# Patient Record
Sex: MALE | Race: WHITE | NOT HISPANIC OR LATINO | URBAN - METROPOLITAN AREA
[De-identification: names, ages, dates, MRNs, and addresses within clinical notes are randomized per-mention and may not be internally consistent; named-entity substitution may affect disease eponyms.]

---

## 2022-10-23 ENCOUNTER — EMERGENCY (EMERGENCY)
Facility: HOSPITAL | Age: 47
LOS: 0 days | Discharge: PSYCHIATRIC OR SUBSTANCE USE FACILITY | End: 2022-10-24
Attending: EMERGENCY MEDICINE

## 2022-10-23 VITALS
OXYGEN SATURATION: 100 % | DIASTOLIC BLOOD PRESSURE: 83 MMHG | SYSTOLIC BLOOD PRESSURE: 127 MMHG | HEIGHT: 74 IN | WEIGHT: 250 LBS | HEART RATE: 97 BPM | RESPIRATION RATE: 17 BRPM | TEMPERATURE: 98 F

## 2022-10-23 DIAGNOSIS — I48.91 UNSPECIFIED ATRIAL FIBRILLATION: ICD-10-CM

## 2022-10-23 DIAGNOSIS — Z20.822 CONTACT WITH AND (SUSPECTED) EXPOSURE TO COVID-19: ICD-10-CM

## 2022-10-23 DIAGNOSIS — F32.A DEPRESSION, UNSPECIFIED: ICD-10-CM

## 2022-10-23 DIAGNOSIS — R45.851 SUICIDAL IDEATIONS: ICD-10-CM

## 2022-10-23 LAB
BASOPHILS # BLD AUTO: 0.06 K/UL — SIGNIFICANT CHANGE UP (ref 0–0.2)
BASOPHILS NFR BLD AUTO: 0.7 % — SIGNIFICANT CHANGE UP (ref 0–2)
EOSINOPHIL # BLD AUTO: 0.16 K/UL — SIGNIFICANT CHANGE UP (ref 0–0.5)
EOSINOPHIL NFR BLD AUTO: 1.9 % — SIGNIFICANT CHANGE UP (ref 0–6)
HCT VFR BLD CALC: 47.2 % — SIGNIFICANT CHANGE UP (ref 39–50)
HGB BLD-MCNC: 15.9 G/DL — SIGNIFICANT CHANGE UP (ref 13–17)
IMM GRANULOCYTES NFR BLD AUTO: 0.2 % — SIGNIFICANT CHANGE UP (ref 0–0.9)
LYMPHOCYTES # BLD AUTO: 2.11 K/UL — SIGNIFICANT CHANGE UP (ref 1–3.3)
LYMPHOCYTES # BLD AUTO: 25.1 % — SIGNIFICANT CHANGE UP (ref 13–44)
MCHC RBC-ENTMCNC: 31 PG — SIGNIFICANT CHANGE UP (ref 27–34)
MCHC RBC-ENTMCNC: 33.7 G/DL — SIGNIFICANT CHANGE UP (ref 32–36)
MCV RBC AUTO: 92 FL — SIGNIFICANT CHANGE UP (ref 80–100)
MONOCYTES # BLD AUTO: 0.48 K/UL — SIGNIFICANT CHANGE UP (ref 0–0.9)
MONOCYTES NFR BLD AUTO: 5.7 % — SIGNIFICANT CHANGE UP (ref 2–14)
NEUTROPHILS # BLD AUTO: 5.58 K/UL — SIGNIFICANT CHANGE UP (ref 1.8–7.4)
NEUTROPHILS NFR BLD AUTO: 66.4 % — SIGNIFICANT CHANGE UP (ref 43–77)
NRBC # BLD: 0 /100 WBCS — SIGNIFICANT CHANGE UP (ref 0–0)
PCP SPEC-MCNC: SIGNIFICANT CHANGE UP
PLATELET # BLD AUTO: 201 K/UL — SIGNIFICANT CHANGE UP (ref 150–400)
RBC # BLD: 5.13 M/UL — SIGNIFICANT CHANGE UP (ref 4.2–5.8)
RBC # FLD: 12.3 % — SIGNIFICANT CHANGE UP (ref 10.3–14.5)
SALICYLATES SERPL-MCNC: 4.2 MG/DL — SIGNIFICANT CHANGE UP (ref 2.8–20)
WBC # BLD: 8.41 K/UL — SIGNIFICANT CHANGE UP (ref 3.8–10.5)
WBC # FLD AUTO: 8.41 K/UL — SIGNIFICANT CHANGE UP (ref 3.8–10.5)

## 2022-10-23 PROCEDURE — 99285 EMERGENCY DEPT VISIT HI MDM: CPT

## 2022-10-23 PROCEDURE — 93010 ELECTROCARDIOGRAM REPORT: CPT

## 2022-10-23 NOTE — ED PROVIDER NOTE - OBJECTIVE STATEMENT
Pertinent PMH/PSH/FHx/SHx and Review of Systems contained within:  Patient presents to the ED for suicidal ideation.  Patient brought by his loyda Jose Douglasimelda  for suicidal thoughts for the last week.  Patient says that he's been struggling with depression since age of 17.  He would plan to cut his wrists if he went through with it.  He feels his depression is related to his sobriety from cocaine and alcohol, now 50 days.  He denies any medical complaints.  He has been taking Prozac to manage his depression but feels it is not working.     Relevant PMHx/SHx/SOCHx/FAMH:  Bipolar disorder, Atrial fib  Patient denies EtOH/tobacco/illicit substance use in recent months    ROS: No fever/chills, No headache/photophobia/eye pain/changes in vision, No ear pain/sore throat/dysphagia, No chest pain/palpitations, no SOB/cough/wheeze/stridor, No abdominal pain, No N/V/D/melena, no dysuria/frequency/discharge, No neck/back pain, no rash, no changes in neurological status/function.

## 2022-10-23 NOTE — ED ADULT TRIAGE NOTE - CHIEF COMPLAINT QUOTE
suicidal thoughts , has a plan cut his wrist , multiples attempts last attempt was 2013, hx bipolar taking Prozac and has being sober 50 days today from cocaine and alcohol

## 2022-10-23 NOTE — ED ADULT NURSE NOTE - OBJECTIVE STATEMENT
pt is AOx3, ambulatory. pt came in complaining of suicidal thoughts. pt states hes been having suicidal thoughts since last week that have progressively gotten worse. pt states he isnt doing well mentally at this time. pt states he has a hx of bipolar and depression that he takes medication for. pt states he was previously admitted for the same thing. pt states the last time he had this episode was in 2013. pt states he did harm his L wrist today.    pt states he is 50 days sober from cocaine and alcohol. pt is AOx3, ambulatory. pt came in complaining of suicidal thoughts. pt states hes been having suicidal thoughts since last week that have progressively gotten worse. pt states he isnt doing well mentally at this time. pt states he has a hx of bipolar and depression that he takes medication for. pt states he was previously admitted for the same thing back in 2013. pt states the last time he had an episode was in 2013. pt states he did harm his L wrist today. pt is calm and cooperative at this time. pt has no other complaints at this time.     pt states he is 50 days sober from cocaine and alcohol. pt is AOx3, ambulatory. pt came in complaining of suicidal thoughts. pt states hes been having suicidal thoughts since last week that have progressively gotten worse. pt states he isnt doing well mentally at this time. pt states he has a hx of bipolar and depression that he takes medication for. pt states he was previously admitted for the same thing back in 2013. pt states the last time he had an episode was in 2013. pt states he did harm his L wrist today. pt denies any hallucinations. pt is calm and cooperative at this time. pt has no other complaints at this time.     pt states he is 50 days sober from cocaine and alcohol.  pt has pmh of AFIB

## 2022-10-23 NOTE — ED PROVIDER NOTE - CLINICAL SUMMARY MEDICAL DECISION MAKING FREE TEXT BOX
Patient stating depression and suicidal ideation.  VSS.  Lab values reviewed, there are no values which require acute intervention, patient medically cleared for admission.  Evaluated by psych, patient to be admitted on voluntary basis and consents to transfer.

## 2022-10-23 NOTE — ED PROVIDER NOTE - PHYSICAL EXAMINATION
Gen: Alert, NAD, sad appearing  Head: NC, AT, EOMI, normal lids/conjunctiva  ENT: normal hearing, patent oropharynx without erythema/exudate, uvula midline  Neck: +supple, no tenderness, +Trachea midline  Pulm: Bilateral BS, normal resp effort, no wheeze/stridor/retractions  CV: RRR, no M/R/G, +dist pulses  Abd: soft, NT/ND, Negative Hastings signs, +BS, no palpable masses  Mskel: no edema/erythema/cyanosis  Skin: no rash, warm/dry  Neuro: AAOx3, no apparent sensory/motor deficits, coordination intact

## 2022-10-24 ENCOUNTER — INPATIENT (INPATIENT)
Facility: HOSPITAL | Age: 47
LOS: 9 days | Discharge: HOME | DRG: 885 | End: 2022-11-03
Attending: PSYCHIATRY & NEUROLOGY | Admitting: PSYCHIATRY & NEUROLOGY
Payer: COMMERCIAL

## 2022-10-24 VITALS
TEMPERATURE: 98 F | DIASTOLIC BLOOD PRESSURE: 88 MMHG | RESPIRATION RATE: 16 BRPM | SYSTOLIC BLOOD PRESSURE: 134 MMHG | OXYGEN SATURATION: 97 % | HEART RATE: 69 BPM

## 2022-10-24 VITALS
OXYGEN SATURATION: 97 % | DIASTOLIC BLOOD PRESSURE: 94 MMHG | TEMPERATURE: 98 F | RESPIRATION RATE: 18 BRPM | SYSTOLIC BLOOD PRESSURE: 162 MMHG | HEART RATE: 61 BPM

## 2022-10-24 DIAGNOSIS — F32.A DEPRESSION, UNSPECIFIED: ICD-10-CM

## 2022-10-24 LAB
A1C WITH ESTIMATED AVERAGE GLUCOSE RESULT: 5.7 % — HIGH (ref 4–5.6)
ALBUMIN SERPL ELPH-MCNC: 4.2 G/DL — SIGNIFICANT CHANGE UP (ref 3.3–5)
ALP SERPL-CCNC: 83 U/L — SIGNIFICANT CHANGE UP (ref 40–120)
ALT FLD-CCNC: 35 U/L — SIGNIFICANT CHANGE UP (ref 12–78)
AMPHET UR-MCNC: NEGATIVE — SIGNIFICANT CHANGE UP
ANION GAP SERPL CALC-SCNC: 5 MMOL/L — SIGNIFICANT CHANGE UP (ref 5–17)
APAP SERPL-MCNC: < 2 UG/ML (ref 10–30)
AST SERPL-CCNC: 15 U/L — SIGNIFICANT CHANGE UP (ref 15–37)
BARBITURATES UR SCN-MCNC: NEGATIVE — SIGNIFICANT CHANGE UP
BENZODIAZ UR-MCNC: NEGATIVE — SIGNIFICANT CHANGE UP
BILIRUB SERPL-MCNC: 0.3 MG/DL — SIGNIFICANT CHANGE UP (ref 0.2–1.2)
BUN SERPL-MCNC: 12 MG/DL — SIGNIFICANT CHANGE UP (ref 7–23)
CALCIUM SERPL-MCNC: 9.6 MG/DL — SIGNIFICANT CHANGE UP (ref 8.5–10.1)
CHLORIDE SERPL-SCNC: 109 MMOL/L — HIGH (ref 96–108)
CHOLEST SERPL-MCNC: 152 MG/DL — SIGNIFICANT CHANGE UP
CO2 SERPL-SCNC: 24 MMOL/L — SIGNIFICANT CHANGE UP (ref 22–31)
COCAINE METAB.OTHER UR-MCNC: NEGATIVE — SIGNIFICANT CHANGE UP
CREAT SERPL-MCNC: 1.1 MG/DL — SIGNIFICANT CHANGE UP (ref 0.5–1.3)
EGFR: 85 ML/MIN/1.73M2 — SIGNIFICANT CHANGE UP
ESTIMATED AVERAGE GLUCOSE: 117 MG/DL — HIGH (ref 68–114)
ETHANOL SERPL-MCNC: <10 MG/DL — SIGNIFICANT CHANGE UP (ref 0–10)
FLUAV AG NPH QL: SIGNIFICANT CHANGE UP
FLUBV AG NPH QL: SIGNIFICANT CHANGE UP
GLUCOSE SERPL-MCNC: 90 MG/DL — SIGNIFICANT CHANGE UP (ref 70–99)
HDLC SERPL-MCNC: 35 MG/DL — LOW
LIPID PNL WITH DIRECT LDL SERPL: 93 MG/DL — SIGNIFICANT CHANGE UP
METHADONE UR-MCNC: NEGATIVE — SIGNIFICANT CHANGE UP
NON HDL CHOLESTEROL: 117 MG/DL — SIGNIFICANT CHANGE UP
OPIATES UR-MCNC: NEGATIVE — SIGNIFICANT CHANGE UP
PCP UR-MCNC: NEGATIVE — SIGNIFICANT CHANGE UP
POTASSIUM SERPL-MCNC: 4.1 MMOL/L — SIGNIFICANT CHANGE UP (ref 3.5–5.3)
POTASSIUM SERPL-SCNC: 4.1 MMOL/L — SIGNIFICANT CHANGE UP (ref 3.5–5.3)
PROT SERPL-MCNC: 7.7 GM/DL — SIGNIFICANT CHANGE UP (ref 6–8.3)
SARS-COV-2 RNA SPEC QL NAA+PROBE: SIGNIFICANT CHANGE UP
SODIUM SERPL-SCNC: 138 MMOL/L — SIGNIFICANT CHANGE UP (ref 135–145)
THC UR QL: NEGATIVE — SIGNIFICANT CHANGE UP
TRIGL SERPL-MCNC: 121 MG/DL — SIGNIFICANT CHANGE UP

## 2022-10-24 PROCEDURE — 90792 PSYCH DIAG EVAL W/MED SRVCS: CPT | Mod: 95

## 2022-10-24 RX ORDER — INFLUENZA VIRUS VACCINE 15; 15; 15; 15 UG/.5ML; UG/.5ML; UG/.5ML; UG/.5ML
0.5 SUSPENSION INTRAMUSCULAR ONCE
Refills: 0 | Status: DISCONTINUED | OUTPATIENT
Start: 2022-10-24 | End: 2022-11-03

## 2022-10-24 RX ORDER — DIPHENHYDRAMINE HCL 50 MG
50 CAPSULE ORAL EVERY 6 HOURS
Refills: 0 | Status: DISCONTINUED | OUTPATIENT
Start: 2022-10-24 | End: 2022-11-03

## 2022-10-24 RX ORDER — GABAPENTIN 400 MG/1
300 CAPSULE ORAL THREE TIMES A DAY
Refills: 0 | Status: DISCONTINUED | OUTPATIENT
Start: 2022-10-24 | End: 2022-11-03

## 2022-10-24 RX ORDER — HALOPERIDOL DECANOATE 100 MG/ML
5 INJECTION INTRAMUSCULAR EVERY 6 HOURS
Refills: 0 | Status: DISCONTINUED | OUTPATIENT
Start: 2022-10-24 | End: 2022-11-03

## 2022-10-24 RX ORDER — TRAZODONE HCL 50 MG
50 TABLET ORAL AT BEDTIME
Refills: 0 | Status: DISCONTINUED | OUTPATIENT
Start: 2022-10-24 | End: 2022-11-03

## 2022-10-24 RX ORDER — ACETAMINOPHEN 500 MG
650 TABLET ORAL EVERY 6 HOURS
Refills: 0 | Status: DISCONTINUED | OUTPATIENT
Start: 2022-10-24 | End: 2022-11-03

## 2022-10-24 RX ORDER — LITHIUM CARBONATE 300 MG/1
300 TABLET, EXTENDED RELEASE ORAL AT BEDTIME
Refills: 0 | Status: ACTIVE | OUTPATIENT
Start: 2022-10-24 | End: 2023-09-22

## 2022-10-24 RX ORDER — PANTOPRAZOLE SODIUM 20 MG/1
40 TABLET, DELAYED RELEASE ORAL
Refills: 0 | Status: DISCONTINUED | OUTPATIENT
Start: 2022-10-24 | End: 2022-11-03

## 2022-10-24 RX ADMIN — Medication 50 MILLIGRAM(S): at 22:01

## 2022-10-24 RX ADMIN — GABAPENTIN 300 MILLIGRAM(S): 400 CAPSULE ORAL at 22:01

## 2022-10-24 RX ADMIN — LITHIUM CARBONATE 300 MILLIGRAM(S): 300 TABLET, EXTENDED RELEASE ORAL at 22:01

## 2022-10-24 RX ADMIN — Medication 2 MILLIGRAM(S): at 18:19

## 2022-10-24 NOTE — ED BEHAVIORAL HEALTH ASSESSMENT NOTE - SUMMARY
47 yo M, domiciled in sober house,  from wife, has 3 children (ages 15, 14, 3), works as a , medical history of atrial fibrillation, psychiatric history of bipolar disorder, history of psychiatric hospitalizations, most recent in 2013 following suicide attempt by cutting his wrists, just started seeing a provider at [facility], substance use history of alcohol and cocaine, no legal history, no trauma history, who presents BIB self accompanied by a friend for SI with plan to slit his wrists. Psychiatry consulted for safety assessment.    The patient presents with depressed mood, hopelessness, and intrusive suicidal thoughts with plan and intent to slit his wrists since last week. He made an attempt to cut his wrists with a sharpened pen cap tonight and was interrupted by his friend and peer who walked into the room stopping the patient. Protective factors include patient being engaged in work, connected to outpatient treatment, and responsibility to family members. There is a history of a prior suicide attempt by cutting his wrists in 2013 and there has been a long period of relative mental health stability though extensive substance use prior to today's presentation, is newly connected to outpatient mental health treatment, and outpatient provider increased the Prozac which coincides with start of patient's episode of suicidal thoughts all of which acutely elevates patient's risk of suicide. Additional stressors are adjusting to sober living and being  from his family. The patient is unable to safety plan, unable to identify reasons to live or identify future plans. The patient meets involuntary criteria at this time, requiring psychiatric hospitalization for safety, stabilization, and medication management. The patient is willing to sign in and recommend reassessment if patient requests to leave.    PLAN:    - Hold for bed, 9.13  - Call psychiatry to reassess if patient requests discharge  - Discontinue Prozac

## 2022-10-24 NOTE — BH INPATIENT PSYCHIATRY ASSESSMENT NOTE - NSBHPHYSICALEXAM_PSY_ALL_CORE
PHYSICAL EXAM: Agree    VITALS: T(C): 36.6 (10-24-22 @ 11:57), Max: 36.7 (10-23-22 @ 22:00)  HR: 69 (10-24-22 @ 11:57) (63 - 97)  BP: 134/88 (10-24-22 @ 11:57) (127/76 - 139/90)  RR: 16 (10-24-22 @ 11:57) (14 - 18)  SpO2: 97% (10-24-22 @ 11:57) (96% - 100%)      GENERAL: NAD, comfortable, ambulating  HEAD:  Atraumatic, Normocephalic  EYES: EOMI, PERRLA, conjunctiva and sclera clear  ENT: Moist mucous membranes  NECK: Supple, No JVD  CHEST/LUNG: Clear to auscultation bilaterally; No rales, rhonchi, wheezing, or rubs. Unlabored respirations  HEART: Regular rate and rhythm; No murmurs, rubs, or gallops  ABDOMEN: BSx4; Soft, nontender, nondistended  EXTREMITIES:  No clubbing, cyanosis, or edema  NERVOUS SYSTEM:  A&Ox3, no focal deficits   SKIN: No rashes or lesions

## 2022-10-24 NOTE — BH INPATIENT PSYCHIATRY ASSESSMENT NOTE - NSBHMETABOLIC_PSY_ALL_CORE_FT
BMI: BMI (kg/m2): 32.1 (10-23-22 @ 22:00)  HbA1c:   Glucose:   BP: --  Lipid Panel:  BMI: BMI (kg/m2): 32.1 (10-23-22 @ 22:00)  HbA1c: A1C with Estimated Average Glucose Result: 5.7 % (10-24-22 @ 19:00)    Glucose:   BP: 144/92 (10-25-22 @ 08:25) (126/86 - 162/94)  Lipid Panel: Date/Time: 10-24-22 @ 19:00  Cholesterol, Serum: 152  Direct LDL: --  HDL Cholesterol, Serum: 35  Total Cholesterol/HDL Ration Measurement: --  Triglycerides, Serum: 121

## 2022-10-24 NOTE — BH INPATIENT PSYCHIATRY ASSESSMENT NOTE - NSICDXBHSECONDARYDX_PSY_ALL_CORE
Severe alcohol use disorder, in early remission   F10.21  Severe cocaine use disorder, in early remission   F14.21  MDD (major depressive disorder)   F32.9

## 2022-10-24 NOTE — BH INPATIENT PSYCHIATRY ASSESSMENT NOTE - NSBHCRANIAL_PSY_ALL_CORE
Recognizes 2 fingers or can read (II)/Opens mouth, sticks out tongue (V, XII)/Normal speech (IX, X, XII)/Shoulder shrug (XI)

## 2022-10-24 NOTE — BH INPATIENT PSYCHIATRY ASSESSMENT NOTE - NSBHASSESSSUMMFT_PSY_ALL_CORE
This is a 47 yo  male, domiciled in sober house x50 days,  with 3 children (ages 15, 14, 3, 4y/o lives at home with current wife, older two live with his ex wife), works as a  at [employer] in [employer] de[employer]. Medical history significant for atrial fibrillation, HTN and GERD. Psychiatric history of bipolar disorder, history of psychiatric hospitalizations, most recent in 2013 following suicide attempt by cutting his wrists. Hx of numerous detox/ rehabs (alcohol/cocaine), currently in sober house x50 days. In treatment recently with Psychiatrist and therapist at [facility] being treated with prozac. No legal history, no trauma history. Patient presents to ED accompanied by a friend with c/o of SI with plan to slit his wrists. Transferred to [facility] voluntary status.

## 2022-10-24 NOTE — BH INPATIENT PSYCHIATRY ASSESSMENT NOTE - CURRENT MEDICATION
MEDICATIONS  (STANDING):  gabapentin 300 milliGRAM(s) Oral three times a day  influenza   Vaccine 0.5 milliLiter(s) IntraMuscular once  lithium 300 milliGRAM(s) Oral at bedtime  LORazepam     Tablet 2 milliGRAM(s) Oral every 6 hours  traZODone 50 milliGRAM(s) Oral at bedtime    MEDICATIONS  (PRN):  acetaminophen     Tablet .. 650 milliGRAM(s) Oral every 6 hours PRN Moderate Pain (4 - 6)  aluminum hydroxide/magnesium hydroxide/simethicone Suspension 30 milliLiter(s) Oral every 6 hours PRN Dyspepsia  diphenhydrAMINE 50 milliGRAM(s) Oral every 6 hours PRN agitation  haloperidol     Tablet 5 milliGRAM(s) Oral every 6 hours PRN agitation   MEDICATIONS  (STANDING):  gabapentin 300 milliGRAM(s) Oral three times a day  influenza   Vaccine 0.5 milliLiter(s) IntraMuscular once  lithium 300 milliGRAM(s) Oral at bedtime  losartan 50 milliGRAM(s) Oral daily  pantoprazole    Tablet 40 milliGRAM(s) Oral before breakfast  traZODone 50 milliGRAM(s) Oral at bedtime    MEDICATIONS  (PRN):  acetaminophen     Tablet .. 650 milliGRAM(s) Oral every 6 hours PRN Moderate Pain (4 - 6)  aluminum hydroxide/magnesium hydroxide/simethicone Suspension 30 milliLiter(s) Oral every 6 hours PRN Dyspepsia  diphenhydrAMINE 50 milliGRAM(s) Oral every 6 hours PRN agitation  haloperidol     Tablet 5 milliGRAM(s) Oral every 6 hours PRN agitation  LORazepam     Tablet 2 milliGRAM(s) Oral every 6 hours PRN agitation

## 2022-10-24 NOTE — BH PATIENT PROFILE - HOME MEDICATIONS
FLUoxetine 60 mg oral tablet , 1 tab(s) orally once a day (in the morning)  omeprazole 40 mg oral delayed release capsule , 1 cap(s) orally once a day  amLODIPine 10 mg oral tablet , 1 tab(s) orally once a day

## 2022-10-24 NOTE — BH INPATIENT PSYCHIATRY ASSESSMENT NOTE - NSBHCHARTREVIEWVS_PSY_A_CORE FT
Vital Signs Last 24 Hrs  T(C): 36.6 (10-24-22 @ 11:57), Max: 36.7 (10-23-22 @ 22:00)  T(F): 97.8 (10-24-22 @ 11:57), Max: 98 (10-23-22 @ 22:00)  HR: 69 (10-24-22 @ 11:57) (63 - 97)  BP: 134/88 (10-24-22 @ 11:57) (127/76 - 139/90)  BP(mean): --  RR: 16 (10-24-22 @ 11:57) (14 - 18)  SpO2: 97% (10-24-22 @ 11:57) (96% - 100%)     Vital Signs Last 24 Hrs  T(C): 36.4 (10-25-22 @ 08:25), Max: 36.4 (10-25-22 @ 08:25)  T(F): 97.5 (10-25-22 @ 08:25), Max: 97.5 (10-25-22 @ 08:25)  HR: 67 (10-25-22 @ 08:25) (67 - 69)  BP: 144/92 (10-25-22 @ 08:25) (126/86 - 150/90)  BP(mean): --  RR: 18 (10-25-22 @ 08:25) (18 - 18)  SpO2: 97% (10-25-22 @ 08:25) (97% - 97%)

## 2022-10-24 NOTE — BH PATIENT PROFILE - FALL HARM RISK - UNIVERSAL INTERVENTIONS
Bed in lowest position, wheels locked, appropriate side rails in place/Call bell, personal items and telephone in reach/Instruct patient to call for assistance before getting out of bed or chair/Non-slip footwear when patient is out of bed/Merrill to call system/Physically safe environment - no spills, clutter or unnecessary equipment/Purposeful Proactive Rounding/Room/bathroom lighting operational, light cord in reach

## 2022-10-24 NOTE — BH INPATIENT PSYCHIATRY ASSESSMENT NOTE - NSBHATTESTAPPBILLTIME_PSY_A_CORE
I attest my time as TARIQ is greater than 50% of the total combined time spent on qualifying patient care activities. I have reviewed and verified the documentation.

## 2022-10-24 NOTE — ED BEHAVIORAL HEALTH ASSESSMENT NOTE - HPI (INCLUDE ILLNESS QUALITY, SEVERITY, DURATION, TIMING, CONTEXT, MODIFYING FACTORS, ASSOCIATED SIGNS AND SYMPTOMS)
47 yo M, domiciled in sober house,  from wife, has 3 children (ages 15, 14, 3), works as a , medical history of atrial fibrillation, psychiatric history of bipolar disorder, history of psychiatric hospitalizations, most recent in 2013 following suicide attempt by cutting his wrists, just started seeing a provider at [facility], substance use history of alcohol and cocaine, no legal history, no trauma history, who presents BIB self accompanied by a friend for SI with plan to slit his wrists. Psychiatry consulted for safety assessment.    Patient reports that he was cutting his wrist with a sharpened pen cap when his friend walked in the room and he stopped. He says he has been having suicidal thoughts since last week and he lied to his psychiatrist when they met last week. He says his psychiatrist increased the Prozac from 40 mg to 60 mg and it was the second visit with this provider. He reports using alcohol, cocaine, self-interrupted suicide attempts, and then cutting his wrists in 2013 leading to his last psychiatric hospitalization. He reports being stable from 2013 to 2016 and doesn't remember what medication he was on at that time, just that he was diagnosed with bipolar disorder. He reports in 2016 he stopped taking his psychiatric medication and relapsed using alcohol and cocaine until 50 days ago when he entered sober living and started getting mental health treatment again. He says he is struggling with the transition of being sober,  from his wife again, and says he doesn't know what his thoughts are regarding suicide. He denies homicidal thoughts, denies access to a gun, denies history of violence or legal issues. He says he is willing to be hospitalized.

## 2022-10-24 NOTE — BH SOCIAL WORK INITIAL PSYCHOSOCIAL EVALUATION - NSBHCHILDRESP_PSY_ALL_CORE
with 3 children (ages 15, 14, 3) 2y/o lives at home with current wife, older two live with his ex wife)/No

## 2022-10-24 NOTE — BH INPATIENT PSYCHIATRY ASSESSMENT NOTE - HPI (INCLUDE ILLNESS QUALITY, SEVERITY, DURATION, TIMING, CONTEXT, MODIFYING FACTORS, ASSOCIATED SIGNS AND SYMPTOMS)
This is a 47 yo  male, domiciled in sober house x50 days,  with 3 children (ages 15, 14, 3, 4y/o lives at home with current wife, older two live with his ex wife), works as a  at [employer] in [employer] dept. Medical history significant for atrial fibrillation, HTN and GERD. Psychiatric history of bipolar disorder, history of psychiatric hospitalizations, most recent in 2013 following suicide attempt by cutting his wrists. Hx of numerous detox/ rehabs (alcohol/cocaine), currently in sober house x50 days. In treatment recently with Psychiatrist and therapist at [facility] being treated with prozac. No legal history, no trauma history. Patient presents to [facility] accompanied by a friend with c/o of SI with plan to slit his wrists. Transferred to [facility] voluntary status.     Patient states that he has been free of substances and alcohol x51 days, following detox at [facility] and transition to sober house. Substances of choice are etoh and cocaine. He has been using substances since 8/9th grade. He reports having diagnoses of Bipolar but stopped taking his medications years ago. He was recently started on prozac x1 month with minimal effect on mood. SI has been worsening x2 weeks with him recently attempting to cut his wrist with a sharpened pen cap. A friend was very concerned about him and pt disclosed how he was feeling. Friend encouraged him to go to hospital for help. Poor sleep and appetite since going to the sober house, losing an estimated 15lbs and averaging 3 hours each night. No a/vh but has had in the past while intoxicated. No paranoia. No hi.       per ED report:   Patient reports that he was cutting his wrist with a sharpened pen cap when his friend walked in the room and he stopped. He says he has been having suicidal thoughts since last week and he lied to his psychiatrist when they met last week. He says his psychiatrist increased the Prozac from 40 mg to 60 mg and it was the second visit with this provider. He reports using alcohol, cocaine, self-interrupted suicide attempts, and then cutting his wrists in 2013 leading to his last psychiatric hospitalization. He reports being stable from 2013 to 2016 and doesn't remember what medication he was on at that time, just that he was diagnosed with bipolar disorder. He reports in 2016 he stopped taking his psychiatric medication and relapsed using alcohol and cocaine until 50 days ago when he entered sober living and started getting mental health treatment again. He says he is struggling with the transition of being sober,  from his wife again, and says he doesn't know what his thoughts are regarding suicide. He denies homicidal thoughts, denies access to a gun, denies history of violence or legal issues. He says he is willing to be hospitalized.

## 2022-10-24 NOTE — BH SOCIAL WORK INITIAL PSYCHOSOCIAL EVALUATION - NSHIGHRISKBEHFT_PSY_ALL_CORE
SI has been worsening x2 weeks with him recently attempting to cut his wrist with a sharpened pen cap.

## 2022-10-24 NOTE — ED BEHAVIORAL HEALTH NOTE - BEHAVIORAL HEALTH NOTE
==================  PRE-HOSPITAL COURSE  ===================  SOURCE:  RN and secondhand ED documentation  DETAILS: Patient is a 46y Male complaining of suicidal thoughts.  =========  ED COURSE  =========  SOURCE:  KERRI Feliciano and secondhand ED documentation.  ARRIVAL:  Per chart and RN, patient arrived via walk in. Per RN, patient was calm upon arrival, and cooperative with triage process.  BELONGINGS:  Per RN, patient arrived with belongings. All belongings were provided to hospital security, and patient currently in a gown with a 1:1 staff member.  BEHAVIOR: RN described patient to be calm and cooperative, presenting with linear thought process, AAOx3, presenting with normal mood and appropriate affect, remains in behavioral and impulse control, is not violent/aggressive. RN stated that the patient is endorsing SI, denying HI/A/VH. RN stated that there is a cut on the L wrist, no other visible marks, bruises, or lacerations on the body. RN stated that the patient appears to be well-groomed, maintains fair hygiene, and reports fair ADLs, ambulates without assistance.   TREATMENT:  Per chart and RN, patient did not receive PRN medications.   VISITORS:  Per RN, no visitors at bedside.    COVID Exposure Screen- collateral (i.e. third-party, chart review, belongings, etc; include EMS and ED staff)   ---------------------------------------------------  1. Has the patient had a COVID-19 test in the last 90 days? Unknown.   2. Has the patient tested positive for COVID-19 antibodies? Unknown.   3. Has the patient received 2 doses of the COVID-19 vaccine? Unknown  4. In the past 10 days, has the patient been around anyone with a positive COVID-19 test?* Unknown.   5. Has the patient been out of [place] within the past 10 days? Unknown

## 2022-10-25 DIAGNOSIS — F14.21 COCAINE DEPENDENCE, IN REMISSION: ICD-10-CM

## 2022-10-25 DIAGNOSIS — F32.9 MAJOR DEPRESSIVE DISORDER, SINGLE EPISODE, UNSPECIFIED: ICD-10-CM

## 2022-10-25 DIAGNOSIS — F10.21 ALCOHOL DEPENDENCE, IN REMISSION: ICD-10-CM

## 2022-10-25 PROCEDURE — 99223 1ST HOSP IP/OBS HIGH 75: CPT

## 2022-10-25 RX ORDER — LOSARTAN POTASSIUM 100 MG/1
50 TABLET, FILM COATED ORAL DAILY
Refills: 0 | Status: DISCONTINUED | OUTPATIENT
Start: 2022-10-25 | End: 2022-11-03

## 2022-10-25 RX ADMIN — Medication 2 MILLIGRAM(S): at 16:53

## 2022-10-25 RX ADMIN — GABAPENTIN 300 MILLIGRAM(S): 400 CAPSULE ORAL at 12:55

## 2022-10-25 RX ADMIN — HALOPERIDOL DECANOATE 5 MILLIGRAM(S): 100 INJECTION INTRAMUSCULAR at 16:53

## 2022-10-25 RX ADMIN — LOSARTAN POTASSIUM 50 MILLIGRAM(S): 100 TABLET, FILM COATED ORAL at 12:55

## 2022-10-25 RX ADMIN — GABAPENTIN 300 MILLIGRAM(S): 400 CAPSULE ORAL at 09:33

## 2022-10-25 RX ADMIN — PANTOPRAZOLE SODIUM 40 MILLIGRAM(S): 20 TABLET, DELAYED RELEASE ORAL at 06:48

## 2022-10-25 NOTE — DIETITIAN INITIAL EVALUATION ADULT - PERTINENT LABORATORY DATA
10-23    138  |  109<H>  |  12  ----------------------------<  90  4.1   |  24  |  1.10    Ca    9.6      23 Oct 2022 22:16    TPro  7.7  /  Alb  4.2  /  TBili  0.3  /  DBili  x   /  AST  15  /  ALT  35  /  AlkPhos  83  10-23  A1C with Estimated Average Glucose Result: 5.7 % (10-24-22 @ 19:00)

## 2022-10-25 NOTE — BH INPATIENT PSYCHIATRY PROGRESS NOTE - NSBHCHARTREVIEWVS_PSY_A_CORE FT
Vital Signs Last 24 Hrs  T(C): 36.4 (10-25-22 @ 08:25), Max: 36.4 (10-25-22 @ 08:25)  T(F): 97.5 (10-25-22 @ 08:25), Max: 97.5 (10-25-22 @ 08:25)  HR: 67 (10-25-22 @ 08:25) (67 - 69)  BP: 144/92 (10-25-22 @ 08:25) (126/86 - 150/90)  BP(mean): --  RR: 18 (10-25-22 @ 08:25) (18 - 18)  SpO2: 97% (10-25-22 @ 08:25) (97% - 97%)     Vital Signs Last 24 Hrs  T(C): --  T(F): --  HR: 68 (10-25-22 @ 17:00) (68 - 68)  BP: 143/88 (10-25-22 @ 17:00) (143/88 - 143/88)  BP(mean): --  RR: 18 (10-25-22 @ 17:00) (18 - 18)  SpO2: 98% (10-25-22 @ 17:00) (98% - 98%)

## 2022-10-25 NOTE — DIETITIAN INITIAL EVALUATION ADULT - PERTINENT MEDS FT
MEDICATIONS  (STANDING):  gabapentin 300 milliGRAM(s) Oral three times a day  influenza   Vaccine 0.5 milliLiter(s) IntraMuscular once  lithium 300 milliGRAM(s) Oral at bedtime  losartan 50 milliGRAM(s) Oral daily  pantoprazole    Tablet 40 milliGRAM(s) Oral before breakfast  traZODone 50 milliGRAM(s) Oral at bedtime    MEDICATIONS  (PRN):  acetaminophen     Tablet .. 650 milliGRAM(s) Oral every 6 hours PRN Moderate Pain (4 - 6)  aluminum hydroxide/magnesium hydroxide/simethicone Suspension 30 milliLiter(s) Oral every 6 hours PRN Dyspepsia  diphenhydrAMINE 50 milliGRAM(s) Oral every 6 hours PRN agitation  haloperidol     Tablet 5 milliGRAM(s) Oral every 6 hours PRN agitation  LORazepam     Tablet 2 milliGRAM(s) Oral every 6 hours PRN agitation

## 2022-10-25 NOTE — BH INPATIENT PSYCHIATRY PROGRESS NOTE - NSBHMETABOLIC_PSY_ALL_CORE_FT
BMI: BMI (kg/m2): 32.1 (10-23-22 @ 22:00)  HbA1c: A1C with Estimated Average Glucose Result: 5.7 % (10-24-22 @ 19:00)    Glucose:   BP: 144/92 (10-25-22 @ 08:25) (126/86 - 162/94)  Lipid Panel: Date/Time: 10-24-22 @ 19:00  Cholesterol, Serum: 152  Direct LDL: --  HDL Cholesterol, Serum: 35  Total Cholesterol/HDL Ration Measurement: --  Triglycerides, Serum: 121   BMI: BMI (kg/m2): 32.1 (10-23-22 @ 22:00)  HbA1c: A1C with Estimated Average Glucose Result: 5.6 % (10-26-22 @ 05:30)    Glucose:   BP: 143/88 (10-25-22 @ 17:00) (126/86 - 162/94)  Lipid Panel: Date/Time: 10-26-22 @ 05:30  Cholesterol, Serum: 144  Direct LDL: --  HDL Cholesterol, Serum: 33  Total Cholesterol/HDL Ration Measurement: --  Triglycerides, Serum: 113

## 2022-10-25 NOTE — DIETITIAN INITIAL EVALUATION ADULT - OTHER INFO
This is a 45 yo  male, domiciled in sober house x50 days,  with 3 children (ages 15, 14, 3, 4y/o lives at home with current wife, older two live with his ex wife), works as a  at [employer] in [employer] de[employer]. Medical history significant for atrial fibrillation, HTN and GERD. Psychiatric history of bipolar disorder, history of psychiatric hospitalizations, most recent in 2013 following suicide attempt by cutting his wrists. Hx of numerous detox/ rehabs (alcohol/cocaine), currently in sober house x50 days. In treatment recently with Psychiatrist and therapist at [facility] being treated with prozac. No legal history, no trauma history. Patient presents to ED accompanied by a friend with c/o of SI with plan to slit his wrists. Transferred to [facility] voluntary status.     Pt care discussed in IDT rounds. Rx and labs reviewed. Pt presents with no overt signs of nutritional wasting. No ht or wt value in ERM; spoke with pt who reports a UBW of 253 lbs. Estimated ht of 6'0"; IBW of 80.9kg. Pt reports a wt loss of 15 lbs x ~2 months; reports his wt at admission was 238 lbs which supports this wt change value. Pt states he has been living in a sober home x50 days. States he cannot cook here and his food is often stolen. Pt states he will order take out foods often for 1-2 meals per day and not eat otherwise; choices include mcdonalds, taco bell, and pizza. Discussed use of ONS regimen while inpatient and at home; pt agreeable and verbalized understanding. No c/o NVCD or difficult chew/swallow. NKA to food. RDN will continue to reassess, intervene, and monitor as appropriate.     No pain, GI, or skin assessment documented in flowsheets as of yet.

## 2022-10-25 NOTE — DIETITIAN INITIAL EVALUATION ADULT - ADD RECOMMEND
-Continue current diet   -Add Ensure TID (Provider contacted, no response yet)  -Reinforce diet education as able at f/u   -Encourage good PO intake and ONS compliance   -Monitor chemistry, GI fxn, and skin integrity

## 2022-10-25 NOTE — DIETITIAN INITIAL EVALUATION ADULT - OTHER CALCULATIONS
IBW used to calculate needs due to pt's current body weight exceeding 120% of IBW adjusted for age. IBW calculated based on estimated ht value.

## 2022-10-26 LAB
A1C WITH ESTIMATED AVERAGE GLUCOSE RESULT: 5.6 % — SIGNIFICANT CHANGE UP (ref 4–5.6)
CHOLEST SERPL-MCNC: 144 MG/DL — SIGNIFICANT CHANGE UP
ESTIMATED AVERAGE GLUCOSE: 114 MG/DL — SIGNIFICANT CHANGE UP (ref 68–114)
HDLC SERPL-MCNC: 33 MG/DL — LOW
LIPID PNL WITH DIRECT LDL SERPL: 88 MG/DL — SIGNIFICANT CHANGE UP
NON HDL CHOLESTEROL: 111 MG/DL — SIGNIFICANT CHANGE UP
TRIGL SERPL-MCNC: 113 MG/DL — SIGNIFICANT CHANGE UP

## 2022-10-26 PROCEDURE — 99233 SBSQ HOSP IP/OBS HIGH 50: CPT

## 2022-10-26 PROCEDURE — 70450 CT HEAD/BRAIN W/O DYE: CPT | Mod: 26

## 2022-10-26 RX ADMIN — GABAPENTIN 300 MILLIGRAM(S): 400 CAPSULE ORAL at 21:59

## 2022-10-26 RX ADMIN — PANTOPRAZOLE SODIUM 40 MILLIGRAM(S): 20 TABLET, DELAYED RELEASE ORAL at 07:12

## 2022-10-26 RX ADMIN — LITHIUM CARBONATE 300 MILLIGRAM(S): 300 TABLET, EXTENDED RELEASE ORAL at 21:59

## 2022-10-26 RX ADMIN — GABAPENTIN 300 MILLIGRAM(S): 400 CAPSULE ORAL at 15:00

## 2022-10-26 RX ADMIN — HALOPERIDOL DECANOATE 5 MILLIGRAM(S): 100 INJECTION INTRAMUSCULAR at 09:59

## 2022-10-26 RX ADMIN — LOSARTAN POTASSIUM 50 MILLIGRAM(S): 100 TABLET, FILM COATED ORAL at 10:00

## 2022-10-26 RX ADMIN — Medication 50 MILLIGRAM(S): at 21:59

## 2022-10-26 RX ADMIN — GABAPENTIN 300 MILLIGRAM(S): 400 CAPSULE ORAL at 10:00

## 2022-10-26 NOTE — BH INPATIENT PSYCHIATRY PROGRESS NOTE - NSBHCHARTREVIEWVS_PSY_A_CORE FT
Vital Signs Last 24 Hrs  T(C): 36.7 (10-26-22 @ 17:00), Max: 36.7 (10-26-22 @ 17:00)  T(F): 98 (10-26-22 @ 17:00), Max: 98 (10-26-22 @ 17:00)  HR: 67 (10-26-22 @ 17:00) (62 - 67)  BP: 145/73 (10-26-22 @ 17:00) (145/73 - 150/79)  BP(mean): --  RR: 18 (10-26-22 @ 17:00) (18 - 18)  SpO2: 96% (10-26-22 @ 17:00) (95% - 96%)

## 2022-10-26 NOTE — BH INPATIENT PSYCHIATRY PROGRESS NOTE - NSBHMETABOLIC_PSY_ALL_CORE_FT
BMI: BMI (kg/m2): 32.1 (10-23-22 @ 22:00)  HbA1c: A1C with Estimated Average Glucose Result: 5.6 % (10-26-22 @ 05:30)    Glucose:   BP: 145/73 (10-26-22 @ 17:00) (126/86 - 162/94)  Lipid Panel: Date/Time: 10-26-22 @ 05:30  Cholesterol, Serum: 144  Direct LDL: --  HDL Cholesterol, Serum: 33  Total Cholesterol/HDL Ration Measurement: --  Triglycerides, Serum: 113

## 2022-10-27 PROCEDURE — 99233 SBSQ HOSP IP/OBS HIGH 50: CPT

## 2022-10-27 RX ORDER — HALOPERIDOL DECANOATE 100 MG/ML
5 INJECTION INTRAMUSCULAR AT BEDTIME
Refills: 0 | Status: DISCONTINUED | OUTPATIENT
Start: 2022-10-27 | End: 2022-10-31

## 2022-10-27 RX ORDER — PREGABALIN 225 MG/1
1000 CAPSULE ORAL DAILY
Refills: 0 | Status: DISCONTINUED | OUTPATIENT
Start: 2022-10-27 | End: 2022-11-03

## 2022-10-27 RX ORDER — LITHIUM CARBONATE 300 MG/1
450 TABLET, EXTENDED RELEASE ORAL AT BEDTIME
Refills: 0 | Status: DISCONTINUED | OUTPATIENT
Start: 2022-10-27 | End: 2022-10-28

## 2022-10-27 RX ORDER — FOLIC ACID 0.8 MG
1 TABLET ORAL DAILY
Refills: 0 | Status: DISCONTINUED | OUTPATIENT
Start: 2022-10-27 | End: 2022-11-03

## 2022-10-27 RX ADMIN — Medication 1 MILLIGRAM(S): at 13:15

## 2022-10-27 RX ADMIN — LOSARTAN POTASSIUM 50 MILLIGRAM(S): 100 TABLET, FILM COATED ORAL at 09:30

## 2022-10-27 RX ADMIN — Medication 50 MILLIGRAM(S): at 20:47

## 2022-10-27 RX ADMIN — Medication 1 TABLET(S): at 13:15

## 2022-10-27 RX ADMIN — HALOPERIDOL DECANOATE 5 MILLIGRAM(S): 100 INJECTION INTRAMUSCULAR at 20:47

## 2022-10-27 RX ADMIN — PREGABALIN 1000 MICROGRAM(S): 225 CAPSULE ORAL at 14:24

## 2022-10-27 RX ADMIN — PANTOPRAZOLE SODIUM 40 MILLIGRAM(S): 20 TABLET, DELAYED RELEASE ORAL at 07:04

## 2022-10-27 RX ADMIN — GABAPENTIN 300 MILLIGRAM(S): 400 CAPSULE ORAL at 09:30

## 2022-10-27 RX ADMIN — GABAPENTIN 300 MILLIGRAM(S): 400 CAPSULE ORAL at 20:47

## 2022-10-27 RX ADMIN — GABAPENTIN 300 MILLIGRAM(S): 400 CAPSULE ORAL at 13:15

## 2022-10-27 RX ADMIN — LITHIUM CARBONATE 450 MILLIGRAM(S): 300 TABLET, EXTENDED RELEASE ORAL at 20:47

## 2022-10-27 NOTE — BH TREATMENT PLAN - NSTXANXINTERMD_PSY_ALL_CORE
Psychopharmacology x15 minutes, will continue to titrate medications as appropriate Psychopharmacology x15 minutes, will continue to titrate medications (Lithium and Haldol) as appropriate

## 2022-10-27 NOTE — BH TREATMENT PLAN - NSTXDEPRESINTERMD_PSY_ALL_CORE
Psychopharmacology x15 minutes, will continue to titrate medications as appropriate Psychopharmacology x15 minutes, will continue to titrate (Lithium and Haldol) medications as appropriate

## 2022-10-27 NOTE — BH INPATIENT PSYCHIATRY PROGRESS NOTE - NSBHCHARTREVIEWVS_PSY_A_CORE FT
Vital Signs Last 24 Hrs  T(C): 36.6 (10-27-22 @ 08:30), Max: 36.7 (10-26-22 @ 17:00)  T(F): 97.9 (10-27-22 @ 08:30), Max: 98 (10-26-22 @ 17:00)  HR: 62 (10-27-22 @ 08:30) (62 - 67)  BP: 148/83 (10-27-22 @ 08:30) (145/73 - 148/83)  BP(mean): --  RR: 18 (10-27-22 @ 08:30) (18 - 18)  SpO2: 98% (10-27-22 @ 08:30) (96% - 98%)

## 2022-10-27 NOTE — BH INPATIENT PSYCHIATRY PROGRESS NOTE - NSBHMETABOLIC_PSY_ALL_CORE_FT
BMI: BMI (kg/m2): 32.1 (10-23-22 @ 22:00)  HbA1c: A1C with Estimated Average Glucose Result: 5.6 % (10-26-22 @ 05:30)    Glucose:   BP: 148/83 (10-27-22 @ 08:30) (126/86 - 162/94)  Lipid Panel: Date/Time: 10-26-22 @ 05:30  Cholesterol, Serum: 144  Direct LDL: --  HDL Cholesterol, Serum: 33  Total Cholesterol/HDL Ration Measurement: --  Triglycerides, Serum: 113

## 2022-10-27 NOTE — BH INPATIENT PSYCHIATRY PROGRESS NOTE - CURRENT MEDICATION
MEDICATIONS  (STANDING):  cyanocobalamin 1000 MICROGram(s) Oral daily  folic acid 1 milliGRAM(s) Oral daily  gabapentin 300 milliGRAM(s) Oral three times a day  haloperidol     Tablet 5 milliGRAM(s) Oral at bedtime  influenza   Vaccine 0.5 milliLiter(s) IntraMuscular once  lithium 450 milliGRAM(s) Oral at bedtime  losartan 50 milliGRAM(s) Oral daily  multivitamin 1 Tablet(s) Oral daily  pantoprazole    Tablet 40 milliGRAM(s) Oral before breakfast  traZODone 50 milliGRAM(s) Oral at bedtime    MEDICATIONS  (PRN):  acetaminophen     Tablet .. 650 milliGRAM(s) Oral every 6 hours PRN Moderate Pain (4 - 6)  aluminum hydroxide/magnesium hydroxide/simethicone Suspension 30 milliLiter(s) Oral every 6 hours PRN Dyspepsia  diphenhydrAMINE 50 milliGRAM(s) Oral every 6 hours PRN agitation  haloperidol     Tablet 5 milliGRAM(s) Oral every 6 hours PRN agitation  LORazepam     Tablet 2 milliGRAM(s) Oral every 6 hours PRN agitation

## 2022-10-28 LAB — GLUCOSE BLDC GLUCOMTR-MCNC: 118 MG/DL — HIGH (ref 70–99)

## 2022-10-28 PROCEDURE — 99221 1ST HOSP IP/OBS SF/LOW 40: CPT

## 2022-10-28 PROCEDURE — 99233 SBSQ HOSP IP/OBS HIGH 50: CPT

## 2022-10-28 RX ORDER — NICOTINE POLACRILEX 2 MG
2 GUM BUCCAL
Refills: 0 | Status: DISCONTINUED | OUTPATIENT
Start: 2022-10-28 | End: 2022-11-03

## 2022-10-28 RX ORDER — NICOTINE POLACRILEX 2 MG
1 GUM BUCCAL DAILY
Refills: 0 | Status: DISCONTINUED | OUTPATIENT
Start: 2022-10-28 | End: 2022-11-03

## 2022-10-28 RX ORDER — LITHIUM CARBONATE 300 MG/1
600 TABLET, EXTENDED RELEASE ORAL AT BEDTIME
Refills: 0 | Status: DISCONTINUED | OUTPATIENT
Start: 2022-10-28 | End: 2022-10-30

## 2022-10-28 RX ORDER — IBUPROFEN 200 MG
600 TABLET ORAL ONCE
Refills: 0 | Status: COMPLETED | OUTPATIENT
Start: 2022-10-28 | End: 2022-10-28

## 2022-10-28 RX ADMIN — Medication 50 MILLIGRAM(S): at 21:44

## 2022-10-28 RX ADMIN — Medication 650 MILLIGRAM(S): at 22:31

## 2022-10-28 RX ADMIN — GABAPENTIN 300 MILLIGRAM(S): 400 CAPSULE ORAL at 21:44

## 2022-10-28 RX ADMIN — GABAPENTIN 300 MILLIGRAM(S): 400 CAPSULE ORAL at 09:58

## 2022-10-28 RX ADMIN — LOSARTAN POTASSIUM 50 MILLIGRAM(S): 100 TABLET, FILM COATED ORAL at 09:59

## 2022-10-28 RX ADMIN — Medication 600 MILLIGRAM(S): at 16:27

## 2022-10-28 RX ADMIN — LITHIUM CARBONATE 600 MILLIGRAM(S): 300 TABLET, EXTENDED RELEASE ORAL at 21:44

## 2022-10-28 RX ADMIN — Medication 650 MILLIGRAM(S): at 21:44

## 2022-10-28 RX ADMIN — HALOPERIDOL DECANOATE 5 MILLIGRAM(S): 100 INJECTION INTRAMUSCULAR at 21:44

## 2022-10-28 RX ADMIN — GABAPENTIN 300 MILLIGRAM(S): 400 CAPSULE ORAL at 12:41

## 2022-10-28 RX ADMIN — Medication 2 MILLIGRAM(S): at 12:42

## 2022-10-28 RX ADMIN — PREGABALIN 1000 MICROGRAM(S): 225 CAPSULE ORAL at 10:14

## 2022-10-28 RX ADMIN — PANTOPRAZOLE SODIUM 40 MILLIGRAM(S): 20 TABLET, DELAYED RELEASE ORAL at 07:24

## 2022-10-28 RX ADMIN — Medication 1 TABLET(S): at 09:58

## 2022-10-28 RX ADMIN — HALOPERIDOL DECANOATE 5 MILLIGRAM(S): 100 INJECTION INTRAMUSCULAR at 09:59

## 2022-10-28 RX ADMIN — Medication 1 MILLIGRAM(S): at 09:58

## 2022-10-28 NOTE — CONSULT NOTE ADULT - ASSESSMENT
Progress Notes by Vane Martin CNP at 3/25/2021  8:30 AM     Author: Vane Martin CNP Service: -- Author Type: Nurse Practitioner    Filed: 3/25/2021  9:44 AM Encounter Date: 3/25/2021 Status: Signed    : Vane Martin CNP (Nurse Practitioner)             Assessment/Recommendations   Assessment:    1. Nonischemic cardiomyopathy with systolic dysfunction, LVEF 17%, NYHA class II: Compensated.  He states his symptoms have improved significantly since hospitalization.  He has mild fatigue and dyspnea on exertion.  He denies orthopnea, PND, edema.  He is following a low-sodium diet and monitoring his weight.  He is not wearing the LifeVest due to causing him severe anxiety.    2.  Hypertension: Controlled.  Blood pressure 108/66    3.  Anxiety: He states his anxiety level is severe.  He is following up with his PMD today and I discussed the importance of following up with him regarding this.    4.  CVA: He had a right middle cerebral artery infarct during hospitalization.  He was started on warfarin.  He will have his INR checked at his PMDs office.  He will follow up with neurology in a month.  He has mild deficits to his left hand and his  is not as strong.    Plan:  1.   Heart failure medications:  - Beta blocker therapy with carvedilol 12.5 mg twice a day  - ACEI therapy with enalapril 10 mg daily  - Aldosterone blocker therapy with spironolactone 12.5 mg daily.  - Diuretic therapy with furosemide 40 mg daily  2.  Continue current medications.    3.  BMP pending.  May increase Spironolactone if labs stable  4.  Continue daily weights and low-sodium diet  5.  Stressed the importance of wearing his LifeVest  6.  Follow-up with PMD regarding anxiety  7.  Provided him a work note until he sees Dr. Oliveira on April 26    Matthew W Behr will follow up with Dr. Oliveira April 26 and in the heart failure clinic in 2 weeks.     History of Present Illness/Subjective    Mr. Matthew W Behr is a 56  46 year old male with history of atrial fibrillation s/p cardioversion who presents for suicidal ideation admitted to psychiatry service now with fall and concern for atrial fibrillation on EKG, medicine consulted for a fib management.     #Atrial Fibrillation  Discussed with patient cardiologist Florencio Hernandez ([phone]) the patient was cardioverted in 2013.  Since that time the patient has been in sinus rhythm and has not had any further procedures. CHADSVASC score is 0 at this.  Upon review of EKG the patient appears to be in normal sinus rhythm, albeit there is significant artifact.   -Please repeat EKG now  -There is no indication for anticoagulation  -The patient does not require rate control  -If the patient becomes tachycardic again please repeat EKG - decision to start rate control may be necessary if patient is in afib.   -Follow up head CT     Medicine will continue to follow   Recs are final after attending signature  y.o. male seen at [facility] heart failure clinic today for continued follow-up.  His ex-wife Margie accompanies him today.  He follows up for heart failure with reduced ejection fraction.  He was recently hospitalized due to acute heart failure.  During hospitalization he had an echocardiogram which showed ejection fraction of 17% with mild to moderate mitral regurgitation.  He was diuresed and symptoms were improved.  He had stopped taking his medications for a year.  During hospitalization he had a CVA to the right MCA.  He has mild deficit in his left hand.  He follows up with neurology in 1 month.  He was discharged on a LifeVest and restarted his medications.  He has a past medical history significant for hypertension, anxiety.    Today, he comes in with mild fatigue and dyspnea on exertion.  His main complaint is anxiety.  He states he has trouble sleeping due to this.  He denies orthopnea, PND or edema.  He will follow up with his PMD regarding his anxiety.  He denies lightheadedness, shortness of breath, orthopnea, PND, palpitations, chest pain, abdominal fullness/bloating and lower extremity edema.      He is monitoring home weights which are stable.  He is following a low sodium diet.     ECHOCARDIOGRAM: 3/18/2021  Summary         Left ventricle is mildly dilated with mild hypertrophy.    Left ventricle ejection fraction is severely decreased. The calculated left ventricular ejection fraction is 17%.    Normal right ventricular size. The systolic function is mildly reduced. TAPSE is abnormal, which is consistent with abnormal right ventricular systolic function.    Mild to moderate mitral regurgitation is present.    The aortic root is mildly dilated.    When compared to the previous study dated 6/20/2019, there has been a significant decline in left ventricular systolic function. Also, the right ventricle now appears to have mild dysfunction.          Physical Examination Review of Systems   Vitals:     03/25/21 0844   BP: 108/66   Pulse: 66   Resp: 16   SpO2: 91%     Body mass index is 26.73 kg/m .  Wt Readings from Last 3 Encounters:   03/25/21 181 lb (82.1 kg)   03/21/21 179 lb 3.2 oz (81.3 kg)   03/17/21 187 lb (84.8 kg)       General Appearance:   no acute distress   ENT/Mouth: membranes moist, no oral lesions or bleeding gums.      EYES:  no scleral icterus, normal conjunctivae   Neck: no carotid bruits or thyromegaly   Chest/Lungs:   lungs are clear to auscultation, no rales or wheezing, equal chest wall expansion    Cardiovascular:   Regular. Normal first and second heart sounds with no murmurs, rubs, or gallops; Jugular venous pressure normal, no edema bilaterally    Abdomen:  no organomegaly, masses, bruits, or tenderness; bowel sounds are present   Extremities: no cyanosis or clubbing   Skin: no xanthelasma, warm.    Neurologic: normal  bilateral, no tremors     Psychiatric: alert and oriented x3    General: WNL  Eyes: WNL  Ears/Nose/Throat: WNL  Lungs: Wheezing  Heart: Shortness of Breath with activity  Stomach: WNL  Bladder: WNL  Muscle/Joints: Muscle Weakness  Skin: WNL  Nervous System: WNL  Mental Health: Anxiety     Blood: WNL     Medical History  Surgical History Family History Social History   Past Medical History:   Diagnosis Date   ? Acute respiratory failure with hypoxia (H)    ? Amphetamine urine screen positive 12/31/2017   ? Asthma    ? Cellulitis     Left hand   ? Chest pain with normal CT coronary angiography 1/3/2018   ? Nonischemic cardiomyopathy (H) 1/10/2018   ? Janel Parkinson White pattern seen on electrocardiogram     Past Surgical History:   Procedure Laterality Date   ? OTHER SURGICAL HISTORY  About 10 + years ago    Cardiac surgery for Janel Parkinson at [facility]    Family History   Problem Relation Age of Onset   ? COPD Mother     Social History     Socioeconomic History   ? Marital status:      Spouse name: Not on file   ? Number of children: Not on file   ?  Years of education: Not on file   ? Highest education level: Not on file   Occupational History     Employer: [employer]   Social Needs   ? Financial resource strain: Not on file   ? Food insecurity     Worry: Not on file     Inability: Not on file   ? Transportation needs     Medical: Not on file     Non-medical: Not on file   Tobacco Use   ? Smoking status: Never Smoker   ? Smokeless tobacco: Current User     Types: Chew   Substance and Sexual Activity   ? Alcohol use: Yes     Comment: 5 beers per week   ? Drug use: No   ? Sexual activity: Not on file   Lifestyle   ? Physical activity     Days per week: Not on file     Minutes per session: Not on file   ? Stress: Not on file   Relationships   ? Social connections     Talks on phone: Not on file     Gets together: Not on file     Attends L[employer] service: Not on file     Active member of club or organization: Not on file     Attends meetings of clubs or organizations: Not on file     Relationship status: Not on file   ? Intimate partner violence     Fear of current or ex partner: Not on file     Emotionally abused: Not on file     Physically abused: Not on file     Forced sexual activity: Not on file   Other Topics Concern   ? Not on file   Social History Narrative   ? Not on file          Medications  Allergies   Current Outpatient Medications   Medication Sig Dispense Refill   ? aspirin 81 MG EC tablet Take 1 tablet (81 mg total) by mouth daily.  0   ? atorvastatin (LIPITOR) 40 MG tablet Take 1 tablet (40 mg total) by mouth daily. 90 tablet 0   ? carvedilol (COREG) 12.5 MG tablet Take 1 tablet (12.5 mg total) by mouth 2 (two) times a day with meals. 180 tablet 3   ? enalapril (VASOTEC) 10 MG tablet Take 1 tablet (10 mg total) by mouth daily. 90 tablet 0   ? furosemide (LASIX) 40 MG tablet Take 1 tablet (40 mg total) by mouth daily. 90 tablet 0   ? hydrOXYzine HCL (ATARAX) 25 MG tablet Take 1 tablet (25 mg total) by mouth every 6 (six) hours as needed for  anxiety. 12 tablet 0   ? spironolactone (ALDACTONE) 25 MG tablet Take 0.5 tablets (12.5 mg total) by mouth daily. 45 tablet 0   ? VENTOLIN HFA 90 mcg/actuation inhaler Take 2 puffs by mouth every 4 (four) hours as needed.         0   ? warfarin ANTICOAGULANT (COUMADIN/JANTOVEN) 3 MG tablet Take 1 tablets (3 mg) by mouth daily. Adjust dose based on INR results as directed. 30 tablet 0     No current facility-administered medications for this visit.     No Known Allergies      Lab Results    Chemistry/lipid CBC Cardiac Enzymes/BNP/TSH/INR   Lab Results   Component Value Date    CHOL 245 (H) 03/18/2021    HDL 49 03/18/2021    LDLCALC 147 (H) 03/18/2021    TRIG 243 (H) 03/18/2021    CREATININE 1.13 03/20/2021    BUN 34 (H) 03/20/2021    K 4.3 03/20/2021     (L) 03/20/2021    CL 98 03/20/2021    CO2 28 03/20/2021    Lab Results   Component Value Date    WBC 10.1 03/20/2021    HGB 16.4 03/20/2021    HCT 47.8 03/20/2021    MCV 88 03/20/2021     03/20/2021    Lab Results   Component Value Date    TROPONINI 0.03 03/18/2021     (H) 03/17/2021    TSH 1.96 04/22/2019    INR 1.13 (H) 03/22/2021             This note has been dictated using voice recognition software. Any grammatical, typographical, or context distortions are unintentional and inherent to the software    30 minutes spent on the date of encounter doing chart review, review of test results, interpretation with above tests, patient visit, documentation and discussion with family.

## 2022-10-28 NOTE — BH INPATIENT PSYCHIATRY PROGRESS NOTE - NSBHMETABOLIC_PSY_ALL_CORE_FT
BMI: BMI (kg/m2): 32.1 (10-23-22 @ 22:00)  HbA1c: A1C with Estimated Average Glucose Result: 5.6 % (10-26-22 @ 05:30)    Glucose: POCT Blood Glucose.: 118 mg/dL (10-28-22 @ 13:51)    BP: 120/76 (10-28-22 @ 17:01) (106/73 - 150/79)  Lipid Panel: Date/Time: 10-26-22 @ 05:30  Cholesterol, Serum: 144  Direct LDL: --  HDL Cholesterol, Serum: 33  Total Cholesterol/HDL Ration Measurement: --  Triglycerides, Serum: 113

## 2022-10-28 NOTE — CONSULT NOTE ADULT - SUBJECTIVE AND OBJECTIVE BOX
History: Patient is a 46 year-old-male with history of atrial fibrillation (cardioverted in 2013, follows with Florencio Hernandez), depression, who presented to the ED with suicidal thoughts.  The patient believed that his depression was related to his sobriety.    Subjective: Patient stating that he had felt dizzy and then fell, he was complaining of palpitations at that time but denies chest pain.  Currently patient denies any palpitations, dizziness, chest pain, or shortness of breath.      VITALS  Vital Signs Last 24 Hrs  T(C): 36.5 (28 Oct 2022 08:05), Max: 36.6 (27 Oct 2022 16:25)  T(F): 97.7 (28 Oct 2022 08:05), Max: 97.8 (27 Oct 2022 16:25)  HR: 60 (28 Oct 2022 08:05) (60 - 84)  BP: 132/86 (28 Oct 2022 08:05) (106/73 - 132/86)  BP(mean): --  RR: 18 (28 Oct 2022 08:05) (18 - 18)  SpO2: 98% (28 Oct 2022 08:05) (96% - 98%)    Parameters below as of 28 Oct 2022 08:05  Patient On (Oxygen Delivery Method): room air        CAPILLARY BLOOD GLUCOSE      POCT Blood Glucose.: 118 mg/dL (28 Oct 2022 13:51)      PHYSICAL EXAM  General: A&Ox3; NAD  Head: NC/AT; MMM; PERRL; EOMI;  Neck: Supple; no JVD  Respiratory: CTA B/L; no wheezes/crackles   Cardiovascular: Regular rhythm/rate; S1/S2   Gastrointestinal: Soft; NTND; normoactive BS  Extremities: WWP; no edema/cyanosis  Neurological:  CNII-XII grossly intact; no obvious focal deficits    MEDICATIONS  (STANDING):  cyanocobalamin 1000 MICROGram(s) Oral daily  folic acid 1 milliGRAM(s) Oral daily  gabapentin 300 milliGRAM(s) Oral three times a day  haloperidol     Tablet 5 milliGRAM(s) Oral at bedtime  influenza   Vaccine 0.5 milliLiter(s) IntraMuscular once  lithium 600 milliGRAM(s) Oral at bedtime  losartan 50 milliGRAM(s) Oral daily  multivitamin 1 Tablet(s) Oral daily  nicotine - 21 mG/24Hr(s) Patch 1 Patch Transdermal daily  pantoprazole    Tablet 40 milliGRAM(s) Oral before breakfast  traZODone 50 milliGRAM(s) Oral at bedtime    MEDICATIONS  (PRN):  acetaminophen     Tablet .. 650 milliGRAM(s) Oral every 6 hours PRN Moderate Pain (4 - 6)  aluminum hydroxide/magnesium hydroxide/simethicone Suspension 30 milliLiter(s) Oral every 6 hours PRN Dyspepsia  diphenhydrAMINE 50 milliGRAM(s) Oral every 6 hours PRN agitation  haloperidol     Tablet 5 milliGRAM(s) Oral every 6 hours PRN agitation  ibuprofen  Tablet. 600 milliGRAM(s) Oral once PRN Moderate Pain (4 - 6)  LORazepam     Tablet 2 milliGRAM(s) Oral every 6 hours PRN agitation  nicotine  Polacrilex Gum 2 milliGRAM(s) Oral every 2 hours PRN NRT      No Known Allergies      LABS    EKG: NSR with no signs of ischemia

## 2022-10-28 NOTE — BH INPATIENT PSYCHIATRY PROGRESS NOTE - NSBHCHARTREVIEWVS_PSY_A_CORE FT
Vital Signs Last 24 Hrs  T(C): 36.8 (10-28-22 @ 17:01), Max: 36.8 (10-28-22 @ 17:01)  T(F): 98.2 (10-28-22 @ 17:01), Max: 98.2 (10-28-22 @ 17:01)  HR: 64 (10-28-22 @ 17:01) (60 - 64)  BP: 120/76 (10-28-22 @ 17:01) (120/76 - 132/86)  BP(mean): --  RR: 18 (10-28-22 @ 17:01) (18 - 18)  SpO2: 97% (10-28-22 @ 17:01) (97% - 98%)

## 2022-10-28 NOTE — BH CHART NOTE - NSEVENTNOTEFT_PSY_ALL_CORE
Patient noted to have sustained fall ~1400 seen laying on floor, not alert on approach, requiring sternal rub. He states that he felt dizzy and fell. Had recently received po prns of haldol 5mg/ativan 2mg for reported avh. , /108, 145/100, temp 98.7, HR 68. Pt reports having pain at point of contact. EKG +afib    Neuro assessment:   CN I : not tested  CN II : not tested  CN III, IV, VI : PERRLA, EOMI  CN VIII: Left > Right  CN IX, X : no hoarseness, palate/uvula rise   CN XI: SCM/Trapezius strength intact bilateral  CN XII: no dysarthria, tongue, weakness/fasticulation.   Motor: Normal muscle bulk/tone. Good posture. Strength 5+/5+ upper&lower extremities.  Sensation: Sensation to light touch UE > LE. Noted mislocalization to light touch, and poor two point discrimination.  Cerebellar Function: Normal gait. Tandem walk with support, Heel and toe walking with support. Romberg and pronator drift negative. Normal rapid alternating movements.    Medicine consult placed for recs regarding afib. PRN motrin requested  
Physical examination: Agree    GENERAL: NAD, comfortable, ambulating  HEAD:  normocephalic, atraumatic  EYES: extraocular movements intact, no conjunctival injection or scleral icterus; pupils equal, round, reactive  ENT: Moist mucous membranes.   NECK: Supple, No JVD  CHEST/LUNG: Clear to auscultation bilaterally; normal respiratory effort  HEART: Regular rate and rhythm; No murmurs, rubs, or gallops  ABDOMEN: soft, nontender, non-distended  EXTREMITIES:  no extremity edema; warm and well-perfused  NERVOUS SYSTEM:  A&Ox3, no focal deficits   SKIN: No rashes or lesions

## 2022-10-28 NOTE — CONSULT NOTE ADULT - ATTENDING COMMENTS
pt evaluated with Dr. Rodriges. records/ekg reviewed.   pt seen sitting on the chair, reported pain in the head, CTH done pending read.   reported while walking around he felt dizzy and fell, no chest pain, mild palpitation.    ekg on 10/23 is sinus 76 bpm  ekg 10/28 is sinus with artifact. -not afib.  on exam - pt appear comfortable, was sleepy appearing initially. eomi, perrl , CVS- RRR no murmur, lungs good air entry bilaterally.   Ext- no edema, no tenderness, neuro aao x 3 non focal.    Patient is a 46 year-old-male with history of atrial fibrillation (cardioverted in 2013, follows with Florencio Hernandez), depression, who presented to the ED with suicidal thoughts.  The patient believed that his depression was related to his sobriety.    - fall with dizziness- ekg obtained is Sinus , not afib. clinical exam- good rate/rhythm.   - Hx of Afib had cardioversion in 2013 and has been in Sinus since then, able to provide cardiologist name Dr. Peter Hernandez has follow up apt on November 10,22- Dr. Rodriges was able to contact card office and confirmed pt on sinus/survillance/no meds required.  - to repeat EKG - if afib -please notify us - might need cardiology consult if Afib seen ( would need rate control vs rhythm control (cardioversion)    - Dizziness- could be due to medication, pt on gabapentin/haldol/trazodone.    - HTN - on amlodipine -to continue for now.    -Thank you for allowing to participate in the care of this patient. elizabeth Hernandez. pt evaluated with Dr. Rodriges. records/ekg reviewed.   pt seen sitting on the chair, reported pain in the head, CTH done pending read.   reported while walking around he felt dizzy and fell, no chest pain, mild palpitation.    ekg on 10/23 is sinus 76 bpm  ekg 10/28 is sinus with artifact. -not afib.  on exam - pt appear comfortable, was sleepy appearing initially. eomi, perrl , CVS- RRR no murmur, lungs good air entry bilaterally.   Ext- no edema, no tenderness, neuro aao x 3 non focal.    Patient is a 46 year-old-male with history of atrial fibrillation (cardioverted in 2013, follows with Florencio Hernandez), depression, who presented to the ED with suicidal thoughts.  The patient believed that his depression was related to his sobriety.     - fall with dizziness- ekg obtained is Sinus , not afib. clinical exam- good rate/rhythm. CTH negative, No hypoglycemia.   - Hx of Afib had cardioversion in 2013 and has been in Sinus since then, able to provide cardiologist name Dr. Peter Hernandez has follow up apt on November 10,22- Dr. Rodriges was able to contact card office and confirmed pt on sinus/survillance/no meds required.  - to repeat EKG - if afib -please notify us - might need cardiology consult if Afib seen ( would need rate control vs rhythm control (cardioversion)    - Dizziness- could be due to medication, pt on gabapentin/haldol/trazodone.    - HTN - on amlodipine -to continue for now.    -Thank you for allowing to participate in the care of this patient. dw .

## 2022-10-29 PROCEDURE — 70450 CT HEAD/BRAIN W/O DYE: CPT | Mod: 26

## 2022-10-29 PROCEDURE — 99231 SBSQ HOSP IP/OBS SF/LOW 25: CPT | Mod: GC

## 2022-10-29 RX ADMIN — Medication 50 MILLIGRAM(S): at 18:23

## 2022-10-29 RX ADMIN — Medication 1 PATCH: at 22:00

## 2022-10-29 RX ADMIN — LOSARTAN POTASSIUM 50 MILLIGRAM(S): 100 TABLET, FILM COATED ORAL at 10:03

## 2022-10-29 RX ADMIN — HALOPERIDOL DECANOATE 5 MILLIGRAM(S): 100 INJECTION INTRAMUSCULAR at 20:44

## 2022-10-29 RX ADMIN — GABAPENTIN 300 MILLIGRAM(S): 400 CAPSULE ORAL at 10:03

## 2022-10-29 RX ADMIN — Medication 1 MILLIGRAM(S): at 10:02

## 2022-10-29 RX ADMIN — Medication 1 TABLET(S): at 10:03

## 2022-10-29 RX ADMIN — GABAPENTIN 300 MILLIGRAM(S): 400 CAPSULE ORAL at 20:44

## 2022-10-29 RX ADMIN — PANTOPRAZOLE SODIUM 40 MILLIGRAM(S): 20 TABLET, DELAYED RELEASE ORAL at 07:08

## 2022-10-29 RX ADMIN — GABAPENTIN 300 MILLIGRAM(S): 400 CAPSULE ORAL at 12:56

## 2022-10-29 RX ADMIN — LITHIUM CARBONATE 600 MILLIGRAM(S): 300 TABLET, EXTENDED RELEASE ORAL at 20:43

## 2022-10-29 RX ADMIN — Medication 650 MILLIGRAM(S): at 18:00

## 2022-10-29 RX ADMIN — HALOPERIDOL DECANOATE 5 MILLIGRAM(S): 100 INJECTION INTRAMUSCULAR at 18:23

## 2022-10-29 RX ADMIN — Medication 2 MILLIGRAM(S): at 10:21

## 2022-10-29 RX ADMIN — Medication 650 MILLIGRAM(S): at 17:06

## 2022-10-29 RX ADMIN — Medication 1 PATCH: at 10:03

## 2022-10-29 RX ADMIN — Medication 2 MILLIGRAM(S): at 18:24

## 2022-10-29 RX ADMIN — Medication 650 MILLIGRAM(S): at 12:21

## 2022-10-29 RX ADMIN — PREGABALIN 1000 MICROGRAM(S): 225 CAPSULE ORAL at 10:02

## 2022-10-29 RX ADMIN — Medication 50 MILLIGRAM(S): at 20:44

## 2022-10-29 RX ADMIN — Medication 650 MILLIGRAM(S): at 10:02

## 2022-10-29 NOTE — PROVIDER CONTACT NOTE (OTHER) - ACTION/TREATMENT ORDERED:
- haldol 5mg and ativan 2mg oral per PRN orders in EMR  - NP Steven will come speak with him
patient was asked to seat infront  of nursing station

## 2022-10-29 NOTE — PROGRESS NOTE ADULT - ATTENDING COMMENTS
no event reported, pt is participating in group activity.    Patient is a 46 year-old-male with history of atrial fibrillation (cardioverted in 2013, follows with Florencio Hernandez)- has been in Sinus Rhythm since then,  depression, who presented to the ED with suicidal thoughts.  The patient believed that his depression was related to his sobriety.     - fall with dizziness- ekg obtained is Sinus , not afib. clinical exam- good rate/rhythm. CTH negative, No hypoglycemia.   - Hx of Afib had cardioversion in 2013 and has been in Sinus since then, able to provide cardiologist name Dr. Peter Hernandez has follow up apt on November 10,22- Dr. Rodriges was able to contact card office and confirmed pt on sinus/survillance/no meds required.  - to repeat EKG - if afib -please notify us - might need cardiology consult if Afib seen ( would need rate control vs rhythm control (cardioversion)    - Dizziness- could be due to medication, pt on gabapentin 300 tid) /haldol/trazodone.    - HTN - on amlodipine -to continue for now.    -Thank you for allowing to participate in the care of this patient. elizabeth Hernandez. RN

## 2022-10-30 DIAGNOSIS — F31.9 BIPOLAR DISORDER, UNSPECIFIED: ICD-10-CM

## 2022-10-30 PROCEDURE — 99231 SBSQ HOSP IP/OBS SF/LOW 25: CPT

## 2022-10-30 PROCEDURE — 99232 SBSQ HOSP IP/OBS MODERATE 35: CPT

## 2022-10-30 RX ORDER — LITHIUM CARBONATE 300 MG/1
900 TABLET, EXTENDED RELEASE ORAL AT BEDTIME
Refills: 0 | Status: DISCONTINUED | OUTPATIENT
Start: 2022-10-31 | End: 2022-11-03

## 2022-10-30 RX ORDER — CHLORPROMAZINE HCL 10 MG
100 TABLET ORAL ONCE
Refills: 0 | Status: DISCONTINUED | OUTPATIENT
Start: 2022-10-30 | End: 2022-10-30

## 2022-10-30 RX ADMIN — GABAPENTIN 300 MILLIGRAM(S): 400 CAPSULE ORAL at 09:19

## 2022-10-30 RX ADMIN — Medication 2 MILLIGRAM(S): at 12:15

## 2022-10-30 RX ADMIN — Medication 1 PATCH: at 12:33

## 2022-10-30 RX ADMIN — Medication 1 TABLET(S): at 09:19

## 2022-10-30 RX ADMIN — Medication 2 MILLIGRAM(S): at 18:20

## 2022-10-30 RX ADMIN — Medication 1 PATCH: at 07:25

## 2022-10-30 RX ADMIN — LOSARTAN POTASSIUM 50 MILLIGRAM(S): 100 TABLET, FILM COATED ORAL at 09:19

## 2022-10-30 RX ADMIN — GABAPENTIN 300 MILLIGRAM(S): 400 CAPSULE ORAL at 12:36

## 2022-10-30 RX ADMIN — Medication 650 MILLIGRAM(S): at 21:27

## 2022-10-30 RX ADMIN — HALOPERIDOL DECANOATE 5 MILLIGRAM(S): 100 INJECTION INTRAMUSCULAR at 21:26

## 2022-10-30 RX ADMIN — Medication 2 MILLIGRAM(S): at 09:20

## 2022-10-30 RX ADMIN — PANTOPRAZOLE SODIUM 40 MILLIGRAM(S): 20 TABLET, DELAYED RELEASE ORAL at 07:07

## 2022-10-30 RX ADMIN — Medication 650 MILLIGRAM(S): at 22:19

## 2022-10-30 RX ADMIN — Medication 50 MILLIGRAM(S): at 21:26

## 2022-10-30 RX ADMIN — HALOPERIDOL DECANOATE 5 MILLIGRAM(S): 100 INJECTION INTRAMUSCULAR at 09:20

## 2022-10-30 RX ADMIN — GABAPENTIN 300 MILLIGRAM(S): 400 CAPSULE ORAL at 21:26

## 2022-10-30 RX ADMIN — Medication 1 MILLIGRAM(S): at 09:19

## 2022-10-30 NOTE — BH INPATIENT PSYCHIATRY PROGRESS NOTE - NSBHMETABOLIC_PSY_ALL_CORE_FT
BMI: BMI (kg/m2): 32.1 (10-23-22 @ 22:00)  HbA1c: A1C with Estimated Average Glucose Result: 5.6 % (10-26-22 @ 05:30)    Glucose: POCT Blood Glucose.: 118 mg/dL (10-28-22 @ 13:51)    BP: 155/88 (10-29-22 @ 17:00) (106/73 - 169/139)  Lipid Panel: Date/Time: 10-26-22 @ 05:30  Cholesterol, Serum: 144  Direct LDL: --  HDL Cholesterol, Serum: 33  Total Cholesterol/HDL Ration Measurement: --  Triglycerides, Serum: 113   BMI: BMI (kg/m2): 32.1 (10-23-22 @ 22:00)  HbA1c: A1C with Estimated Average Glucose Result: 5.6 % (10-26-22 @ 05:30)    Glucose: POCT Blood Glucose.: 118 mg/dL (10-28-22 @ 13:51)    BP: 125/91 (10-30-22 @ 09:00) (106/73 - 169/139)  Lipid Panel: Date/Time: 10-26-22 @ 05:30  Cholesterol, Serum: 144  Direct LDL: --  HDL Cholesterol, Serum: 33  Total Cholesterol/HDL Ration Measurement: --  Triglycerides, Serum: 113

## 2022-10-30 NOTE — BH INPATIENT PSYCHIATRY PROGRESS NOTE - CURRENT MEDICATION
MEDICATIONS  (STANDING):  cyanocobalamin 1000 MICROGram(s) Oral daily  folic acid 1 milliGRAM(s) Oral daily  gabapentin 300 milliGRAM(s) Oral three times a day  haloperidol     Tablet 5 milliGRAM(s) Oral at bedtime  influenza   Vaccine 0.5 milliLiter(s) IntraMuscular once  lithium 600 milliGRAM(s) Oral at bedtime  losartan 50 milliGRAM(s) Oral daily  multivitamin 1 Tablet(s) Oral daily  nicotine - 21 mG/24Hr(s) Patch 1 Patch Transdermal daily  pantoprazole    Tablet 40 milliGRAM(s) Oral before breakfast  traZODone 50 milliGRAM(s) Oral at bedtime    MEDICATIONS  (PRN):  acetaminophen     Tablet .. 650 milliGRAM(s) Oral every 6 hours PRN Moderate Pain (4 - 6)  aluminum hydroxide/magnesium hydroxide/simethicone Suspension 30 milliLiter(s) Oral every 6 hours PRN Dyspepsia  diphenhydrAMINE 50 milliGRAM(s) Oral every 6 hours PRN agitation  haloperidol     Tablet 5 milliGRAM(s) Oral every 6 hours PRN agitation  LORazepam     Tablet 2 milliGRAM(s) Oral every 6 hours PRN agitation  nicotine  Polacrilex Gum 2 milliGRAM(s) Oral every 2 hours PRN NRT   MEDICATIONS  (STANDING):  cyanocobalamin 1000 MICROGram(s) Oral daily  folic acid 1 milliGRAM(s) Oral daily  gabapentin 300 milliGRAM(s) Oral three times a day  haloperidol     Tablet 5 milliGRAM(s) Oral at bedtime  influenza   Vaccine 0.5 milliLiter(s) IntraMuscular once  losartan 50 milliGRAM(s) Oral daily  multivitamin 1 Tablet(s) Oral daily  nicotine - 21 mG/24Hr(s) Patch 1 Patch Transdermal daily  pantoprazole    Tablet 40 milliGRAM(s) Oral before breakfast  traZODone 50 milliGRAM(s) Oral at bedtime    MEDICATIONS  (PRN):  acetaminophen     Tablet .. 650 milliGRAM(s) Oral every 6 hours PRN Moderate Pain (4 - 6)  aluminum hydroxide/magnesium hydroxide/simethicone Suspension 30 milliLiter(s) Oral every 6 hours PRN Dyspepsia  diphenhydrAMINE 50 milliGRAM(s) Oral every 6 hours PRN agitation  haloperidol     Tablet 5 milliGRAM(s) Oral every 6 hours PRN agitation  LORazepam     Tablet 2 milliGRAM(s) Oral every 6 hours PRN agitation  nicotine  Polacrilex Gum 2 milliGRAM(s) Oral every 2 hours PRN NRT

## 2022-10-30 NOTE — PROGRESS NOTE ADULT - ATTENDING COMMENTS
per RN -pt has been well, no event reported. pt is participating in group activity.    Patient is a 46 year-old-male with history of atrial fibrillation (cardioverted in 2013, follows with Florencio Hernandez)- has been in Sinus Rhythm since then,  depression, who presented to the ED with suicidal thoughts.  The patient believed that his depression was related to his sobriety.     - fall with dizziness- ekg obtained is Sinus , not afib. clinical exam- good rate/rhythm. CTH negative, No hypoglycemia.   - Hx of Afib had cardioversion in 2013 and has been in Sinus since then, able to provide cardiologist name Dr. Peter Hernandez has follow up apt on November 10,22- Dr. Rodriges was able to contact card office and confirmed pt on sinus/survillance/no meds required.  -no new EKG- since pt remain stable , to follow up with Dr. Hernandez as out patient.   - if afib noted-please notify us - might need cardiology consult if Afib seen ( would need rate control vs rhythm control (cardioversion)    - Dizziness- could be due to medication, pt on gabapentin 300 tid) /haldol/trazodone.    - HTN - on amlodipine -to continue for now.    -Thank you for allowing to participate in the care of this patient.. RN

## 2022-10-30 NOTE — BH INPATIENT PSYCHIATRY PROGRESS NOTE - NSBHCHARTREVIEWVS_PSY_A_CORE FT
Vital Signs Last 24 Hrs  T(C): 36.5 (10-29-22 @ 17:00), Max: 36.5 (10-29-22 @ 17:00)  T(F): 97.7 (10-29-22 @ 17:00), Max: 97.7 (10-29-22 @ 17:00)  HR: 61 (10-29-22 @ 17:00) (59 - 61)  BP: 155/88 (10-29-22 @ 17:00) (131/87 - 155/88)  BP(mean): --  RR: 17 (10-29-22 @ 17:00) (17 - 18)  SpO2: 97% (10-29-22 @ 17:00) (96% - 97%)     Vital Signs Last 24 Hrs  T(C): 36.6 (10-30-22 @ 09:00), Max: 36.6 (10-30-22 @ 09:00)  T(F): 97.9 (10-30-22 @ 09:00), Max: 97.9 (10-30-22 @ 09:00)  HR: 59 (10-30-22 @ 09:00) (59 - 61)  BP: 125/91 (10-30-22 @ 09:00) (125/91 - 155/88)  BP(mean): --  RR: 18 (10-30-22 @ 09:00) (17 - 18)  SpO2: 96% (10-30-22 @ 09:00) (96% - 97%)

## 2022-10-31 LAB
ALBUMIN SERPL ELPH-MCNC: 4.2 G/DL — SIGNIFICANT CHANGE UP (ref 3.3–5)
ALP SERPL-CCNC: 80 U/L — SIGNIFICANT CHANGE UP (ref 40–120)
ALT FLD-CCNC: 35 U/L — SIGNIFICANT CHANGE UP (ref 10–45)
ANION GAP SERPL CALC-SCNC: 11 MMOL/L — SIGNIFICANT CHANGE UP (ref 5–17)
AST SERPL-CCNC: 19 U/L — SIGNIFICANT CHANGE UP (ref 10–40)
BILIRUB SERPL-MCNC: 0.2 MG/DL — SIGNIFICANT CHANGE UP (ref 0.2–1.2)
BUN SERPL-MCNC: 18 MG/DL — SIGNIFICANT CHANGE UP (ref 7–23)
CALCIUM SERPL-MCNC: 9.7 MG/DL — SIGNIFICANT CHANGE UP (ref 8.4–10.5)
CHLORIDE SERPL-SCNC: 101 MMOL/L — SIGNIFICANT CHANGE UP (ref 96–108)
CO2 SERPL-SCNC: 26 MMOL/L — SIGNIFICANT CHANGE UP (ref 22–31)
CREAT SERPL-MCNC: 0.98 MG/DL — SIGNIFICANT CHANGE UP (ref 0.5–1.3)
EGFR: 98 ML/MIN/1.73M2 — SIGNIFICANT CHANGE UP
GLUCOSE SERPL-MCNC: 112 MG/DL — HIGH (ref 70–99)
HCT VFR BLD CALC: 42.1 % — SIGNIFICANT CHANGE UP (ref 39–50)
HGB BLD-MCNC: 14.4 G/DL — SIGNIFICANT CHANGE UP (ref 13–17)
MCHC RBC-ENTMCNC: 31.4 PG — SIGNIFICANT CHANGE UP (ref 27–34)
MCHC RBC-ENTMCNC: 34.2 GM/DL — SIGNIFICANT CHANGE UP (ref 32–36)
MCV RBC AUTO: 91.9 FL — SIGNIFICANT CHANGE UP (ref 80–100)
NRBC # BLD: 0 /100 WBCS — SIGNIFICANT CHANGE UP (ref 0–0)
PLATELET # BLD AUTO: 174 K/UL — SIGNIFICANT CHANGE UP (ref 150–400)
POTASSIUM SERPL-MCNC: 4.2 MMOL/L — SIGNIFICANT CHANGE UP (ref 3.5–5.3)
POTASSIUM SERPL-SCNC: 4.2 MMOL/L — SIGNIFICANT CHANGE UP (ref 3.5–5.3)
PROT SERPL-MCNC: 6.8 G/DL — SIGNIFICANT CHANGE UP (ref 6–8.3)
RAPID RVP RESULT: SIGNIFICANT CHANGE UP
RBC # BLD: 4.58 M/UL — SIGNIFICANT CHANGE UP (ref 4.2–5.8)
RBC # FLD: 12.1 % — SIGNIFICANT CHANGE UP (ref 10.3–14.5)
SARS-COV-2 RNA SPEC QL NAA+PROBE: SIGNIFICANT CHANGE UP
SODIUM SERPL-SCNC: 138 MMOL/L — SIGNIFICANT CHANGE UP (ref 135–145)
WBC # BLD: 5.32 K/UL — SIGNIFICANT CHANGE UP (ref 3.8–10.5)
WBC # FLD AUTO: 5.32 K/UL — SIGNIFICANT CHANGE UP (ref 3.8–10.5)

## 2022-10-31 PROCEDURE — 71045 X-RAY EXAM CHEST 1 VIEW: CPT | Mod: 26

## 2022-10-31 PROCEDURE — 99233 SBSQ HOSP IP/OBS HIGH 50: CPT

## 2022-10-31 PROCEDURE — 99231 SBSQ HOSP IP/OBS SF/LOW 25: CPT | Mod: GC

## 2022-10-31 RX ORDER — IBUPROFEN 200 MG
400 TABLET ORAL EVERY 8 HOURS
Refills: 0 | Status: DISCONTINUED | OUTPATIENT
Start: 2022-10-31 | End: 2022-11-03

## 2022-10-31 RX ORDER — ACETAMINOPHEN 500 MG
650 TABLET ORAL EVERY 6 HOURS
Refills: 0 | Status: DISCONTINUED | OUTPATIENT
Start: 2022-10-31 | End: 2022-10-31

## 2022-10-31 RX ORDER — RISPERIDONE 4 MG/1
1 TABLET ORAL AT BEDTIME
Refills: 0 | Status: DISCONTINUED | OUTPATIENT
Start: 2022-10-31 | End: 2022-11-03

## 2022-10-31 RX ORDER — IPRATROPIUM/ALBUTEROL SULFATE 18-103MCG
3 AEROSOL WITH ADAPTER (GRAM) INHALATION ONCE
Refills: 0 | Status: COMPLETED | OUTPATIENT
Start: 2022-10-31 | End: 2022-10-31

## 2022-10-31 RX ADMIN — Medication 650 MILLIGRAM(S): at 18:49

## 2022-10-31 RX ADMIN — Medication 1 TABLET(S): at 10:03

## 2022-10-31 RX ADMIN — Medication 50 MILLIGRAM(S): at 21:33

## 2022-10-31 RX ADMIN — GABAPENTIN 300 MILLIGRAM(S): 400 CAPSULE ORAL at 21:33

## 2022-10-31 RX ADMIN — HALOPERIDOL DECANOATE 5 MILLIGRAM(S): 100 INJECTION INTRAMUSCULAR at 14:07

## 2022-10-31 RX ADMIN — Medication 2 MILLIGRAM(S): at 10:10

## 2022-10-31 RX ADMIN — GABAPENTIN 300 MILLIGRAM(S): 400 CAPSULE ORAL at 14:04

## 2022-10-31 RX ADMIN — LITHIUM CARBONATE 900 MILLIGRAM(S): 300 TABLET, EXTENDED RELEASE ORAL at 21:33

## 2022-10-31 RX ADMIN — Medication 1 PATCH: at 10:04

## 2022-10-31 RX ADMIN — Medication 3 MILLILITER(S): at 18:49

## 2022-10-31 RX ADMIN — Medication 650 MILLIGRAM(S): at 19:14

## 2022-10-31 RX ADMIN — PREGABALIN 1000 MICROGRAM(S): 225 CAPSULE ORAL at 10:04

## 2022-10-31 RX ADMIN — Medication 1 PATCH: at 18:16

## 2022-10-31 RX ADMIN — LOSARTAN POTASSIUM 50 MILLIGRAM(S): 100 TABLET, FILM COATED ORAL at 10:04

## 2022-10-31 RX ADMIN — Medication 2 MILLIGRAM(S): at 14:07

## 2022-10-31 RX ADMIN — Medication 1 PATCH: at 06:13

## 2022-10-31 RX ADMIN — GABAPENTIN 300 MILLIGRAM(S): 400 CAPSULE ORAL at 10:03

## 2022-10-31 RX ADMIN — Medication 1 MILLIGRAM(S): at 10:03

## 2022-10-31 RX ADMIN — PANTOPRAZOLE SODIUM 40 MILLIGRAM(S): 20 TABLET, DELAYED RELEASE ORAL at 07:14

## 2022-10-31 RX ADMIN — RISPERIDONE 1 MILLIGRAM(S): 4 TABLET ORAL at 21:34

## 2022-10-31 NOTE — BH TREATMENT PLAN - NSTXDEPRESINTERMD_PSY_ALL_CORE
Psychopharmacology x15 minutes, will continue to titrate (Lithium and Haldol) medications as appropriate

## 2022-10-31 NOTE — BH TREATMENT PLAN - NSTXPLANTHERAPYSESSIONSFT_PSY_ALL_CORE
10-25-22  Type of therapy: Anxiety Management  Type of session: Group  Level of patient participation: Participates  Duration of participation: 60 minutes  Therapy conducted by: Psych rehab  Therapy Summary: At the start of group Pt endorsed having "my other half of me" sitting in chair next to him. Pt endorses seeing and hearing this part of himself as a separate entity, and endorses this self is critical and blaming and encourages him to hurt himself (e.g. cut his wrists) to feel the paint this other part of him thinks he has caused others. Pt appeared constricted and serious. Pt requested art materials to be placed in front of the empty chair for his other self. In a brief conversation with Pt at end of group, Pt reports experiencing this AH/VH for several months and states never having VH in the past, but endorses past history of AH. Pt was encouraged to speak with his N.P. about this and Pt was receptive to this information being shared with the team.  
  10-25-22  Type of therapy: Anxiety Management  Type of session: Group  Level of patient participation: Participates  Duration of participation: 60 minutes  Therapy conducted by: Psych rehab  Therapy Summary: At the start of group Pt endorsed having "my other half of me" sitting in chair next to him. Pt endorses seeing and hearing this part of himself as a separate entity, and endorses this self is critical and blaming and encourages him to hurt himself (e.g. cut his wrists) to feel the paint this other part of him thinks he has caused others. Pt appeared constricted and serious. Pt requested art materials to be placed in front of the empty chair for his other self. In a brief conversation with Pt at end of group, Pt reports experiencing this AH/VH for several months and states never having VH in the past, but endorses past history of AH. Pt was encouraged to speak with his N.P. about this and Pt was receptive to this information being shared with the team.

## 2022-10-31 NOTE — BH INPATIENT PSYCHIATRY PROGRESS NOTE - NSBHCHARTREVIEWVS_PSY_A_CORE FT
Vital Signs Last 24 Hrs  T(C): 36.6 (10-31-22 @ 09:00), Max: 36.6 (10-30-22 @ 17:00)  T(F): 97.8 (10-31-22 @ 09:00), Max: 97.8 (10-30-22 @ 17:00)  HR: 63 (10-31-22 @ 09:00) (63 - 63)  BP: 130/89 (10-31-22 @ 09:00) (128/86 - 130/89)  BP(mean): --  RR: 18 (10-31-22 @ 09:00) (18 - 18)  SpO2: 97% (10-31-22 @ 09:00) (97% - 99%)

## 2022-10-31 NOTE — BH PSYCHOLOGY - CLINICIAN PSYCHOTHERAPY NOTE - NSBHPSYCHOLNARRATIVE_PSY_A_CORE FT
Pt was seen by Juanis Rios M.A., psychology extern, and Roberto Park, PhD., psychology fellow. Pt fully participated in therapy session, thought process linear and goal directed, content appropriate to discussion, normal psychomotor movement was observed, mixed enthusiasms and depressive mood, congruent with content of discussion. Affect corresponded with content of discussion. SI present, passive thoughts of death as an option, with no expressed plan or intent. Follow up needed for further suicide assessment. No psychosis or timbo observed or reported.     Pt described concerns regarding maintaining sobriety due to "boredom" when he is not busy, which drives him to seek alcohol. He stated that he has been sober since September 2nd. He expressed hopelessness surrounding the future family relationships due to alcohol use. He described benefits to the group sessions in the hospital, a devotion to consistent AA meetings, and his relationship with his sponsor. Pt described that he entered the hospital after stopping use of medication prescribed for bipolar disorder.  Pt was seen by Juanis Rios M.A., psychology extern, and Roberto Park, PhD., psychology fellow. Pt fully participated in therapy session, thought process linear and goal directed, content appropriate to discussion, normal psychomotor movement was observed, mixed enthusiasms and depressive mood, congruent with content of discussion. Affect corresponded with content of discussion. SI present, passive thoughts of death as an option, with no expressed plan or intent. Follow up needed for further suicide assessment. No psychosis or timbo observed or reported.     Pt described concerns regarding maintaining sobriety due to "boredom" when he is not busy, which drives him to seek alcohol. He stated that he has been sober since September 2nd. He expressed hopelessness surrounding the future family relationships due to alcohol use. He described benefits to the group sessions in the hospital, a devotion to consistent AA meetings, and his relationship with his sponsor. Pt described that he entered the hospital after stopping use of medication prescribed for bipolar disorder.   Static: Hx of suicide attempts; substance abuse  Modifiable: current suicide ideation risk, mood  Protective: Asked for help, hx of sobriety

## 2022-10-31 NOTE — CHART NOTE - NSCHARTNOTEFT_GEN_A_CORE
Admitting Diagnosis:   Patient is a 46y old  Male who presents with a chief complaint of DEPRESSION; SUICIDALIDEATION     (25 Oct 2022 14:20)      PAST MEDICAL & SURGICAL HISTORY:      Current Nutrition Order:  Diet, Regular (10-25-22 @ 12:30)      PO Intake: Good (%) [   ]  Fair (50-75%) [   ] Poor (<25%) [   ]    GI Issues:     Pain:    Skin Integrity:    Labs:         CAPILLARY BLOOD GLUCOSE          Medications:  MEDICATIONS  (STANDING):  cyanocobalamin 1000 MICROGram(s) Oral daily  folic acid 1 milliGRAM(s) Oral daily  gabapentin 300 milliGRAM(s) Oral three times a day  haloperidol     Tablet 5 milliGRAM(s) Oral at bedtime  influenza   Vaccine 0.5 milliLiter(s) IntraMuscular once  lithium 900 milliGRAM(s) Oral at bedtime  losartan 50 milliGRAM(s) Oral daily  multivitamin 1 Tablet(s) Oral daily  nicotine - 21 mG/24Hr(s) Patch 1 Patch Transdermal daily  pantoprazole    Tablet 40 milliGRAM(s) Oral before breakfast  traZODone 50 milliGRAM(s) Oral at bedtime    MEDICATIONS  (PRN):  acetaminophen     Tablet .. 650 milliGRAM(s) Oral every 6 hours PRN Moderate Pain (4 - 6)  aluminum hydroxide/magnesium hydroxide/simethicone Suspension 30 milliLiter(s) Oral every 6 hours PRN Dyspepsia  diphenhydrAMINE 50 milliGRAM(s) Oral every 6 hours PRN agitation  haloperidol     Tablet 5 milliGRAM(s) Oral every 6 hours PRN agitation  LORazepam     Tablet 2 milliGRAM(s) Oral every 6 hours PRN agitation  nicotine  Polacrilex Gum 2 milliGRAM(s) Oral every 2 hours PRN NRT      Anthropometrics:  Daily     Daily     IBW:     % IBW    Weight Change:     Nutrition Focused Physical Exam: Completed [   ]  Not Pertinent [   ]  Muscle Wasting- Temporal [   ]  Clavicle/Pectoral [   ]  Shoulder/Deltoid [   ]  Scapula [   ]  Interosseous [   ]  Quadriceps [   ]  Gastrocnemius [   ]  Fat Wasting- Orbital [   ]  Buccal [   ]  Triceps [   ]  Rib [   ]  Suspect [PCM] 2/2 to physical assessment, [poor intake], and [wt loss]; please see malnutrition chart note.    Estimated energy needs:   Calories:   Protein:   Fluid:    Subjective:     Previous Nutrition Diagnosis:    Active [   ]  Resolved [   ]    If resolved, new PES:     Goal:    Recommendations:    Education:     Risk Level: High [   ] Moderate [   ] Low [   ]

## 2022-10-31 NOTE — BH TREATMENT PLAN - NSCMSPTSTRENGTHS_PSY_ALL_CORE
Amanda/spirituality/Intact employment/Intelligence/Resourceful
Amanda/spirituality/Intact employment/Intelligence/Resourceful

## 2022-10-31 NOTE — BH TREATMENT PLAN - NSTXSUICIDINTERMD_PSY_ALL_CORE
Psychopharmacology x15 minutes, will continue to titrate medications (Lithium and Haldol) as appropriate
Psychopharmacology x15 minutes, will continue to titrate medications (Lithium and Haldol) as appropriate

## 2022-10-31 NOTE — PROVIDER CONTACT NOTE (OTHER) - ASSESSMENT
scratch on left wrist ,picking on old wound
Pt endorsing to staff that he is hearing and seeing the voice of his "other half" who is telling him to hurt himself because of all the pain he has caused others. He states he would never hurt others, only himself, and that he doesn't feel his other half will be satisfied until he actually does it. Pt states he has been hearing these voices for weeks, and they started before he went to the sober house. He also endorses that he has been trying to hurt himself all day, using a hairbrush, with visible superficial marks on his left wrist.
patient does not appear to be in distress lying in bed ,comfortable ,feels its also anxiety that is kicking in as well.

## 2022-10-31 NOTE — PROVIDER CONTACT NOTE (OTHER) - SITUATION
patient feels short of breath ,sounds wee zing,vitals bp 141/82 hr 68 ,98% on room air rr 24/min T:97.8
Pt endorsing to staff that he is hearing and seeing the voice of his "other half" who is telling him to hurt himself because of all the pain he has caused others
patient  reporting anxiety ,AH  to hurt him self ,self injury on left wrist with a pen

## 2022-10-31 NOTE — BH TREATMENT PLAN - NSTXSUBMISINTERPR_PSY_ALL_CORE
Pt will be prompted to attend meetings when a speaker is present on the unit
Pt will be prompted to attend meetings when a speaker is present on the unit

## 2022-10-31 NOTE — BH TREATMENT PLAN - NSTXANXINTERPR_PSY_ALL_CORE
PT will be invited and encouraged to attend all offered groups
PT will be invited and encouraged to attend all offered groups

## 2022-10-31 NOTE — PROGRESS NOTE ADULT - ATTENDING COMMENTS
seen in hallway, ambulating, stated he is feeling well, no palpitation, still some pain in the neck area. no chest pain.     Patient is a 46 year-old-male with history of atrial fibrillation (cardioverted in 2013, follows with Florencio Hernandez)- has been in Sinus Rhythm since then,  depression, who presented to the ED with suicidal thoughts.  The patient believed that his depression was related to his sobriety.     - fall with dizziness- ekg obtained is Sinus , not afib. clinical exam- good rate/rhythm. CTH negative, No hypoglycemia.   - Hx of Afib had cardioversion in 2013 and has been in Sinus since then, able to provide cardiologist name Dr. Peter Hernandez has follow up apt on November 10,22- Dr. Rodriges was able to contact card office and confirmed pt on sinus/survillance/no meds required.  -no new EKG- since pt remain stable , to follow up with Dr. Hernandez as out patient.   - if afib noted-please notify us - might need cardiology consult if Afib seen ( would need rate control vs rhythm control (cardioversion)    - Dizziness- could be due to medication, pt on gabapentin 300 tid) /haldol/trazodone prior to fall .-- no dizziness reported 10/31.     - HTN - on amlodipine -to continue for now.    -Thank you for allowing to participate in the care of this patient.. RN

## 2022-10-31 NOTE — BH INPATIENT PSYCHIATRY PROGRESS NOTE - CURRENT MEDICATION
MEDICATIONS  (STANDING):  cyanocobalamin 1000 MICROGram(s) Oral daily  folic acid 1 milliGRAM(s) Oral daily  gabapentin 300 milliGRAM(s) Oral three times a day  influenza   Vaccine 0.5 milliLiter(s) IntraMuscular once  lithium 900 milliGRAM(s) Oral at bedtime  losartan 50 milliGRAM(s) Oral daily  multivitamin 1 Tablet(s) Oral daily  nicotine - 21 mG/24Hr(s) Patch 1 Patch Transdermal daily  pantoprazole    Tablet 40 milliGRAM(s) Oral before breakfast  risperiDONE   Tablet 1 milliGRAM(s) Oral at bedtime  traZODone 50 milliGRAM(s) Oral at bedtime    MEDICATIONS  (PRN):  acetaminophen     Tablet .. 650 milliGRAM(s) Oral every 6 hours PRN Moderate Pain (4 - 6)  aluminum hydroxide/magnesium hydroxide/simethicone Suspension 30 milliLiter(s) Oral every 6 hours PRN Dyspepsia  diphenhydrAMINE 50 milliGRAM(s) Oral every 6 hours PRN agitation  haloperidol     Tablet 5 milliGRAM(s) Oral every 6 hours PRN agitation  LORazepam     Tablet 2 milliGRAM(s) Oral every 6 hours PRN agitation  nicotine  Polacrilex Gum 2 milliGRAM(s) Oral every 2 hours PRN NRT

## 2022-10-31 NOTE — BH TREATMENT PLAN - NSTXSUBMISINTERMD_PSY_ALL_CORE
Psychopharmacology x15 minutes, will continue to titrate medications as appropriate
Psychopharmacology x15 minutes, will continue to titrate medications as appropriate

## 2022-10-31 NOTE — BH TREATMENT PLAN - ANXIETY/PANIC/FEAR NURSING INTERVENTIONS/RECOMMENDATIONS
patient was encouraged to join groups ,verbalize feelings to staff and comply with meds.
patient is encouraged to join groups ,verbalize feelings to staff and comply with meds.

## 2022-10-31 NOTE — BH INPATIENT PSYCHIATRY PROGRESS NOTE - NSBHMETABOLIC_PSY_ALL_CORE_FT
BMI: BMI (kg/m2): 32.1 (10-23-22 @ 22:00)  HbA1c: A1C with Estimated Average Glucose Result: 5.6 % (10-26-22 @ 05:30)    Glucose: POCT Blood Glucose.: 118 mg/dL (10-28-22 @ 13:51)    BP: 130/89 (10-31-22 @ 09:00) (120/76 - 155/88)  Lipid Panel: Date/Time: 10-26-22 @ 05:30  Cholesterol, Serum: 144  Direct LDL: --  HDL Cholesterol, Serum: 33  Total Cholesterol/HDL Ration Measurement: --  Triglycerides, Serum: 113

## 2022-10-31 NOTE — BH TREATMENT PLAN - NSTXANXINTERMD_PSY_ALL_CORE
Psychopharmacology x15 minutes, will continue to titrate medications (Lithium and Haldol) as appropriate

## 2022-11-01 PROCEDURE — 99232 SBSQ HOSP IP/OBS MODERATE 35: CPT | Mod: GC

## 2022-11-01 PROCEDURE — 90832 PSYTX W PT 30 MINUTES: CPT

## 2022-11-01 RX ORDER — RISPERIDONE 4 MG/1
1 TABLET ORAL DAILY
Refills: 0 | Status: DISCONTINUED | OUTPATIENT
Start: 2022-11-01 | End: 2022-11-03

## 2022-11-01 RX ADMIN — PANTOPRAZOLE SODIUM 40 MILLIGRAM(S): 20 TABLET, DELAYED RELEASE ORAL at 06:57

## 2022-11-01 RX ADMIN — GABAPENTIN 300 MILLIGRAM(S): 400 CAPSULE ORAL at 13:00

## 2022-11-01 RX ADMIN — LITHIUM CARBONATE 900 MILLIGRAM(S): 300 TABLET, EXTENDED RELEASE ORAL at 21:21

## 2022-11-01 RX ADMIN — PREGABALIN 1000 MICROGRAM(S): 225 CAPSULE ORAL at 09:29

## 2022-11-01 RX ADMIN — LOSARTAN POTASSIUM 50 MILLIGRAM(S): 100 TABLET, FILM COATED ORAL at 09:30

## 2022-11-01 RX ADMIN — RISPERIDONE 1 MILLIGRAM(S): 4 TABLET ORAL at 13:50

## 2022-11-01 RX ADMIN — Medication 1 TABLET(S): at 09:30

## 2022-11-01 RX ADMIN — Medication 2 MILLIGRAM(S): at 09:31

## 2022-11-01 RX ADMIN — RISPERIDONE 1 MILLIGRAM(S): 4 TABLET ORAL at 21:21

## 2022-11-01 RX ADMIN — GABAPENTIN 300 MILLIGRAM(S): 400 CAPSULE ORAL at 21:21

## 2022-11-01 RX ADMIN — Medication 1 MILLIGRAM(S): at 09:30

## 2022-11-01 RX ADMIN — Medication 50 MILLIGRAM(S): at 15:35

## 2022-11-01 RX ADMIN — Medication 1 PATCH: at 09:29

## 2022-11-01 RX ADMIN — Medication 50 MILLIGRAM(S): at 21:21

## 2022-11-01 RX ADMIN — GABAPENTIN 300 MILLIGRAM(S): 400 CAPSULE ORAL at 09:30

## 2022-11-01 RX ADMIN — Medication 1 PATCH: at 17:10

## 2022-11-01 RX ADMIN — Medication 2 MILLIGRAM(S): at 15:35

## 2022-11-01 NOTE — BH INPATIENT PSYCHIATRY PROGRESS NOTE - PRN MEDS
MEDICATIONS  (PRN):  acetaminophen     Tablet .. 650 milliGRAM(s) Oral every 6 hours PRN Moderate Pain (4 - 6)  aluminum hydroxide/magnesium hydroxide/simethicone Suspension 30 milliLiter(s) Oral every 6 hours PRN Dyspepsia  diphenhydrAMINE 50 milliGRAM(s) Oral every 6 hours PRN agitation  haloperidol     Tablet 5 milliGRAM(s) Oral every 6 hours PRN agitation  LORazepam     Tablet 2 milliGRAM(s) Oral every 6 hours PRN agitation  
MEDICATIONS  (PRN):  acetaminophen     Tablet .. 650 milliGRAM(s) Oral every 6 hours PRN Moderate Pain (4 - 6)  aluminum hydroxide/magnesium hydroxide/simethicone Suspension 30 milliLiter(s) Oral every 6 hours PRN Dyspepsia  diphenhydrAMINE 50 milliGRAM(s) Oral every 6 hours PRN agitation  haloperidol     Tablet 5 milliGRAM(s) Oral every 6 hours PRN agitation  LORazepam     Tablet 2 milliGRAM(s) Oral every 6 hours PRN agitation  nicotine  Polacrilex Gum 2 milliGRAM(s) Oral every 2 hours PRN NRT  
MEDICATIONS  (PRN):  acetaminophen     Tablet .. 650 milliGRAM(s) Oral every 6 hours PRN Moderate Pain (4 - 6)  aluminum hydroxide/magnesium hydroxide/simethicone Suspension 30 milliLiter(s) Oral every 6 hours PRN Dyspepsia  diphenhydrAMINE 50 milliGRAM(s) Oral every 6 hours PRN agitation  haloperidol     Tablet 5 milliGRAM(s) Oral every 6 hours PRN agitation  LORazepam     Tablet 2 milliGRAM(s) Oral every 6 hours PRN agitation  
MEDICATIONS  (PRN):  acetaminophen     Tablet .. 650 milliGRAM(s) Oral every 6 hours PRN Moderate Pain (4 - 6)  aluminum hydroxide/magnesium hydroxide/simethicone Suspension 30 milliLiter(s) Oral every 6 hours PRN Dyspepsia  diphenhydrAMINE 50 milliGRAM(s) Oral every 6 hours PRN agitation  haloperidol     Tablet 5 milliGRAM(s) Oral every 6 hours PRN agitation  LORazepam     Tablet 2 milliGRAM(s) Oral every 6 hours PRN agitation  
MEDICATIONS  (PRN):  acetaminophen     Tablet .. 650 milliGRAM(s) Oral every 6 hours PRN Moderate Pain (4 - 6)  aluminum hydroxide/magnesium hydroxide/simethicone Suspension 30 milliLiter(s) Oral every 6 hours PRN Dyspepsia  diphenhydrAMINE 50 milliGRAM(s) Oral every 6 hours PRN agitation  haloperidol     Tablet 5 milliGRAM(s) Oral every 6 hours PRN agitation  LORazepam     Tablet 2 milliGRAM(s) Oral every 6 hours PRN agitation  nicotine  Polacrilex Gum 2 milliGRAM(s) Oral every 2 hours PRN NRT  
MEDICATIONS  (PRN):  acetaminophen     Tablet .. 650 milliGRAM(s) Oral every 6 hours PRN Moderate Pain (4 - 6)  aluminum hydroxide/magnesium hydroxide/simethicone Suspension 30 milliLiter(s) Oral every 6 hours PRN Dyspepsia  diphenhydrAMINE 50 milliGRAM(s) Oral every 6 hours PRN agitation  haloperidol     Tablet 5 milliGRAM(s) Oral every 6 hours PRN agitation  LORazepam     Tablet 2 milliGRAM(s) Oral every 6 hours PRN agitation  nicotine  Polacrilex Gum 2 milliGRAM(s) Oral every 2 hours PRN NRT  
MEDICATIONS  (PRN):  acetaminophen     Tablet .. 650 milliGRAM(s) Oral every 6 hours PRN Moderate Pain (4 - 6)  aluminum hydroxide/magnesium hydroxide/simethicone Suspension 30 milliLiter(s) Oral every 6 hours PRN Dyspepsia  diphenhydrAMINE 50 milliGRAM(s) Oral every 6 hours PRN agitation  haloperidol     Tablet 5 milliGRAM(s) Oral every 6 hours PRN agitation  ibuprofen  Tablet. 400 milliGRAM(s) Oral every 8 hours PRN Temp greater or equal to 38C (100.4F), Mild Pain (1 - 3)  LORazepam     Tablet 2 milliGRAM(s) Oral every 6 hours PRN agitation  nicotine  Polacrilex Gum 2 milliGRAM(s) Oral every 2 hours PRN NRT

## 2022-11-01 NOTE — BH PSYCHOLOGY - CLINICIAN PSYCHOTHERAPY NOTE - TOKEN PULL-DIAGNOSIS
Primary Diagnosis:  Bipolar disorder [F31.9]        Problem Dx:   MDD (major depressive disorder) [F32.9]      Severe cocaine use disorder, in early remission [F14.21]      Severe alcohol use disorder, in early remission [F10.21]      
Primary Diagnosis:  Bipolar disorder with psychotic features [F31.9]      Bipolar disorder [F31.9]        Problem Dx:   Depressed bipolar I disorder [F31.9]      MDD (major depressive disorder) [F32.9]      Severe cocaine use disorder, in early remission [F14.21]      Severe alcohol use disorder, in early remission [F10.21]      
Primary Diagnosis:  Bipolar disorder with psychotic features [F31.9]      Bipolar disorder [F31.9]        Problem Dx:   Depressed bipolar I disorder [F31.9]      MDD (major depressive disorder) [F32.9]      Severe cocaine use disorder, in early remission [F14.21]      Severe alcohol use disorder, in early remission [F10.21]

## 2022-11-01 NOTE — PROGRESS NOTE ADULT - ATTENDING COMMENTS
reported throat pain , some wheezing yesterday resolved with nebs yesterday, chest x ray negative, labs reviewed   ambulating, no more wheezing, some throat pain -stuffy nose, likely post nasal drip. smoker, now on nicotine patch,     Patient is a 46 year-old-male with history of atrial fibrillation (cardioverted in 2013, follows with Florencio Hernandez)- has been in Sinus Rhythm since then,  depression, smoker,  who presented to the ED with suicidal thoughts.  The patient believed that his depression was related to his sobriety.     - fall with dizziness- ekg obtained is Sinus , not afib. clinical exam- good rate/rhythm. CTH negative, No hypoglycemia prior to fall, now resolved.    - Hx of Afib had cardioversion in 2013 and has been in Sinus since then, able to provide cardiologist name Dr. Peter Hernandez has follow up apt on November 10,22- Dr. Rodriges was able to contact card office and confirmed pt on sinus/survillance/no meds required.  -no new EKG- since pt remain stable , to follow up with Dr. Hernandez as out patient.   - if afib noted-please notify us - might need cardiology consult if Afib seen ( would need rate control vs rhythm control (cardioversion)    - Dizziness- could be due to medication, pt on gabapentin 300 tid) /haldol/trazodone prior to fall .-- no dizziness reported 10/31.     - smoker - on nicotine patch.    - HTN - on amlodipine -to continue for now.    - post nasal drip -can give fluticasone nasal spray bid, loratidine 10 mg po q daily for one week.  -Thank you for allowing to participate in the care of this patient., med consult will follow. elizabeth Wall.

## 2022-11-01 NOTE — BH INPATIENT PSYCHIATRY PROGRESS NOTE - NSBHMSEPERCEPT_PSY_A_CORE
Auditory hallucinations/Other
Auditory hallucinations/Other
No abnormalities
Auditory hallucinations/Other

## 2022-11-01 NOTE — BH INPATIENT PSYCHIATRY PROGRESS NOTE - CURRENT MEDICATION
MEDICATIONS  (STANDING):  cyanocobalamin 1000 MICROGram(s) Oral daily  folic acid 1 milliGRAM(s) Oral daily  gabapentin 300 milliGRAM(s) Oral three times a day  influenza   Vaccine 0.5 milliLiter(s) IntraMuscular once  lithium 900 milliGRAM(s) Oral at bedtime  losartan 50 milliGRAM(s) Oral daily  multivitamin 1 Tablet(s) Oral daily  nicotine - 21 mG/24Hr(s) Patch 1 Patch Transdermal daily  pantoprazole    Tablet 40 milliGRAM(s) Oral before breakfast  risperiDONE   Tablet 1 milliGRAM(s) Oral daily  risperiDONE   Tablet 1 milliGRAM(s) Oral at bedtime  traZODone 50 milliGRAM(s) Oral at bedtime    MEDICATIONS  (PRN):  acetaminophen     Tablet .. 650 milliGRAM(s) Oral every 6 hours PRN Moderate Pain (4 - 6)  aluminum hydroxide/magnesium hydroxide/simethicone Suspension 30 milliLiter(s) Oral every 6 hours PRN Dyspepsia  diphenhydrAMINE 50 milliGRAM(s) Oral every 6 hours PRN agitation  haloperidol     Tablet 5 milliGRAM(s) Oral every 6 hours PRN agitation  ibuprofen  Tablet. 400 milliGRAM(s) Oral every 8 hours PRN Temp greater or equal to 38C (100.4F), Mild Pain (1 - 3)  LORazepam     Tablet 2 milliGRAM(s) Oral every 6 hours PRN agitation  nicotine  Polacrilex Gum 2 milliGRAM(s) Oral every 2 hours PRN NRT

## 2022-11-01 NOTE — BH PSYCHOLOGY - CLINICIAN PSYCHOTHERAPY NOTE - NSBHPSYCHOLNARRATIVE_PSY_A_CORE FT
APPEARANCE:  [x] adequately groomed []disheveled  [] malodorous [] Other:     BEHAVIOR: [x] cooperative [] uncooperative []x good EC [] poor EC [x] well related [] oddly related [] guarded []PMA [] PMR []abnormal movements [] Other: _____________     SPEED: [x] normal rate/rhythm/volume [] loud [] quiet [] slow  [] rapid [] pressured [] Other: _________   MOOD: [] euthymic [] dysphoric [x]anxious [] irritable [] Other: ___________   AFFECT: [x] full [] expansive [] constricted [] blunted [] flat [] stable [] labile [] Other: _________________ THOUGHT PROCESS: [] organized [] disorganized [] goal-directed [] concrete [] logical  [] illogical   [] circumstantial [] tangential [] impoverished [] effusive [] repetitive [] Other: ___________   THOUGHT CONTENT: [] negative for delusions/suicidal ideation /homicidal ideation  [x] positive for delusions/suicidal ideation/homicidal ideation Describe: reported that he continues to have baseline suicidal ideation but does not intend to act on it.   PERCEPTION: [x] negative for auditory/ visual hallucinations  [] positive for auditory/ visual hallucinations Describe: ____________________________________________________________   INSIGHT/JUDGMENT: [] good [x]fair [] poor        IMPULSE CONTROL: [] good [x]fair [] poor         COGNITION: [x] alert and oriented to person,time,place    Lacks orientation to person/ time/ place. Describe: _______________________    Met with Mr. Zarate for individual psychotherapy to complete CAMS assessment. He reported that he feels anxious and also has been having some suicidal ideation, as he feels that he is bored on the unit, not being able to do much, and his mind goes to thoughts of suicide, although he does not intend to act on that. His psychological pain and stress which are high are about being in the hospital, while agitation is about being bored. Hopelessness is about getting better and also is the self hate. He reported that his overall risk of suicide is 3, and his wish to live is 6 while wish to die is 5. He identified his family, wife, kids and friends as reasons for living, and reason for dying is to end the pain, which he identifies as the anxiety and pain. He reported that at the same time, he got what he needed from the hospital, he had relapsed, and was feeling unsafe with his family not wanting to talk to him, but he feels that he has been motivated to go to AA meetings and get back to his work, which he gets significant fulfillment from. He reported that when he takes his medications for Bipolar disorder, he is able to think straight, and right now, given that his medications have been stabilized, he feels ready to get back to work and be productive. He reported that he also has a sponsor and feels that AA sponsor has been helpful with his sobriety. Denied any intent to die. Reported that he has thoughts of suicide usually a majority time of the day, and his way of coping is to keep himself busy with work and family, and feels that he cant access his coping skills while on the unit.  Risk assessment:  Static: Prior suicide attempts, Bipolar disorder, substance use disorder  Modifiable: Current increase in suicidal ideation, medication non compliance  Protective: feels responsible to his family, future oriented, seeks help, has work that he feels engaged in.

## 2022-11-01 NOTE — BH PSYCHOLOGY - CLINICIAN PSYCHOTHERAPY NOTE - NSTXSUBMISGOAL_PSY_ALL_CORE
Accept referral for substance abuse treatment on discharge

## 2022-11-01 NOTE — BH PSYCHOLOGY - CLINICIAN PSYCHOTHERAPY NOTE - NSBHPSYCHOLGOALS_PSY_A_CORE
Prevent relapse
Decrease symptoms/Assessment/Psychoeducation
Improve social/vocational/coping skills

## 2022-11-01 NOTE — BH INPATIENT PSYCHIATRY PROGRESS NOTE - NSBHFUPINTERVALHXFT_PSY_A_CORE
Patient visible on the unit, seen walking around the unit and appropriately interacting with peers and staff. States that this weekend was a 'bad' because he was bored and was playing with wrists with numerous pen caps that he found on the unit. Denies suicidal intent. Is anticipating changing haldol to risperdal. Discharge focused and requests to be discharged by the end of the week. Fair sleep and appetite.   Denies si/hi. States that he last had avh yesterday. no acute medical concerns. 
Patient visible on the unit, seen attending groups, talking on the phone and interacting appropriately with select staff. States that he feels better and wonders if Monday discharge would be good for him, but feels that he may be trying to leave prematurely. Is agreeable with Tuesday/Wednesday discharge. States that overall he is tolerating the med regimen well. No acute medical concerns. No si/hi or PI and shows his wrist to indicate that he has not been trying to harm himself. He states that yesterday he had an episode where he experienced avh telling him that he needs to suffer and demanding blood, however he did not feel inclined to harm himself. He states that his mood swings are still present but less so than before.  Will continue with med titration. 
Patient visible on the unit, anxious to speak to writer on approach, states that he submitted a 72 hour letter last night, is extremely bored with his admission and wants to be discharged immediately. We discussed his intermitted avh as well as mood lability and he is agreeable to discharge on Thursday following medication adjustments. Attending select groups, has full range affect, appropriately interacting with peers, noted to become extremely upset when he was not allowed in a groups later in the afternoon due to it being at capacity. Later endorsing avh. Receiving risperdal 1mg with good effect. Denies having any medical complaints today and simply stated that last night he got blood work done because he had a sore throat
Patient visible on the unit observed using the phone and later interacting with peers. Is pleasant on approach, extends his arms to show that he had a very mild almost unobservable hand tremor. He relates this to his medication regimen. Denies any other side effects or adverse reactions. He also points to his wrists and states that he hasn't felt the need to scratch at the spot. He reports feeling better today and states 'I got rid of him' referring to his vh. He states that he has not had any avh since yesterday. Slept well last night and is feeling very optimistic. He is gracious about his hospitalization sharing many positives of his experience. He has been in contact with his family and close friends. He also states that he was in contact with his PCP who informed him that his vitb12 was low and that he should be taking 1000mcg qd. We also agreed to add multivitamin and folic acid to regimen. He states that he felt that he slept too much yesterday and has concerns about taking his psychiatric medications during the day. Is agreeable to qhs dosing of both lithium and haldol. No si/hi/avh or PI endorsed
Patient visible on the unit, states that he is adjusting well to the unit and is happy that he is in the hospital to get help. Did take first dose of Lithium last night, no reported s/e. Medication education discussed. No acute medical concerns. No reported si, but he states that he 'plays' with his wrist at times, usually poking it or pressing a fingernail into it to feel better. He has been in contact with his wife and sister during admission and they are happy he is hospitalized. He speaks at length about wanting therapy to discuss events that happened in his childhood. Continues to deny hi/avh.    On later approach he states that he was not forthcoming with treatment team and reveals that he has been having avh x1 month. He is apologetic and he states that he has been seeing a 'good' version of himself that tells him things and subsequently he plays with his wrist to snap himself out of it. He is encouraged to take prn haldol and is aware of plan to add antipsychotic to regimen to address these symptoms. 
Patient visible on the unit, again states that he was not forthcoming with initial assessment  and had not been truthful about avh. He did receive po prns of haldol 5mg/ativan 2mg with good effect last night and states that he slept heavily afterwards and missed his evening Lithium dose. He is agreeable with continuation of haldol as needed. He reports having fair mood overall and is happy with being on the unit. No acute medical concerns.  Head CT to be ordered for new onset of avh x1 month
discussed in detail patient accepting higher dose of Lithium with consideration of dc by 11/4 after level ascertained.  Patient in agreement;  anxious but Not endorsing  suicidal or homicidal ideation intent or plans; no mention of auditory or visual hallucinations Alert; oriented; cognition intact; speech clear; no tremor or evidence of movement impairment.  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

## 2022-11-01 NOTE — BH PSYCHOLOGY - CLINICIAN PSYCHOTHERAPY NOTE - NSBHPSYCHOLADDL_PSY_A_CORE
Mr. Zarate is a 46 year old male with 30 year history of alcohol and cocaine use , who is  but currently domiciled at sober living house. He was brought in following increased suicidal ideation, superficially cutting wrists, and experiencing hallucinations.

## 2022-11-01 NOTE — BH INPATIENT PSYCHIATRY PROGRESS NOTE - NSBHCHARTREVIEWINVESTIGATE_PSY_A_CORE FT
ACC: [id number] EXAM:  CT BRAIN                          PROCEDURE DATE:  10/26/2022          INTERPRETATION:  PROCEDURE: CT head without intravenous contrast    CLINICAL INDICATION: New onset hallucinations    TECHNIQUE: Axial CT imaging of the brain is obtained with multiplanar   images reviewed and displayed with both bone and soft tissue algorithm.    COMPARISON: None    FINDINGS:    Ventricles, cisterns and sulci are unremarkable. No hydrocephalus, mass   effect or midline shift.    No acute intracranial hemorrhage or extra-axial fluid collection.    Gray to white differentiation appears preserved, without CT evidence of   recent transcortical or territorial infarct.    Bone algorithm images show no calvarial fracture or acute abnormality of   the calvarium or skull base. Paranasal sinuses and mastoids included on   this scan show no acute disease.      IMPRESSION:  Unremarkable noncontrast CT of the brain.    --- End of Report ---  
ACC: [id number] EXAM:  CT BRAIN                          PROCEDURE DATE:  10/26/2022          INTERPRETATION:  PROCEDURE: CT head without intravenous contrast    CLINICAL INDICATION: New onset hallucinations    TECHNIQUE: Axial CT imaging of the brain is obtained with multiplanar   images reviewed and displayed with both bone and soft tissue algorithm.    COMPARISON: None    FINDINGS:    Ventricles, cisterns and sulci are unremarkable. No hydrocephalus, mass   effect or midline shift.    No acute intracranial hemorrhage or extra-axial fluid collection.    Gray to white differentiation appears preserved, without CT evidence of   recent transcortical or territorial infarct.    Bone algorithm images show no calvarial fracture or acute abnormality of   the calvarium or skull base. Paranasal sinuses and mastoids included on   this scan show no acute disease.      IMPRESSION:  Unremarkable noncontrast CT of the brain.    --- End of Report ---  
ACC: [id number] EXAM:  CT BRAIN                          PROCEDURE DATE:  10/26/2022          INTERPRETATION:  PROCEDURE: CT head without intravenous contrast    CLINICAL INDICATION: New onset hallucinations    TECHNIQUE: Axial CT imaging of the brain is obtained with multiplanar   images reviewed and displayed with both bone and soft tissue algorithm.    COMPARISON: None    FINDINGS:    Ventricles, cisterns and sulci are unremarkable. No hydrocephalus, mass   effect or midline shift.    No acute intracranial hemorrhage or extra-axial fluid collection.    Gray to white differentiation appears preserved, without CT evidence of   recent transcortical or territorial infarct.    Bone algorithm images show no calvarial fracture or acute abnormality of   the calvarium or skull base. Paranasal sinuses and mastoids included on   this scan show no acute disease.      IMPRESSION:  Unremarkable noncontrast CT of the brain.    --- End of Report ---  
ACC: [id number] EXAM:  CT BRAIN                          PROCEDURE DATE:  10/26/2022          INTERPRETATION:  PROCEDURE: CT head without intravenous contrast    CLINICAL INDICATION: New onset hallucinations    TECHNIQUE: Axial CT imaging of the brain is obtained with multiplanar   images reviewed and displayed with both bone and soft tissue algorithm.    COMPARISON: None    FINDINGS:    Ventricles, cisterns and sulci are unremarkable. No hydrocephalus, mass   effect or midline shift.    No acute intracranial hemorrhage or extra-axial fluid collection.    Gray to white differentiation appears preserved, without CT evidence of   recent transcortical or territorial infarct.    Bone algorithm images show no calvarial fracture or acute abnormality of   the calvarium or skull base. Paranasal sinuses and mastoids included on   this scan show no acute disease.      IMPRESSION:  Unremarkable noncontrast CT of the brain.    --- End of Report ---  
ACC: [id number] EXAM:  CT BRAIN                          PROCEDURE DATE:  10/26/2022          INTERPRETATION:  PROCEDURE: CT head without intravenous contrast    CLINICAL INDICATION: New onset hallucinations    TECHNIQUE: Axial CT imaging of the brain is obtained with multiplanar   images reviewed and displayed with both bone and soft tissue algorithm.    COMPARISON: None    FINDINGS:    Ventricles, cisterns and sulci are unremarkable. No hydrocephalus, mass   effect or midline shift.    No acute intracranial hemorrhage or extra-axial fluid collection.    Gray to white differentiation appears preserved, without CT evidence of   recent transcortical or territorial infarct.    Bone algorithm images show no calvarial fracture or acute abnormality of   the calvarium or skull base. Paranasal sinuses and mastoids included on   this scan show no acute disease.      IMPRESSION:  Unremarkable noncontrast CT of the brain.    --- End of Report ---  
ACC: [id number] EXAM:  CT BRAIN                          PROCEDURE DATE:  10/26/2022          INTERPRETATION:  PROCEDURE: CT head without intravenous contrast    CLINICAL INDICATION: New onset hallucinations    TECHNIQUE: Axial CT imaging of the brain is obtained with multiplanar   images reviewed and displayed with both bone and soft tissue algorithm.    COMPARISON: None    FINDINGS:    Ventricles, cisterns and sulci are unremarkable. No hydrocephalus, mass   effect or midline shift.    No acute intracranial hemorrhage or extra-axial fluid collection.    Gray to white differentiation appears preserved, without CT evidence of   recent transcortical or territorial infarct.    Bone algorithm images show no calvarial fracture or acute abnormality of   the calvarium or skull base. Paranasal sinuses and mastoids included on   this scan show no acute disease.      IMPRESSION:  Unremarkable noncontrast CT of the brain.    --- End of Report ---

## 2022-11-01 NOTE — BH INPATIENT PSYCHIATRY PROGRESS NOTE - NSICDXBHSECONDARYDX_PSY_ALL_CORE
Severe alcohol use disorder, in early remission   F10.21  Severe cocaine use disorder, in early remission   F14.21  MDD (major depressive disorder)   F32.9  
Severe alcohol use disorder, in early remission   F10.21  Severe cocaine use disorder, in early remission   F14.21  MDD (major depressive disorder)   F32.9  Depressed bipolar I disorder   F31.9  
Severe alcohol use disorder, in early remission   F10.21  Severe cocaine use disorder, in early remission   F14.21  MDD (major depressive disorder)   F32.9  Depressed bipolar I disorder   F31.9  
Severe alcohol use disorder, in early remission   F10.21  Severe cocaine use disorder, in early remission   F14.21  MDD (major depressive disorder)   F32.9

## 2022-11-01 NOTE — BH INPATIENT PSYCHIATRY PROGRESS NOTE - NSICDXBHPRIMARYDX_PSY_ALL_CORE
Bipolar disorder with psychotic features   F31.9  

## 2022-11-01 NOTE — BH INPATIENT PSYCHIATRY ASSESSMENT NOTE - NSBHMSEPERCEPT_PSY_A_CORE
CC:  Ananda Tracey is here today for CPE.      Medications: medications verified and updated  Refills needed today?  YES  denies Latex allergy or sensitivity  Tobacco history: verified  Health Maintenance Due   Topic Date Due   • Pneumococcal Vaccine 0-64 (1 - PCV) Never done   • Depression Screening  Never done   • COVID-19 Vaccine (3 - Booster for Moderna series) 06/19/2021   • Influenza Vaccine (1) 09/01/2022     Patient is due for the topics as listed above and wishes to discuss with the provider.   Patient would like communication of messages and results via:        Cell Phone:   Telephone Information:   Mobile [phone]     Okay to leave a message containing results? Yes     Has [employer] form been filled out?  Yes     Has the patient completed Advanced Directives? No     No abnormalities

## 2022-11-01 NOTE — BH INPATIENT PSYCHIATRY PROGRESS NOTE - NSBHMSETHTCONTENT_PSY_A_CORE
Preoccupations
Preoccupations/Ruminations
Preoccupations/Ruminations
Preoccupations
Preoccupations

## 2022-11-01 NOTE — BH PSYCHOLOGY - CLINICIAN PSYCHOTHERAPY NOTE - NSTXANXGOAL_PSY_ALL_CORE
Be able to perform ADLs and maintain safety despite anxiety/panic daily

## 2022-11-01 NOTE — BH INPATIENT PSYCHIATRY PROGRESS NOTE - NSBHASSESSSUMMFT_PSY_ALL_CORE
This is a 45 yo  male, domiciled in sober house x50 days,  with 3 children (ages 15, 14, 3, 4y/o lives at home with current wife, older two live with his ex wife), works as a  at [employer] in [employer] de[employer]. Medical history significant for atrial fibrillation, HTN and GERD. Psychiatric history of bipolar disorder, history of psychiatric hospitalizations, most recent in 2013 following suicide attempt by cutting his wrists. Hx of numerous detox/ rehabs (alcohol/cocaine), currently in sober house x50 days. In treatment recently with Psychiatrist and therapist at [facility] Psychiatry being treated with prozac. No legal history, no trauma history. Patient presents to [facility] accompanied by a friend with c/o of SI with plan to slit his wrists. Transferred to [facility] 8Uris voluntary status.   Patient endorses labile mood with much irritability and persistent thoughts of self harming by cutting wrists.    Lithium 450mg qhs to be titrated as appropriate  Haldol 5mg qhs psychosis  Gabapentin 300mg tid mood/etoh dependence  Trazodone 50mg qhs Insomnia  Protonix 40mg QD GERD  Losartan 50mg qd, Multivitamin qd, Folic acid 1mg qd, cyanocobalamin 1000mcg qd    10/26 head CT unremarkable, pt compliant with medication regimen, endorsing persistent avh telling him that he needs to suffer  1027 Patient reports that he has not had avh since yesterday, feels that mood is beginning to stabilize, denies si/hi/avh or PI. Fair sleep/appetite, attending groups  
This is a 47 yo  male, domiciled in sober house x50 days,  with 3 children (ages 15, 14, 3, 4y/o lives at home with current wife, older two live with his ex wife), works as a  at [employer] in [employer] de[employer]. Medical history significant for atrial fibrillation, HTN and GERD. Psychiatric history of bipolar disorder, history of psychiatric hospitalizations, most recent in 2013 following suicide attempt by cutting his wrists. Hx of numerous detox/ rehabs (alcohol/cocaine), currently in sober house x50 days. In treatment recently with Psychiatrist and therapist at [facility] Psychiatry being treated with prozac. No legal history, no trauma history. Patient presents to ED accompanied by a friend with c/o of SI with plan to slit his wrists. Transferred to [facility] voluntary status.   
This is a 47 yo  male, domiciled in sober house x50 days,  with 3 children (ages 15, 14, 3, 4y/o lives at home with current wife, older two live with his ex wife), works as a  at [email or web address] in [email or web address]. Medical history significant for atrial fibrillation, HTN and GERD. Psychiatric history of bipolar disorder, history of psychiatric hospitalizations, most recent in 2013 following suicide attempt by cutting his wrists. Hx of numerous detox/ rehabs (alcohol/cocaine), currently in sober house x50 days. In treatment recently with Psychiatrist and therapist at [facility] Psychiatry being treated with prozac. No legal history, no trauma history. Patient presents to [facility] accompanied by a friend with c/o of SI with plan to slit his wrists. Transferred to [facility] voluntary status.   Patient endorses labile mood with much irritability and persistent thoughts of self harming by cutting wrists.    Lithium 600mg qhs   Haldol 5mg qhs psychosis  Gabapentin 300mg tid mood/etoh dependence  Trazodone 50mg qhs Insomnia  Protonix 40mg QD GERD  Losartan 50mg qd, Multivitamin qd, Folic acid 1mg qd, cyanocobalamin 1000mcg qd    10/26 head CT unremarkable, pt compliant with medication regimen, endorsing persistent avh telling him that he needs to suffer  1027 Patient reports that he has not had avh since yesterday, feels that mood is beginning to stabilize, denies si/hi/avh or PI. Fair sleep/appetite, attending groups  
This is a 45 yo  male, domiciled in sober house x50 days,  with 3 children (ages 15, 14, 3, 2y/o lives at home with current wife, older two live with his ex wife), works as a  at [employer] in [place] dept. Medical history significant for atrial fibrillation, HTN and GERD. Psychiatric history of bipolar disorder, history of psychiatric hospitalizations, most recent in 2013 following suicide attempt by cutting his wrists. Hx of numerous detox/ rehabs (alcohol/cocaine), currently in sober house x50 days. In treatment recently with Psychiatrist and therapist at [facility] Psychiatry being treated with prozac. No legal history, no trauma history. Patient presents to [facility] accompanied by a friend with c/o of SI with plan to slit his wrists. Transferred to [facility] 8[facility]ris voluntary status.   Patient endorses labile mood with much irritability and persistent thoughts of self harming by cutting wrists.    Lithium 900mg qhs   Risperdal 1mg qhs psychosis  Gabapentin 300mg tid mood/etoh dependence  Trazodone 50mg qhs Insomnia  Protonix 40mg QD GERD  Losartan 50mg qd, Multivitamin qd, Folic acid 1mg qd, cyanocobalamin 1000mcg qd    10/26 head CT unremarkable, pt compliant with medication regimen, endorsing persistent avh telling him that he needs to suffer  1027 Patient reports that he has not had avh since yesterday, feels that mood is beginning to stabilize, denies si/hi/avh or PI. Fair sleep/appetite, attending groups  ;;10/30: raising Lothium to 900mg at night; patient willing to stay until 11/4 for mood stabilization and Lithium level.  Not endorsing  suicidal or homicidal ideation intent or plans; no mention of auditory or visual hallucinations Alert; oriented; cognition intact; speech clear; no tremor or evidence of movement impairment.  anxious but Thinking is congruent with affect; no peculiarities of thinking or language use.  Focused on discharge; wants to leave soon.   10/31 Patient continuing to endorse avh, discharge focused  
This is a 47 yo  male, domiciled in sober house x50 days,  with 3 children (ages 15, 14, 3, 2y/o lives at home with current wife, older two live with his ex wife), works as a  at [employer] in [employer] de[employer]. Medical history significant for atrial fibrillation, HTN and GERD. Psychiatric history of bipolar disorder, history of psychiatric hospitalizations, most recent in 2013 following suicide attempt by cutting his wrists. Hx of numerous detox/ rehabs (alcohol/cocaine), currently in sober house x50 days. In treatment recently with Psychiatrist and therapist at [facility] Psychiatry being treated with prozac. No legal history, no trauma history. Patient presents to ED accompanied by a friend with c/o of SI with plan to slit his wrists. Transferred to [facility] voluntary status.   Patient endorses labile mood with much irritability and persistent thoughts of self harming by cutting wrists.    Lithium 300mg qhs initiated, to be titrated as appropriate  Haldol 5mg q6h prn for psychosis/agitation    10/26 head CT unremarkable, pt compliant with medication regimen, endorsing persistent avh telling him that he needs to suffer  
This is a 45 yo  male, domiciled in sober house x50 days,  with 3 children (ages 15, 14, 3, 4y/o lives at home with current wife, older two live with his ex wife), works as a  at [employer] in [employer] de[employer]. Medical history significant for atrial fibrillation, HTN and GERD. Psychiatric history of bipolar disorder, history of psychiatric hospitalizations, most recent in 2013 following suicide attempt by cutting his wrists. Hx of numerous detox/ rehabs (alcohol/cocaine), currently in sober house x50 days. In treatment recently with Psychiatrist and therapist at [facility] Psychiatry being treated with prozac. No legal history, no trauma history. Patient presents to [facility] accompanied by a friend with c/o of SI with plan to slit his wrists. Transferred to [facility] voluntary status.   Patient endorses labile mood with much irritability and persistent thoughts of self harming by cutting wrists.    Lithium 900mg qhs   Risperdal 1mg qhs psychosis  Gabapentin 300mg tid mood/etoh dependence  Trazodone 50mg qhs Insomnia  Protonix 40mg QD GERD  Losartan 50mg qd, Multivitamin qd, Folic acid 1mg qd, cyanocobalamin 1000mcg qd    10/26 head CT unremarkable, pt compliant with medication regimen, endorsing persistent avh telling him that he needs to suffer  1027 Patient reports that he has not had avh since yesterday, feels that mood is beginning to stabilize, denies si/hi/avh or PI. Fair sleep/appetite, attending groups  ;;10/30: raising Lothium to 900mg at night; patient willing to stay until 11/4 for mood stabilization and Lithium level.  Not endorsing  suicidal or homicidal ideation intent or plans; no mention of auditory or visual hallucinations Alert; oriented; cognition intact; speech clear; no tremor or evidence of movement impairment.  anxious but Thinking is congruent with affect; no peculiarities of thinking or language use.  Focused on discharge; wants to leave soon.   10/31 Patient continuing to endorse avh, discharge focused  
This is a 47 yo  male, domiciled in sober house x50 days,  with 3 children (ages 15, 14, 3, 4y/o lives at home with current wife, older two live with his ex wife), works as a  at [employer] in [employer] de[employer]. Medical history significant for atrial fibrillation, HTN and GERD. Psychiatric history of bipolar disorder, history of psychiatric hospitalizations, most recent in 2013 following suicide attempt by cutting his wrists. Hx of numerous detox/ rehabs (alcohol/cocaine), currently in sober house x50 days. In treatment recently with Psychiatrist and therapist at [facility] Psychiatry being treated with prozac. No legal history, no trauma history. Patient presents to [facility] accompanied by a friend with c/o of SI with plan to slit his wrists. Transferred to [facility]ris voluntary status.   Patient endorses labile mood with much irritability and persistent thoughts of self harming by cutting wrists.    Lithium 600mg qhs   Haldol 5mg qhs psychosis  Gabapentin 300mg tid mood/etoh dependence  Trazodone 50mg qhs Insomnia  Protonix 40mg QD GERD  Losartan 50mg qd, Multivitamin qd, Folic acid 1mg qd, cyanocobalamin 1000mcg qd    10/26 head CT unremarkable, pt compliant with medication regimen, endorsing persistent avh telling him that he needs to suffer  1027 Patient reports that he has not had avh since yesterday, feels that mood is beginning to stabilize, denies si/hi/avh or PI. Fair sleep/appetite, attending groups  ;;10/30: raising Lothium to 900mg at night; patient willing to stay until 11/4 for mood stabilization and Lithium level.  Not endorsing  suicidal or homicidal ideation intent or plans; no mention of auditory or visual hallucinations Alert; oriented; cognition intact; speech clear; no tremor or evidence of movement impairment.  anxious but Thinking is congruent with affect; no peculiarities of thinking or language use.  Focused on discharge; wants to leave soon.

## 2022-11-01 NOTE — BH INPATIENT PSYCHIATRY PROGRESS NOTE - NSDCCRITERIA_PSY_ALL_CORE
Improvement in symptoms and decrease in si

## 2022-11-01 NOTE — BH INPATIENT PSYCHIATRY PROGRESS NOTE - OTHER
voices stating that she should suffer

## 2022-11-01 NOTE — BH INPATIENT PSYCHIATRY PROGRESS NOTE - NSBHATTESTTYPEVISIT_PSY_A_CORE
On-site Attending supervising TARIQ (99XXX codes)
Bilateral foot pain
On-site Attending supervising TARIQ (99XXX codes)
Attending Only
On-site Attending supervising TARIQ (99XXX codes)

## 2022-11-01 NOTE — BH INPATIENT PSYCHIATRY PROGRESS NOTE - NSBHFUPINTERVALCCFT_PSY_A_CORE
'I feel better today'
treated for mood issues.  Focused on discharge; wants to leave. 
'I play with my wrist sometimes'
treated for mood issues.  Focused on discharge; wants to leave. 
'I got rid of him'
'I play with my wrist sometimes'
treated for mood issues.  Focused on discharge; wants to leave.

## 2022-11-01 NOTE — BH PSYCHOLOGY - CLINICIAN PSYCHOTHERAPY NOTE - NSBHPSYCHOLPROBS_PSY_ALL_CORE
Suicidality
Aggression/Anger/Irritability/Depression/Family Dysfunction/Impulsivity/Substance Abuse
Substance Abuse/Suicidality

## 2022-11-01 NOTE — BH INPATIENT PSYCHIATRY PROGRESS NOTE - NSBHATTESTBILLINGAW_PSY_A_CORE
99223-Initial hospital care - high complexity
[id number]-Zuorcsnhcj Inpatient care - high complexity - 35 minutes
[id number]-Vgzlzvxene Inpatient care - high complexity - 35 minutes
[id number]-Uiscolomow Inpatient care - high complexity - 35 minutes
[id number]-Npkctcetwu Inpatient care - moderate complexity - 25 minutes
[id number]-Djhdizixnj Inpatient care - high complexity - 35 minutes
[id number]-Sterwlqgos Inpatient care - high complexity - 35 minutes

## 2022-11-01 NOTE — BH INPATIENT PSYCHIATRY PROGRESS NOTE - NSBHMETABOLIC_PSY_ALL_CORE_FT
BMI: BMI (kg/m2): 32.1 (10-23-22 @ 22:00)  HbA1c: A1C with Estimated Average Glucose Result: 5.6 % (10-26-22 @ 05:30)    Glucose: POCT Blood Glucose.: 118 mg/dL (10-28-22 @ 13:51)    BP: 131/83 (10-31-22 @ 16:31) (125/91 - 131/83)  Lipid Panel: Date/Time: 10-26-22 @ 05:30  Cholesterol, Serum: 144  Direct LDL: --  HDL Cholesterol, Serum: 33  Total Cholesterol/HDL Ration Measurement: --  Triglycerides, Serum: 113

## 2022-11-01 NOTE — BH INPATIENT PSYCHIATRY PROGRESS NOTE - NSTXSUBMISGOAL_PSY_ALL_CORE
Accept referral for substance abuse treatment on discharge

## 2022-11-01 NOTE — BH INPATIENT PSYCHIATRY PROGRESS NOTE - NSTXSUBMISINTERMD_PSY_ALL_CORE
Psychopharmacology x15 minutes, will continue to titrate medications as appropriate

## 2022-11-02 LAB — SARS-COV-2 RNA SPEC QL NAA+PROBE: SIGNIFICANT CHANGE UP

## 2022-11-02 PROCEDURE — 99231 SBSQ HOSP IP/OBS SF/LOW 25: CPT | Mod: GC

## 2022-11-02 RX ORDER — NICOTINE POLACRILEX 2 MG
1 GUM BUCCAL
Qty: 0 | Refills: 0 | DISCHARGE
Start: 2022-11-02 | End: 2022-12-07

## 2022-11-02 RX ORDER — FLUTICASONE PROPIONATE 50 MCG
1 SPRAY, SUSPENSION NASAL
Qty: 0 | Refills: 0 | DISCHARGE
Start: 2022-11-02

## 2022-11-02 RX ORDER — FLUTICASONE PROPIONATE 50 MCG
1 SPRAY, SUSPENSION NASAL
Refills: 0 | Status: DISCONTINUED | OUTPATIENT
Start: 2022-11-02 | End: 2022-11-03

## 2022-11-02 RX ORDER — FOLIC ACID 0.8 MG
1 TABLET ORAL
Qty: 0 | Refills: 0 | DISCHARGE
Start: 2022-11-02

## 2022-11-02 RX ORDER — LITHIUM CARBONATE 300 MG/1
3 TABLET, EXTENDED RELEASE ORAL
Qty: 0 | Refills: 0 | DISCHARGE
Start: 2022-11-02

## 2022-11-02 RX ORDER — OMEPRAZOLE 10 MG/1
1 CAPSULE, DELAYED RELEASE ORAL
Qty: 0 | Refills: 0 | DISCHARGE

## 2022-11-02 RX ORDER — PANTOPRAZOLE SODIUM 20 MG/1
1 TABLET, DELAYED RELEASE ORAL
Qty: 30 | Refills: 0
Start: 2022-11-02 | End: 2022-12-01

## 2022-11-02 RX ORDER — ALBUTEROL 90 UG/1
2 AEROSOL, METERED ORAL
Qty: 1 | Refills: 0
Start: 2022-11-02 | End: 2022-12-01

## 2022-11-02 RX ORDER — FOLIC ACID 0.8 MG
1 TABLET ORAL
Qty: 14 | Refills: 0
Start: 2022-11-02 | End: 2022-11-15

## 2022-11-02 RX ORDER — NICOTINE POLACRILEX 2 MG
1 GUM BUCCAL
Qty: 30 | Refills: 0
Start: 2022-11-02 | End: 2022-12-01

## 2022-11-02 RX ORDER — LOSARTAN POTASSIUM 100 MG/1
1 TABLET, FILM COATED ORAL
Qty: 0 | Refills: 0 | DISCHARGE
Start: 2022-11-02

## 2022-11-02 RX ORDER — GABAPENTIN 400 MG/1
1 CAPSULE ORAL
Qty: 0 | Refills: 0 | DISCHARGE
Start: 2022-11-02

## 2022-11-02 RX ORDER — FLUOXETINE HCL 10 MG
1 CAPSULE ORAL
Qty: 0 | Refills: 0 | DISCHARGE

## 2022-11-02 RX ORDER — ALBUTEROL 90 UG/1
2 AEROSOL, METERED ORAL EVERY 6 HOURS
Refills: 0 | Status: DISCONTINUED | OUTPATIENT
Start: 2022-11-02 | End: 2022-11-03

## 2022-11-02 RX ORDER — LOSARTAN POTASSIUM 100 MG/1
1 TABLET, FILM COATED ORAL
Qty: 14 | Refills: 0
Start: 2022-11-02 | End: 2022-11-15

## 2022-11-02 RX ORDER — ALBUTEROL 90 UG/1
2 AEROSOL, METERED ORAL
Qty: 0 | Refills: 0 | DISCHARGE
Start: 2022-11-02

## 2022-11-02 RX ORDER — RISPERIDONE 4 MG/1
1 TABLET ORAL
Qty: 28 | Refills: 0
Start: 2022-11-02 | End: 2022-11-15

## 2022-11-02 RX ORDER — NICOTINE POLACRILEX 2 MG
1 GUM BUCCAL
Qty: 360 | Refills: 0
Start: 2022-11-02 | End: 2022-12-01

## 2022-11-02 RX ORDER — FLUTICASONE PROPIONATE 50 MCG
1 SPRAY, SUSPENSION NASAL
Qty: 1 | Refills: 0
Start: 2022-11-02 | End: 2022-12-01

## 2022-11-02 RX ORDER — PREGABALIN 225 MG/1
1 CAPSULE ORAL
Qty: 0 | Refills: 0 | DISCHARGE
Start: 2022-11-02

## 2022-11-02 RX ORDER — AMLODIPINE BESYLATE 2.5 MG/1
1 TABLET ORAL
Qty: 0 | Refills: 0 | DISCHARGE

## 2022-11-02 RX ORDER — TRAZODONE HCL 50 MG
1 TABLET ORAL
Qty: 14 | Refills: 0
Start: 2022-11-02 | End: 2022-11-15

## 2022-11-02 RX ORDER — PREGABALIN 225 MG/1
1 CAPSULE ORAL
Qty: 30 | Refills: 0
Start: 2022-11-02 | End: 2022-12-01

## 2022-11-02 RX ORDER — PANTOPRAZOLE SODIUM 20 MG/1
1 TABLET, DELAYED RELEASE ORAL
Qty: 0 | Refills: 0 | DISCHARGE
Start: 2022-11-02

## 2022-11-02 RX ORDER — LORATADINE 10 MG/1
1 TABLET ORAL
Qty: 7 | Refills: 0
Start: 2022-11-02 | End: 2022-11-08

## 2022-11-02 RX ORDER — TRAZODONE HCL 50 MG
1 TABLET ORAL
Qty: 0 | Refills: 0 | DISCHARGE
Start: 2022-11-02

## 2022-11-02 RX ORDER — LORATADINE 10 MG/1
1 TABLET ORAL
Qty: 0 | Refills: 0 | DISCHARGE
Start: 2022-11-02

## 2022-11-02 RX ORDER — LORATADINE 10 MG/1
10 TABLET ORAL DAILY
Refills: 0 | Status: DISCONTINUED | OUTPATIENT
Start: 2022-11-02 | End: 2022-11-03

## 2022-11-02 RX ORDER — GABAPENTIN 400 MG/1
1 CAPSULE ORAL
Qty: 42 | Refills: 0
Start: 2022-11-02 | End: 2022-11-15

## 2022-11-02 RX ORDER — NICOTINE POLACRILEX 2 MG
1 GUM BUCCAL
Qty: 0 | Refills: 0 | DISCHARGE

## 2022-11-02 RX ORDER — LITHIUM CARBONATE 300 MG/1
3 TABLET, EXTENDED RELEASE ORAL
Qty: 42 | Refills: 0
Start: 2022-11-02 | End: 2022-11-15

## 2022-11-02 RX ADMIN — Medication 50 MILLIGRAM(S): at 21:06

## 2022-11-02 RX ADMIN — GABAPENTIN 300 MILLIGRAM(S): 400 CAPSULE ORAL at 21:06

## 2022-11-02 RX ADMIN — Medication 2 MILLIGRAM(S): at 16:27

## 2022-11-02 RX ADMIN — Medication 1 PATCH: at 07:52

## 2022-11-02 RX ADMIN — Medication 1 MILLIGRAM(S): at 10:11

## 2022-11-02 RX ADMIN — PREGABALIN 1000 MICROGRAM(S): 225 CAPSULE ORAL at 10:11

## 2022-11-02 RX ADMIN — RISPERIDONE 1 MILLIGRAM(S): 4 TABLET ORAL at 10:11

## 2022-11-02 RX ADMIN — LITHIUM CARBONATE 900 MILLIGRAM(S): 300 TABLET, EXTENDED RELEASE ORAL at 21:06

## 2022-11-02 RX ADMIN — HALOPERIDOL DECANOATE 5 MILLIGRAM(S): 100 INJECTION INTRAMUSCULAR at 10:14

## 2022-11-02 RX ADMIN — RISPERIDONE 1 MILLIGRAM(S): 4 TABLET ORAL at 21:07

## 2022-11-02 RX ADMIN — GABAPENTIN 300 MILLIGRAM(S): 400 CAPSULE ORAL at 10:11

## 2022-11-02 RX ADMIN — LOSARTAN POTASSIUM 50 MILLIGRAM(S): 100 TABLET, FILM COATED ORAL at 10:11

## 2022-11-02 RX ADMIN — Medication 1 PATCH: at 10:14

## 2022-11-02 RX ADMIN — Medication 2 MILLIGRAM(S): at 22:30

## 2022-11-02 RX ADMIN — PANTOPRAZOLE SODIUM 40 MILLIGRAM(S): 20 TABLET, DELAYED RELEASE ORAL at 07:03

## 2022-11-02 RX ADMIN — GABAPENTIN 300 MILLIGRAM(S): 400 CAPSULE ORAL at 12:52

## 2022-11-02 RX ADMIN — Medication 2 MILLIGRAM(S): at 21:08

## 2022-11-02 RX ADMIN — Medication 1 TABLET(S): at 10:11

## 2022-11-02 NOTE — PROGRESS NOTE ADULT - ATTENDING COMMENTS
reported throat pain , some wheezing yesterday resolved with nebs yesterday, chest x ray negative, labs reviewed   ambulating, no more wheezing, some throat pain -stuffy nose, likely post nasal drip. smoker, now on nicotine patch,     Patient is a 46 year-old-male with history of atrial fibrillation (cardioverted in 2013, follows with Florenico Hernandez)- has been in Sinus Rhythm since then,  depression, smoker,  who presented to the ED with suicidal thoughts.  The patient believed that his depression was related to his sobriety.     - fall with dizziness- ekg obtained is Sinus , not afib. clinical exam- good rate/rhythm. CTH negative, No hypoglycemia prior to fall, now resolved.    - Hx of Afib had cardioversion in 2013 and has been in Sinus since then, able to provide cardiologist name Dr. Peter Hernandez has follow up apt on November 10,22- Dr. Rodriges was able to contact card office and confirmed pt on sinus/survillance/no meds required.  -no new EKG- since pt remain stable , to follow up with Dr. Hernandez as out patient.   - if afib noted-please notify us - might need cardiology consult if Afib seen ( would need rate control vs rhythm control (cardioversion)    - Dizziness- could be due to medication, pt on gabapentin 300 tid) /haldol/trazodone prior to fall .-- no dizziness reported 10/31.     - smoker - on nicotine patch.    - HTN - on amlodipine -to continue for now.    - post nasal drip -can give fluticasone nasal spray bid, loratidine 10 mg po q daily for one week.  -Thank you for allowing to participate in the care of this patient., med consult will follow. elizabeth Wall. reported throat pain , some wheezing yesterday resolved with nebs yesterday, chest x ray negative, labs reviewed   ambulating, no more wheezing, some throat pain -stuffy nose, likely post nasal drip. smoker, now on nicotine patch,     Patient is a 46 year-old-male with history of atrial fibrillation (cardioverted in 2013, follows with Florencio Hernandez)- has been in Sinus Rhythm since then,  depression, smoker,  who presented to the ED with suicidal thoughts.  The patient believed that his depression was related to his sobriety.     - fall with dizziness- ekg obtained is Sinus , not afib. clinical exam- good rate/rhythm. CTH negative, No hypoglycemia prior to fall, now resolved.    - Hx of Afib had cardioversion in 2013 and has been in Sinus since then, able to provide cardiologist name Dr. Peter Hernandez has follow up apt on November 10,22- Dr. Rodriges was able to contact card office and confirmed pt on sinus/survillance/no meds required.  -no new EKG- since pt remain stable , to follow up with Dr. Hernandez as out patient.   - if afib noted-please notify us - might need cardiology consult if Afib seen ( would need rate control vs rhythm control (cardioversion)    - Dizziness- could be due to medication, pt on gabapentin 300 tid) /haldol/trazodone prior to fall .-- no dizziness reported 10/31.     - smoker - on nicotine patch.    - HTN - on amlodipine -to continue for now.    - post nasal drip -can give fluticasone nasal spray bid, loratidine 10 mg po q daily for one week- can use lozenges for sore throat.  -Thank you for allowing to participate in the care of this patient., med consult will follow. elizabeth Wall.

## 2022-11-02 NOTE — BH INPATIENT PSYCHIATRY DISCHARGE NOTE - HOSPITAL COURSE
10/26 head CT unremarkable, pt compliant with medication regimen, endorsing persistent avh telling him that he needs to suffer  1027 Patient reports that he has not had avh since yesterday, feels that mood is beginning to stabilize, denies si/hi/avh or PI. Fair sleep/appetite, attending groups  ;;10/30: raising Lothium to 900mg at night; patient willing to stay until 11/4 for mood stabilization and Lithium level.  Not endorsing  suicidal or homicidal ideation intent or plans; no mention of auditory or visual hallucinations Alert; oriented; cognition intact; speech clear; no tremor or evidence of movement impairment.  anxious but Thinking is congruent with affect; no peculiarities of thinking or language use.  Focused on discharge; wants to leave soon.   10/31 Patient continuing to endorse avh, discharge focused

## 2022-11-02 NOTE — BH INPATIENT PSYCHIATRY DISCHARGE NOTE - OTHER PAST PSYCHIATRIC HISTORY (INCLUDE DETAILS REGARDING ONSET, COURSE OF ILLNESS, INPATIENT/OUTPATIENT TREATMENT)
Psychiatric history of bipolar disorder, history of psychiatric hospitalizations, most recent in 2013 following suicide attempt by cutting his wrists. Hx of numerous detox/ rehabs (alcohol/cocaine), currently in sober house x50 days. In treatment recently with Psychiatrist and therapist at [facility] Psychiatry being treated with prozac.

## 2022-11-02 NOTE — BH INPATIENT PSYCHIATRY DISCHARGE NOTE - HPI (INCLUDE ILLNESS QUALITY, SEVERITY, DURATION, TIMING, CONTEXT, MODIFYING FACTORS, ASSOCIATED SIGNS AND SYMPTOMS)
This is a 45 yo  male, domiciled in sober house x50 days,  with 3 children (ages 15, 14, 3, 4y/o lives at home with current wife, older two live with his ex wife), works as a  at [employer] in [place] de[employer]. Medical history significant for atrial fibrillation, HTN and GERD. Psychiatric history of bipolar disorder, history of psychiatric hospitalizations, most recent in 2013 following suicide attempt by cutting his wrists. Hx of numerous detox/ rehabs (alcohol/cocaine), currently in sober house x50 days. In treatment recently with Psychiatrist and therapist at [facility] Psychiatry being treated with prozac. No legal history, no trauma history. Patient presents to [facility] accompanied by a friend with c/o of SI with plan to slit his wrists. Transferred to [facility] 8[facility]ris voluntary status.     Patient states that he has been free of substances and alcohol x51 days, following detox at [facility] and transition to sober house. Substances of choice are etoh and cocaine. He has been using substances since 8/9th grade. He reports having diagnoses of Bipolar but stopped taking his medications years ago. He was recently started on prozac x1 month with minimal effect on mood. SI has been worsening x2 weeks with him recently attempting to cut his wrist with a sharpened pen cap. A friend was very concerned about him and pt disclosed how he was feeling. Friend encouraged him to go to hospital for help. Poor sleep and appetite since going to the sober house, losing an estimated 15lbs and averaging 3 hours each night. No a/vh but has had in the past while intoxicated. No paranoia. No hi.       per ED report:   Patient reports that he was cutting his wrist with a sharpened pen cap when his friend walked in the room and he stopped. He says he has been having suicidal thoughts since last week and he lied to his psychiatrist when they met last week. He says his psychiatrist increased the Prozac from 40 mg to 60 mg and it was the second visit with this provider. He reports using alcohol, cocaine, self-interrupted suicide attempts, and then cutting his wrists in 2013 leading to his last psychiatric hospitalization. He reports being stable from 2013 to 2016 and doesn't remember what medication he was on at that time, just that he was diagnosed with bipolar disorder. He reports in 2016 he stopped taking his psychiatric medication and relapsed using alcohol and cocaine until 50 days ago when he entered sober living and started getting mental health treatment again. He says he is struggling with the transition of being sober,  from his wife again, and says he doesn't know what his thoughts are regarding suicide. He denies homicidal thoughts, denies access to a gun, denies history of violence or legal issues. He says he is willing to be hospitalized.

## 2022-11-02 NOTE — BH INPATIENT PSYCHIATRY DISCHARGE NOTE - NSDCCPCAREPLAN_GEN_ALL_CORE_FT
PRINCIPAL DISCHARGE DIAGNOSIS  Diagnosis: Bipolar disorder with psychotic features  Assessment and Plan of Treatment: Continue current medication regimen and follow up with aftercare appointments      SECONDARY DISCHARGE DIAGNOSES  Diagnosis: Severe alcohol use disorder, in early remission  Assessment and Plan of Treatment: Continue current medication regimen and follow up with aftercare appointments    Diagnosis: Cluster B personality disorder in adult  Assessment and Plan of Treatment: Continue current medication regimen and follow up with aftercare appointments    Diagnosis: HTN (hypertension)  Assessment and Plan of Treatment: Continue current medication regimen and follow up with aftercare appointments    Diagnosis: GERD (gastroesophageal reflux disease)  Assessment and Plan of Treatment: Continue current medication regimen and follow up with aftercare appointments    Diagnosis: Seasonal allergies  Assessment and Plan of Treatment: Continue current medication regimen and follow up with aftercare appointments    Diagnosis: Cocaine use disorder, mild, in early remission  Assessment and Plan of Treatment: Continue current medication regimen and follow up with aftercare appointments

## 2022-11-02 NOTE — BH INPATIENT PSYCHIATRY DISCHARGE NOTE - NSDCMRMEDTOKEN_GEN_ALL_CORE_FT
albuterol 90 mcg/inh inhalation aerosol: 2 puff(s) inhaled every 6 hours, As needed, SOB  cyanocobalamin 1000 mcg oral tablet: 1 tab(s) orally once a day  fluticasone 50 mcg/inh nasal spray: 1 spray(s) nasal 2 times a day  folic acid 1 mg oral tablet: 1 tab(s) orally once a day  gabapentin 300 mg oral capsule: 1 cap(s) orally 3 times a day  lithium 300 mg oral capsule: 3 cap(s) orally once a day (at bedtime)  loratadine 10 mg oral tablet: 1 tab(s) orally once a day  losartan 50 mg oral tablet: 1 tab(s) orally once a day  Multiple Vitamins oral tablet: 1 tab(s) orally once a day  Nicorette 2 mg oral transmucosal gum: 1 gum oral transmucosal every 2 hours, As Needed  nicotine 21 mg/24 hr transdermal film, extended release: 1 patch transdermal once a day  pantoprazole 40 mg oral delayed release tablet: 1 tab(s) orally once a day (before a meal)  risperiDONE 1 mg oral tablet: 1 tab(s) orally 2 times a day  traZODone 50 mg oral tablet: 1 tab(s) orally once a day (at bedtime)

## 2022-11-02 NOTE — BH INPATIENT PSYCHIATRY DISCHARGE NOTE - NSBHMETABOLIC_PSY_ALL_CORE_FT
BMI: BMI (kg/m2): 32.1 (10-23-22 @ 22:00)  HbA1c: A1C with Estimated Average Glucose Result: 5.6 % (10-26-22 @ 05:30)    Glucose: POCT Blood Glucose.: 118 mg/dL (10-28-22 @ 13:51)    BP: 136/93 (11-02-22 @ 08:15) (128/86 - 145/74)  Lipid Panel: Date/Time: 10-26-22 @ 05:30  Cholesterol, Serum: 144  Direct LDL: --  HDL Cholesterol, Serum: 33  Total Cholesterol/HDL Ration Measurement: --  Triglycerides, Serum: 113

## 2022-11-03 VITALS
RESPIRATION RATE: 18 BRPM | DIASTOLIC BLOOD PRESSURE: 95 MMHG | HEART RATE: 64 BPM | TEMPERATURE: 98 F | OXYGEN SATURATION: 98 % | SYSTOLIC BLOOD PRESSURE: 150 MMHG

## 2022-11-03 LAB
ALBUMIN SERPL ELPH-MCNC: 4.4 G/DL — SIGNIFICANT CHANGE UP (ref 3.3–5)
ALP SERPL-CCNC: 78 U/L — SIGNIFICANT CHANGE UP (ref 40–120)
ALT FLD-CCNC: 44 U/L — SIGNIFICANT CHANGE UP (ref 10–45)
ANION GAP SERPL CALC-SCNC: 10 MMOL/L — SIGNIFICANT CHANGE UP (ref 5–17)
AST SERPL-CCNC: 29 U/L — SIGNIFICANT CHANGE UP (ref 10–40)
BILIRUB SERPL-MCNC: 0.4 MG/DL — SIGNIFICANT CHANGE UP (ref 0.2–1.2)
BUN SERPL-MCNC: 15 MG/DL — SIGNIFICANT CHANGE UP (ref 7–23)
CALCIUM SERPL-MCNC: 9.6 MG/DL — SIGNIFICANT CHANGE UP (ref 8.4–10.5)
CHLORIDE SERPL-SCNC: 102 MMOL/L — SIGNIFICANT CHANGE UP (ref 96–108)
CO2 SERPL-SCNC: 27 MMOL/L — SIGNIFICANT CHANGE UP (ref 22–31)
CREAT SERPL-MCNC: 1 MG/DL — SIGNIFICANT CHANGE UP (ref 0.5–1.3)
EGFR: 95 ML/MIN/1.73M2 — SIGNIFICANT CHANGE UP
GLUCOSE SERPL-MCNC: 134 MG/DL — HIGH (ref 70–99)
LITHIUM SERPL-MCNC: 0.5 MMOL/L — LOW (ref 0.6–1.2)
POTASSIUM SERPL-MCNC: 4.4 MMOL/L — SIGNIFICANT CHANGE UP (ref 3.5–5.3)
POTASSIUM SERPL-SCNC: 4.4 MMOL/L — SIGNIFICANT CHANGE UP (ref 3.5–5.3)
PROT SERPL-MCNC: 7.3 G/DL — SIGNIFICANT CHANGE UP (ref 6–8.3)
SODIUM SERPL-SCNC: 139 MMOL/L — SIGNIFICANT CHANGE UP (ref 135–145)

## 2022-11-03 PROCEDURE — 83036 HEMOGLOBIN GLYCOSYLATED A1C: CPT

## 2022-11-03 PROCEDURE — 85027 COMPLETE CBC AUTOMATED: CPT

## 2022-11-03 PROCEDURE — U0003: CPT

## 2022-11-03 PROCEDURE — 80061 LIPID PANEL: CPT

## 2022-11-03 PROCEDURE — U0005: CPT

## 2022-11-03 PROCEDURE — 70450 CT HEAD/BRAIN W/O DYE: CPT

## 2022-11-03 PROCEDURE — 80178 ASSAY OF LITHIUM: CPT

## 2022-11-03 PROCEDURE — 36415 COLL VENOUS BLD VENIPUNCTURE: CPT

## 2022-11-03 PROCEDURE — 99222 1ST HOSP IP/OBS MODERATE 55: CPT | Mod: GC

## 2022-11-03 PROCEDURE — 80053 COMPREHEN METABOLIC PANEL: CPT

## 2022-11-03 PROCEDURE — 82962 GLUCOSE BLOOD TEST: CPT

## 2022-11-03 PROCEDURE — 0225U NFCT DS DNA&RNA 21 SARSCOV2: CPT

## 2022-11-03 PROCEDURE — 94640 AIRWAY INHALATION TREATMENT: CPT

## 2022-11-03 PROCEDURE — 71045 X-RAY EXAM CHEST 1 VIEW: CPT

## 2022-11-03 RX ORDER — TRAZODONE HCL 50 MG
1 TABLET ORAL
Qty: 14 | Refills: 0
Start: 2022-11-03 | End: 2022-11-16

## 2022-11-03 RX ORDER — NICOTINE POLACRILEX 2 MG
1 GUM BUCCAL
Qty: 360 | Refills: 0
Start: 2022-11-03 | End: 2022-12-02

## 2022-11-03 RX ORDER — FOLIC ACID 0.8 MG
1 TABLET ORAL
Qty: 14 | Refills: 0
Start: 2022-11-03 | End: 2022-11-16

## 2022-11-03 RX ORDER — NICOTINE POLACRILEX 2 MG
1 GUM BUCCAL
Qty: 30 | Refills: 0
Start: 2022-11-03 | End: 2022-12-02

## 2022-11-03 RX ORDER — RISPERIDONE 4 MG/1
1 TABLET ORAL
Qty: 28 | Refills: 0
Start: 2022-11-03 | End: 2022-11-16

## 2022-11-03 RX ORDER — LITHIUM CARBONATE 300 MG/1
3 TABLET, EXTENDED RELEASE ORAL
Qty: 42 | Refills: 0
Start: 2022-11-03 | End: 2022-11-16

## 2022-11-03 RX ORDER — FLUTICASONE PROPIONATE 50 MCG
1 SPRAY, SUSPENSION NASAL
Qty: 1 | Refills: 0
Start: 2022-11-03 | End: 2022-12-02

## 2022-11-03 RX ORDER — ALBUTEROL 90 UG/1
2 AEROSOL, METERED ORAL
Qty: 1 | Refills: 0
Start: 2022-11-03 | End: 2022-12-02

## 2022-11-03 RX ORDER — LORATADINE 10 MG/1
1 TABLET ORAL
Qty: 7 | Refills: 0
Start: 2022-11-03 | End: 2022-11-09

## 2022-11-03 RX ORDER — PREGABALIN 225 MG/1
1 CAPSULE ORAL
Qty: 30 | Refills: 0
Start: 2022-11-03 | End: 2022-12-02

## 2022-11-03 RX ORDER — PANTOPRAZOLE SODIUM 20 MG/1
1 TABLET, DELAYED RELEASE ORAL
Qty: 30 | Refills: 0
Start: 2022-11-03 | End: 2022-12-02

## 2022-11-03 RX ORDER — LOSARTAN POTASSIUM 100 MG/1
1 TABLET, FILM COATED ORAL
Qty: 14 | Refills: 0
Start: 2022-11-03 | End: 2022-11-16

## 2022-11-03 RX ORDER — GABAPENTIN 400 MG/1
1 CAPSULE ORAL
Qty: 42 | Refills: 0
Start: 2022-11-03 | End: 2022-11-16

## 2022-11-03 RX ADMIN — GABAPENTIN 300 MILLIGRAM(S): 400 CAPSULE ORAL at 09:38

## 2022-11-03 RX ADMIN — Medication 1 MILLIGRAM(S): at 09:38

## 2022-11-03 RX ADMIN — PREGABALIN 1000 MICROGRAM(S): 225 CAPSULE ORAL at 09:37

## 2022-11-03 RX ADMIN — Medication 2 MILLIGRAM(S): at 09:38

## 2022-11-03 RX ADMIN — Medication 1 TABLET(S): at 09:38

## 2022-11-03 RX ADMIN — RISPERIDONE 1 MILLIGRAM(S): 4 TABLET ORAL at 09:38

## 2022-11-03 RX ADMIN — PANTOPRAZOLE SODIUM 40 MILLIGRAM(S): 20 TABLET, DELAYED RELEASE ORAL at 07:17

## 2022-11-03 RX ADMIN — LOSARTAN POTASSIUM 50 MILLIGRAM(S): 100 TABLET, FILM COATED ORAL at 09:38

## 2022-11-03 RX ADMIN — Medication 1 PATCH: at 07:12

## 2022-11-03 NOTE — PROGRESS NOTE ADULT - ATTENDING COMMENTS
Pt seen and examined with Dr. Wall. Pt denies cough, SOB, sore throat is improving. no SI.  PE:   Lungs: clear, no adventitious sounds   Heart: RRR, normal S1 and S2, no murmurs     Medically stable, d/c plan per Psychiatry. Pt to follow up PMD.

## 2022-11-03 NOTE — BH DISCHARGE NOTE NURSING/SOCIAL WORK/PSYCH REHAB - NSDCPRGOAL_PSY_ALL_CORE
Pt attended groups regularly during admission.  Pt has demonstrated improvement in mood.  Pt had endorsed suicidal ideation and visual hallucinations regularly upon admission and attempted to harm himself while on the unit.  Pt has appeared consistently brighter and has been social with peers.  Pt has endorsed significantly less visual hallucinations and has not attempted to self-harm recently.  Pt has discussed how he wants to maintain sobriety going forward.  Pt has endorsed readiness for discharge and completed a safety plan.

## 2022-11-03 NOTE — PROGRESS NOTE ADULT - SUBJECTIVE AND OBJECTIVE BOX
INTERVAL HPI/OVERNIGHT EVENTS: Patient states his throat continues to be sore however denying any dyspnea.     VITAL SIGNS:  T(F): 97.9 (11-03-22 @ 08:15)  HR: 64 (11-03-22 @ 08:15)  BP: 150/95 (11-03-22 @ 08:15)  RR: 18 (11-03-22 @ 08:15)  SpO2: 98% (11-03-22 @ 08:15)  Wt(kg): --    PHYSICAL EXAM:     Constitutional: walking around, comfortable, NAD  Respiratory: CTA B/L; no W/R/R, no retractions  Cardiac: +S1/S2; RRR; no M/R/G;   Neurologic: AAOx3;   Psychiatric: affect and characteristics of appearance, verbalizations, behaviors are appropriate    MEDICATIONS  (STANDING):  cyanocobalamin 1000 MICROGram(s) Oral daily  fluticasone propionate 50 MICROgram(s)/spray Nasal Spray 1 Spray(s) Both Nostrils two times a day  folic acid 1 milliGRAM(s) Oral daily  gabapentin 300 milliGRAM(s) Oral three times a day  influenza   Vaccine 0.5 milliLiter(s) IntraMuscular once  lithium 900 milliGRAM(s) Oral at bedtime  loratadine 10 milliGRAM(s) Oral daily  losartan 50 milliGRAM(s) Oral daily  multivitamin 1 Tablet(s) Oral daily  nicotine - 21 mG/24Hr(s) Patch 1 Patch Transdermal daily  pantoprazole    Tablet 40 milliGRAM(s) Oral before breakfast  risperiDONE   Tablet 1 milliGRAM(s) Oral at bedtime  risperiDONE   Tablet 1 milliGRAM(s) Oral daily  traZODone 50 milliGRAM(s) Oral at bedtime    MEDICATIONS  (PRN):  acetaminophen     Tablet .. 650 milliGRAM(s) Oral every 6 hours PRN Moderate Pain (4 - 6)  ALBUTerol    90 MICROgram(s) HFA Inhaler 2 Puff(s) Inhalation every 6 hours PRN SOB  aluminum hydroxide/magnesium hydroxide/simethicone Suspension 30 milliLiter(s) Oral every 6 hours PRN Dyspepsia  diphenhydrAMINE 50 milliGRAM(s) Oral every 6 hours PRN agitation  haloperidol     Tablet 5 milliGRAM(s) Oral every 6 hours PRN agitation  ibuprofen  Tablet. 400 milliGRAM(s) Oral every 8 hours PRN Temp greater or equal to 38C (100.4F), Mild Pain (1 - 3)  LORazepam     Tablet 2 milliGRAM(s) Oral every 6 hours PRN agitation  nicotine  Polacrilex Gum 2 milliGRAM(s) Oral every 2 hours PRN NRT      Allergies    No Known Allergies    Intolerances        LABS:    11-03    139  |  102  |  15  ----------------------------<  134<H>  4.4   |  27  |  1.00    Ca    9.6      03 Nov 2022 05:30    TPro  7.3  /  Alb  4.4  /  TBili  0.4  /  DBili  x   /  AST  29  /  ALT  44  /  AlkPhos  78  11-03    RADIOLOGY & ADDITIONAL TESTS: Reviewed
  Subjective/ROS: Denies HA, CP, SOB, n/v, changes in bowel/urinary habits.  12pt ROS otherwise negative.    VITALS  Vital Signs Last 24 Hrs  T(C): 36.6 (31 Oct 2022 09:00), Max: 36.6 (30 Oct 2022 17:00)  T(F): 97.8 (31 Oct 2022 09:00), Max: 97.8 (30 Oct 2022 17:00)  HR: 63 (31 Oct 2022 09:00) (63 - 63)  BP: 130/89 (31 Oct 2022 09:00) (128/86 - 130/89)  BP(mean): --  RR: 18 (31 Oct 2022 09:00) (18 - 18)  SpO2: 97% (31 Oct 2022 09:00) (97% - 99%)    Parameters below as of 30 Oct 2022 17:00  Patient On (Oxygen Delivery Method): room air      CAPILLARY BLOOD GLUCOSE      PHYSICAL EXAM  General: A&Ox3; NAD  Head: NC/AT; MMM; PERRL; EOMI;  Neck: Supple; no JVD  Respiratory: CTA B/L; no wheezes/crackles   Cardiovascular: Regular rhythm/rate; S1/S2   Gastrointestinal: Soft; NTND; normoactive BS  Extremities: WWP; no edema/cyanosis  Neurological:  CNII-XII grossly intact; no obvious focal deficits    MEDICATIONS  (STANDING):  cyanocobalamin 1000 MICROGram(s) Oral daily  folic acid 1 milliGRAM(s) Oral daily  gabapentin 300 milliGRAM(s) Oral three times a day  haloperidol     Tablet 5 milliGRAM(s) Oral at bedtime  influenza   Vaccine 0.5 milliLiter(s) IntraMuscular once  lithium 900 milliGRAM(s) Oral at bedtime  losartan 50 milliGRAM(s) Oral daily  multivitamin 1 Tablet(s) Oral daily  nicotine - 21 mG/24Hr(s) Patch 1 Patch Transdermal daily  pantoprazole    Tablet 40 milliGRAM(s) Oral before breakfast  traZODone 50 milliGRAM(s) Oral at bedtime    MEDICATIONS  (PRN):  acetaminophen     Tablet .. 650 milliGRAM(s) Oral every 6 hours PRN Moderate Pain (4 - 6)  aluminum hydroxide/magnesium hydroxide/simethicone Suspension 30 milliLiter(s) Oral every 6 hours PRN Dyspepsia  diphenhydrAMINE 50 milliGRAM(s) Oral every 6 hours PRN agitation  haloperidol     Tablet 5 milliGRAM(s) Oral every 6 hours PRN agitation  LORazepam     Tablet 2 milliGRAM(s) Oral every 6 hours PRN agitation  nicotine  Polacrilex Gum 2 milliGRAM(s) Oral every 2 hours PRN NRT      No Known Allergies      LABS    
INTERVAL HPI/OVERNIGHT EVENTS: States his breathing is better. Still reports a bit of a sore throat and stuffy nose    VITAL SIGNS:  T(C): 36.6 (11-02-22 @ 08:15), Max: 36.8 (11-01-22 @ 17:05)  T(F): 97.8 (11-02-22 @ 08:15), Max: 98.2 (11-01-22 @ 17:05)  HR: 64 (11-02-22 @ 08:15) (64 - 85)  BP: 136/93 (11-02-22 @ 08:15) (136/93 - 145/74)  BP(mean): --  RR: 18 (11-02-22 @ 08:15) (17 - 18)  SpO2: 97% (11-02-22 @ 08:15) (96% - 97%)  Wt(kg): --    PHYSICAL EXAM:    Constitutional: walking around, comfortable, NAD  Respiratory: CTA B/L; no W/R/R, no retractions  Cardiac: +S1/S2; RRR; no M/R/G;   Neurologic: AAOx3;   Psychiatric: affect and characteristics of appearance, verbalizations, behaviors are appropriate    MEDICATIONS  (STANDING):  cyanocobalamin 1000 MICROGram(s) Oral daily  folic acid 1 milliGRAM(s) Oral daily  gabapentin 300 milliGRAM(s) Oral three times a day  influenza   Vaccine 0.5 milliLiter(s) IntraMuscular once  lithium 900 milliGRAM(s) Oral at bedtime  losartan 50 milliGRAM(s) Oral daily  multivitamin 1 Tablet(s) Oral daily  nicotine - 21 mG/24Hr(s) Patch 1 Patch Transdermal daily  pantoprazole    Tablet 40 milliGRAM(s) Oral before breakfast  risperiDONE   Tablet 1 milliGRAM(s) Oral daily  risperiDONE   Tablet 1 milliGRAM(s) Oral at bedtime  traZODone 50 milliGRAM(s) Oral at bedtime    MEDICATIONS  (PRN):  acetaminophen     Tablet .. 650 milliGRAM(s) Oral every 6 hours PRN Moderate Pain (4 - 6)  aluminum hydroxide/magnesium hydroxide/simethicone Suspension 30 milliLiter(s) Oral every 6 hours PRN Dyspepsia  diphenhydrAMINE 50 milliGRAM(s) Oral every 6 hours PRN agitation  haloperidol     Tablet 5 milliGRAM(s) Oral every 6 hours PRN agitation  ibuprofen  Tablet. 400 milliGRAM(s) Oral every 8 hours PRN Temp greater or equal to 38C (100.4F), Mild Pain (1 - 3)  LORazepam     Tablet 2 milliGRAM(s) Oral every 6 hours PRN agitation  nicotine  Polacrilex Gum 2 milliGRAM(s) Oral every 2 hours PRN NRT      Allergies    No Known Allergies    Intolerances        LABS:                        14.4   5.32  )-----------( 174      ( 31 Oct 2022 20:37 )             42.1     10-31    138  |  101  |  18  ----------------------------<  112<H>  4.2   |  26  |  0.98    Ca    9.7      31 Oct 2022 20:37    TPro  6.8  /  Alb  4.2  /  TBili  0.2  /  DBili  x   /  AST  19  /  ALT  35  /  AlkPhos  80  10-31          RADIOLOGY & ADDITIONAL TESTS: Reviewed    
Subjective/ROS: Denies HA, CP, SOB, n/v, changes in bowel/urinary habits.  12pt ROS otherwise negative.    VITALS  Vital Signs Last 24 Hrs  T(C): 36.8 (28 Oct 2022 17:01), Max: 36.8 (28 Oct 2022 17:01)  T(F): 98.2 (28 Oct 2022 17:01), Max: 98.2 (28 Oct 2022 17:01)  HR: 59 (29 Oct 2022 09:00) (59 - 71)  BP: 131/87 (29 Oct 2022 09:00) (120/76 - 169/139)  BP(mean): --  RR: 18 (29 Oct 2022 09:00) (18 - 18)  SpO2: 96% (29 Oct 2022 09:00) (96% - 99%)        CAPILLARY BLOOD GLUCOSE      POCT Blood Glucose.: 118 mg/dL (28 Oct 2022 13:51)      PHYSICAL EXAM  General: A&Ox3; NAD  Head: NC/AT; MMM; PERRL; EOMI;  Neck: Supple; no JVD  Respiratory: CTA B/L; no wheezes/crackles   Cardiovascular: Regular rhythm/rate; S1/S2   Gastrointestinal: Soft; NTND; normoactive BS  Extremities: WWP; no edema/cyanosis  Neurological:  CNII-XII grossly intact; no obvious focal deficits    MEDICATIONS  (STANDING):  cyanocobalamin 1000 MICROGram(s) Oral daily  folic acid 1 milliGRAM(s) Oral daily  gabapentin 300 milliGRAM(s) Oral three times a day  haloperidol     Tablet 5 milliGRAM(s) Oral at bedtime  influenza   Vaccine 0.5 milliLiter(s) IntraMuscular once  lithium 600 milliGRAM(s) Oral at bedtime  losartan 50 milliGRAM(s) Oral daily  multivitamin 1 Tablet(s) Oral daily  nicotine - 21 mG/24Hr(s) Patch 1 Patch Transdermal daily  pantoprazole    Tablet 40 milliGRAM(s) Oral before breakfast  traZODone 50 milliGRAM(s) Oral at bedtime    MEDICATIONS  (PRN):  acetaminophen     Tablet .. 650 milliGRAM(s) Oral every 6 hours PRN Moderate Pain (4 - 6)  aluminum hydroxide/magnesium hydroxide/simethicone Suspension 30 milliLiter(s) Oral every 6 hours PRN Dyspepsia  diphenhydrAMINE 50 milliGRAM(s) Oral every 6 hours PRN agitation  haloperidol     Tablet 5 milliGRAM(s) Oral every 6 hours PRN agitation  LORazepam     Tablet 2 milliGRAM(s) Oral every 6 hours PRN agitation  nicotine  Polacrilex Gum 2 milliGRAM(s) Oral every 2 hours PRN NRT      No Known Allergies      LABS                    IMAGING/EKG/ETC  EKG:  Xray:  CT:  MRI:
INTERVAL HPI/OVERNIGHT EVENTS: Overnight the patient reported mild sore throat and dyspnea. Physical exam at that time showed mild tachypnea and bilateral diffuse wheezes. Hemodynamically stable. Given duoneb, RVP, cxr and labs performed. Currently reporting improvement in symptoms, denying any dyspnea.    VITAL SIGNS:  T(C): 36.6 (10-31-22 @ 16:31), Max: 36.6 (10-31-22 @ 16:31)  T(F): 97.9 (10-31-22 @ 16:31), Max: 97.9 (10-31-22 @ 16:31)  HR: 66 (10-31-22 @ 16:31) (66 - 66)  BP: 131/83 (10-31-22 @ 16:31) (131/83 - 131/83)  BP(mean): --  RR: 18 (10-31-22 @ 16:31) (18 - 18)  SpO2: 95% (10-31-22 @ 16:31) (95% - 95%)  Wt(kg): --    PHYSICAL EXAM:    Constitutional: walking in hallways, no acute distress  Eyes: anicteric sclera  ENT: no nasal discharge; MMM  Neck: supple;  Respiratory: CTA B/L; no W/R/R, no retractions  Cardiac: +S1/S2; RRR; no M/R/G;  Neurologic: AAOx3;  Psychiatric: affect and characteristics of appearance, verbalizations, behaviors are appropriate    MEDICATIONS  (STANDING):  cyanocobalamin 1000 MICROGram(s) Oral daily  folic acid 1 milliGRAM(s) Oral daily  gabapentin 300 milliGRAM(s) Oral three times a day  influenza   Vaccine 0.5 milliLiter(s) IntraMuscular once  lithium 900 milliGRAM(s) Oral at bedtime  losartan 50 milliGRAM(s) Oral daily  multivitamin 1 Tablet(s) Oral daily  nicotine - 21 mG/24Hr(s) Patch 1 Patch Transdermal daily  pantoprazole    Tablet 40 milliGRAM(s) Oral before breakfast  risperiDONE   Tablet 1 milliGRAM(s) Oral daily  risperiDONE   Tablet 1 milliGRAM(s) Oral at bedtime  traZODone 50 milliGRAM(s) Oral at bedtime    MEDICATIONS  (PRN):  acetaminophen     Tablet .. 650 milliGRAM(s) Oral every 6 hours PRN Moderate Pain (4 - 6)  aluminum hydroxide/magnesium hydroxide/simethicone Suspension 30 milliLiter(s) Oral every 6 hours PRN Dyspepsia  diphenhydrAMINE 50 milliGRAM(s) Oral every 6 hours PRN agitation  haloperidol     Tablet 5 milliGRAM(s) Oral every 6 hours PRN agitation  ibuprofen  Tablet. 400 milliGRAM(s) Oral every 8 hours PRN Temp greater or equal to 38C (100.4F), Mild Pain (1 - 3)  LORazepam     Tablet 2 milliGRAM(s) Oral every 6 hours PRN agitation  nicotine  Polacrilex Gum 2 milliGRAM(s) Oral every 2 hours PRN NRT      Allergies    No Known Allergies    Intolerances        LABS:                        14.4   5.32  )-----------( 174      ( 31 Oct 2022 20:37 )             42.1     10-31    138  |  101  |  18  ----------------------------<  112<H>  4.2   |  26  |  0.98    Ca    9.7      31 Oct 2022 20:37    TPro  6.8  /  Alb  4.2  /  TBili  0.2  /  DBili  x   /  AST  19  /  ALT  35  /  AlkPhos  80  10-31          RADIOLOGY & ADDITIONAL TESTS: Reviewed

## 2022-11-03 NOTE — PROGRESS NOTE ADULT - ASSESSMENT
46 year old male with history of atrial fibrillation s/p cardioversion who presents for suicidal ideation admitted to psychiatry service now with fall and concern for atrial fibrillation on EKG, medicine consulted for a fib management.     #sore throat  Patient reporting sore throat, initially with mild tachypnea. Vitals at that time stable, symptoms improved with duoneb x1. RVP and covid negative, cbc and cmp within normal limits, no acute infiltrate on CXR. Symptoms likely secondary to viral illness  -please discharge patient home with albuterol HFA inhaler PRN  -patient with some post nasal drip, please begin loratidine 10mg daily x1 week and begin while inpatient  -please ensure patient has adequate follow up with PCP    #Atrial Fibrillation  -There is no indication for anticoagulation  -The patient does not require rate control  -If the patient becomes tachycardic again please repeat EKG - decision to start rate control may be necessary if patient is in afib.     #Dizziness   Patient initially reported complaints of dizziness and likley drug induced. Prior to fall patient was given haldol, Lorazepam and gabapentin. Dizziness now resolved, patient denying any acute complaints    Plan discussed with Dr. Keen, recs considered final when note is attested by attending  
46 year old male with history of atrial fibrillation s/p cardioversion who presents for suicidal ideation admitted to psychiatry service now with fall and concern for atrial fibrillation on EKG, medicine consulted for a fib management.     #Atrial Fibrillation  Discussed with patient cardiologist Florencio Hernandez ([phone]) the patient was cardioverted in 2013.  Since that time the patient has been in sinus rhythm and has not had any further procedures. CHADSVASC score is 0 at this.  Upon review of EKG the patient appears to be in normal sinus rhythm, albeit there is significant artifact.   -There is no indication for anticoagulation  -The patient does not require rate control  -If the patient becomes tachycardic again please repeat EKG - decision to start rate control may be necessary if patient is in afib.     #Dizziness   Patient with complaints of dizziness and likley drug induced. Prior to fall patient was given haldol, Lorazepam and gabapentin. 
46 year old male with history of atrial fibrillation s/p cardioversion who presents for suicidal ideation admitted to psychiatry service now with fall and concern for atrial fibrillation on EKG, medicine consulted for a fib management.     #Atrial Fibrillation  Discussed with patient cardiologist Florencio Hernandez ([phone]) the patient was cardioverted in 2013.  Since that time the patient has been in sinus rhythm and has not had any further procedures. CHADSVASC score is 0 at this.  Upon review of EKG the patient appears to be in normal sinus rhythm, albeit there is significant artifact.   -Please repeat EKG now  -There is no indication for anticoagulation  -The patient does not require rate control  -If the patient becomes tachycardic again please repeat EKG - decision to start rate control may be necessary if patient is in afib.     #Dizziness   Patient with complaints of dizziness and likley drug induced. Prior to fall patient was given haldol, Lorazepam and gabapentin. 
46 year old male with history of atrial fibrillation s/p cardioversion who presents for suicidal ideation admitted to psychiatry service now with fall and concern for atrial fibrillation on EKG, medicine consulted for a fib management.     #sore throat  Patient reporting sore throat, initially with mild tachypnea. Vitals at that time stable, symptoms improved with duoneb x1. RVP and covid negative, cbc and cmp within normal limits, no acute infiltrate on CXR. Symptoms likely secondary to viral illness  -please discharge patient home with albuterol HFA inhaler PRN  -patient with some post nasal drip, please begin loratidine 10mg daily x1 week and begin while inpatient  -please ensure patient has adequate follow up with PCP    #Atrial Fibrillation  -There is no indication for anticoagulation  -The patient does not require rate control  -If the patient becomes tachycardic again please repeat EKG - decision to start rate control may be necessary if patient is in afib.     #Dizziness   Patient initially reported complaints of dizziness and likley drug induced. Prior to fall patient was given haldol, Lorazepam and gabapentin. Dizziness now resolved, patient denying any acute complaints    Plan discussed with Dr. San, recs considered final when note is attested by attending  
46 year old male with history of atrial fibrillation s/p cardioversion who presents for suicidal ideation admitted to psychiatry service now with fall and concern for atrial fibrillation on EKG, medicine consulted for a fib management.     #sore throat  Patient reporting sore throat, initially with mild tachypnea. Vitals at that time stable, symptoms improved with duoneb x1. RVP and covid negative, cbc and cmp within normal limits, no acute infiltrate on CXR. Symptoms likely secondary to viral illness  -please discharge patient home with albuterol HFA inhaler PRN  -patient with some post nasal drip, please discharge on loratidine 10mg daily x1 week  -please ensure patient has adequate follow up with PCP    #Atrial Fibrillation  -There is no indication for anticoagulation  -The patient does not require rate control  -If the patient becomes tachycardic again please repeat EKG - decision to start rate control may be necessary if patient is in afib.     #Dizziness   Patient initially reported complaints of dizziness and likley drug induced. Prior to fall patient was given haldol, Lorazepam and gabapentin. Dizziness now resolved, patient denying any acute complaints    Plan discussed with colton Poole considered final when note is attested by attending

## 2022-11-03 NOTE — BH DISCHARGE NOTE NURSING/SOCIAL WORK/PSYCH REHAB - PATIENT PORTAL LINK FT
You can access the FollowMyHealth Patient Portal offered by [facility] by registering at the following website: [email or web address]. By joining [facility]’s FollowMyHealth portal, you will also be able to view your health information using other applications (apps) compatible with our system.

## 2022-11-03 NOTE — BH DISCHARGE NOTE NURSING/SOCIAL WORK/PSYCH REHAB - NSTOBACCOOTHER_PSY_ALL_CORE_FT
Call [facility] Smokers' Quitline at [phone] ([phone]) or visit [email or web address]

## 2022-11-07 ENCOUNTER — EMERGENCY (EMERGENCY)
Facility: HOSPITAL | Age: 47
LOS: 0 days | Discharge: ANOTHER HOSPITAL | End: 2022-11-08
Attending: EMERGENCY MEDICINE

## 2022-11-07 VITALS
WEIGHT: 242.95 LBS | RESPIRATION RATE: 17 BRPM | OXYGEN SATURATION: 96 % | TEMPERATURE: 98 F | HEART RATE: 81 BPM | DIASTOLIC BLOOD PRESSURE: 85 MMHG | SYSTOLIC BLOOD PRESSURE: 156 MMHG | HEIGHT: 74 IN

## 2022-11-07 LAB
ALBUMIN SERPL ELPH-MCNC: 3.4 G/DL — SIGNIFICANT CHANGE UP (ref 3.3–5)
ALP SERPL-CCNC: 71 U/L — SIGNIFICANT CHANGE UP (ref 40–120)
ALT FLD-CCNC: 50 U/L — SIGNIFICANT CHANGE UP (ref 12–78)
AMPHET UR-MCNC: NEGATIVE — SIGNIFICANT CHANGE UP
ANION GAP SERPL CALC-SCNC: 7 MMOL/L — SIGNIFICANT CHANGE UP (ref 5–17)
APAP SERPL-MCNC: <2 UG/ML — LOW (ref 10–30)
AST SERPL-CCNC: 24 U/L — SIGNIFICANT CHANGE UP (ref 15–37)
BARBITURATES UR SCN-MCNC: NEGATIVE — SIGNIFICANT CHANGE UP
BASOPHILS # BLD AUTO: 0.07 K/UL — SIGNIFICANT CHANGE UP (ref 0–0.2)
BASOPHILS NFR BLD AUTO: 1 % — SIGNIFICANT CHANGE UP (ref 0–2)
BENZODIAZ UR-MCNC: NEGATIVE — SIGNIFICANT CHANGE UP
BILIRUB SERPL-MCNC: 0.4 MG/DL — SIGNIFICANT CHANGE UP (ref 0.2–1.2)
BUN SERPL-MCNC: 10 MG/DL — SIGNIFICANT CHANGE UP (ref 7–23)
CALCIUM SERPL-MCNC: 8.9 MG/DL — SIGNIFICANT CHANGE UP (ref 8.5–10.1)
CHLORIDE SERPL-SCNC: 107 MMOL/L — SIGNIFICANT CHANGE UP (ref 96–108)
CO2 SERPL-SCNC: 27 MMOL/L — SIGNIFICANT CHANGE UP (ref 22–31)
COCAINE METAB.OTHER UR-MCNC: NEGATIVE — SIGNIFICANT CHANGE UP
CREAT SERPL-MCNC: 1.12 MG/DL — SIGNIFICANT CHANGE UP (ref 0.5–1.3)
EGFR: 83 ML/MIN/1.73M2 — SIGNIFICANT CHANGE UP
EOSINOPHIL # BLD AUTO: 0.23 K/UL — SIGNIFICANT CHANGE UP (ref 0–0.5)
EOSINOPHIL NFR BLD AUTO: 3.1 % — SIGNIFICANT CHANGE UP (ref 0–6)
ETHANOL SERPL-MCNC: <10 MG/DL — SIGNIFICANT CHANGE UP (ref 0–10)
FLUAV AG NPH QL: SIGNIFICANT CHANGE UP
FLUBV AG NPH QL: SIGNIFICANT CHANGE UP
GLUCOSE SERPL-MCNC: 90 MG/DL — SIGNIFICANT CHANGE UP (ref 70–99)
HCT VFR BLD CALC: 40 % — SIGNIFICANT CHANGE UP (ref 39–50)
HGB BLD-MCNC: 13.6 G/DL — SIGNIFICANT CHANGE UP (ref 13–17)
IMM GRANULOCYTES NFR BLD AUTO: 0.3 % — SIGNIFICANT CHANGE UP (ref 0–0.9)
LYMPHOCYTES # BLD AUTO: 1.94 K/UL — SIGNIFICANT CHANGE UP (ref 1–3.3)
LYMPHOCYTES # BLD AUTO: 26.5 % — SIGNIFICANT CHANGE UP (ref 13–44)
MCHC RBC-ENTMCNC: 30.9 PG — SIGNIFICANT CHANGE UP (ref 27–34)
MCHC RBC-ENTMCNC: 34 G/DL — SIGNIFICANT CHANGE UP (ref 32–36)
MCV RBC AUTO: 90.9 FL — SIGNIFICANT CHANGE UP (ref 80–100)
METHADONE UR-MCNC: NEGATIVE — SIGNIFICANT CHANGE UP
MONOCYTES # BLD AUTO: 0.47 K/UL — SIGNIFICANT CHANGE UP (ref 0–0.9)
MONOCYTES NFR BLD AUTO: 6.4 % — SIGNIFICANT CHANGE UP (ref 2–14)
NEUTROPHILS # BLD AUTO: 4.59 K/UL — SIGNIFICANT CHANGE UP (ref 1.8–7.4)
NEUTROPHILS NFR BLD AUTO: 62.7 % — SIGNIFICANT CHANGE UP (ref 43–77)
NRBC # BLD: 0 /100 WBCS — SIGNIFICANT CHANGE UP (ref 0–0)
OPIATES UR-MCNC: NEGATIVE — SIGNIFICANT CHANGE UP
PCP SPEC-MCNC: SIGNIFICANT CHANGE UP
PCP UR-MCNC: NEGATIVE — SIGNIFICANT CHANGE UP
PLATELET # BLD AUTO: 157 K/UL — SIGNIFICANT CHANGE UP (ref 150–400)
POTASSIUM SERPL-MCNC: 3.8 MMOL/L — SIGNIFICANT CHANGE UP (ref 3.5–5.3)
POTASSIUM SERPL-SCNC: 3.8 MMOL/L — SIGNIFICANT CHANGE UP (ref 3.5–5.3)
PROT SERPL-MCNC: 6.9 GM/DL — SIGNIFICANT CHANGE UP (ref 6–8.3)
RBC # BLD: 4.4 M/UL — SIGNIFICANT CHANGE UP (ref 4.2–5.8)
RBC # FLD: 12.7 % — SIGNIFICANT CHANGE UP (ref 10.3–14.5)
SALICYLATES SERPL-MCNC: 1.9 MG/DL — LOW (ref 2.8–20)
SARS-COV-2 RNA SPEC QL NAA+PROBE: SIGNIFICANT CHANGE UP
SODIUM SERPL-SCNC: 141 MMOL/L — SIGNIFICANT CHANGE UP (ref 135–145)
THC UR QL: NEGATIVE — SIGNIFICANT CHANGE UP
WBC # BLD: 7.35 K/UL — SIGNIFICANT CHANGE UP (ref 3.8–10.5)
WBC # FLD AUTO: 7.35 K/UL — SIGNIFICANT CHANGE UP (ref 3.8–10.5)

## 2022-11-07 PROCEDURE — 99285 EMERGENCY DEPT VISIT HI MDM: CPT

## 2022-11-07 PROCEDURE — 93010 ELECTROCARDIOGRAM REPORT: CPT

## 2022-11-07 RX ORDER — LITHIUM CARBONATE 300 MG/1
900 TABLET, EXTENDED RELEASE ORAL ONCE
Refills: 0 | Status: COMPLETED | OUTPATIENT
Start: 2022-11-07 | End: 2022-11-07

## 2022-11-07 RX ORDER — RISPERIDONE 4 MG/1
1 TABLET ORAL ONCE
Refills: 0 | Status: COMPLETED | OUTPATIENT
Start: 2022-11-07 | End: 2022-11-07

## 2022-11-07 RX ORDER — TRAZODONE HCL 50 MG
50 TABLET ORAL ONCE
Refills: 0 | Status: COMPLETED | OUTPATIENT
Start: 2022-11-07 | End: 2022-11-07

## 2022-11-07 RX ORDER — LITHIUM CARBONATE 300 MG/1
900 TABLET, EXTENDED RELEASE ORAL ONCE
Refills: 0 | Status: DISCONTINUED | OUTPATIENT
Start: 2022-11-07 | End: 2022-11-07

## 2022-11-07 RX ADMIN — Medication 50 MILLIGRAM(S): at 23:52

## 2022-11-07 RX ADMIN — LITHIUM CARBONATE 900 MILLIGRAM(S): 300 TABLET, EXTENDED RELEASE ORAL at 23:52

## 2022-11-07 RX ADMIN — RISPERIDONE 1 MILLIGRAM(S): 4 TABLET ORAL at 23:52

## 2022-11-07 NOTE — ED ADULT TRIAGE NOTE - CHIEF COMPLAINT QUOTE
Patient states he wants to commit suicide. Patient he does have plan he attempted to slit his wrist around 11 am today. Patient states he is hearing voices and their telling him to cut his wrists.   hx bi polar, depression

## 2022-11-07 NOTE — ED BEHAVIORAL HEALTH ASSESSMENT NOTE - HPI (INCLUDE ILLNESS QUALITY, SEVERITY, DURATION, TIMING, CONTEXT, MODIFYING FACTORS, ASSOCIATED SIGNS AND SYMPTOMS)
****THIS IS AN INCOMPLETE NOTE. ****PLAN HAS NOT BEEN FINALIZED BY ATTENDING. ****FULL NOTE TO FOLLOW SHORTLY. ****   47 year old man hx of bipolar disorder and depression who presents to the ER c/o feeling depressed, experiencing auditory and visual hallucinations and having suicidal thoughts.  Patient states he wants to kill himself his plan is to cut his wrist.  Patient was recently discharged from [facility] as a psych admission.  He denies alcohol and drug use.                  No psychiatric PRNs or acute behavioral events reported.     Chart reviewed, patient seen and examined in a private room on 1:1 usng a telepsychiatry cart. On approach, patient calm and cooperative to interview, no acute distress.       Patient denies suicidal ideation, intent, or plan at time of interview. No HI. Denies any prior NSSIB. Patient denies acute symptoms of timbo, PTSD, or psychosis at this time.     Patient denies recent use of alcohol, nicotine, marijuana, or any illicit substances.    Psychiatric history:   - prior psychiatric Dx: denies   - psychiatrist: denies   - therapist: denies   - prior psychiatric medications: denies   - prior inpatient psychiatric admissions: denies     Family psychiatric history: denies     Home medications: denies    Allergies (+rxn): denies     Social history:   - born in:   - highest education level:   - school/employment:     Legal history: denies    Collateral (relation: , #): ****THIS IS AN INCOMPLETE NOTE. ****PLAN HAS NOT BEEN FINALIZED BY ATTENDING. ****FULL NOTE TO FOLLOW SHORTLY. ****       48 yo  male, domiciled in sober house x50 days,  with 3 children (ages 15, 14, 3, 4y/o lives at home with current wife, older two live with his ex wife), works as a  at [employer] in [employer] dept. Medical history significant for atrial fibrillation, HTN and GERD. Psychiatric history of bipolar disorder, history of psychiatric hospitalizations, most recent in 2013 following suicide attempt by cutting his wrists. Hx of numerous detox/ rehabs (alcohol/cocaine), currently in sober house x50 days. In treatment recently with Psychiatrist and therapist at [facility]. No legal history, no trauma history. Patient presents to  ED accompanied by a friend with c/o of SI with plan to slit his wrists. Transferred to [facility] voluntary status.       Patient states that he has been free of substances and alcohol x51 days, following detox at [facility] and transition to sober house. Substances of choice are etoh and cocaine. He has been using substances since 8/9th grade. He reports having diagnoses of Bipolar but stopped taking his medications years ago. He was recently started on prozac x1 month with minimal effect on mood. SI has been worsening x2 weeks with him recently attempting to cut his wrist with a sharpened pen cap. A friend was very concerned about him and pt disclosed how he was feeling. Friend encouraged him to go to hospital for help. Poor sleep and appetite since going to the sober house, losing an estimated 15lbs and averaging 3 hours each night. No a/vh but has had in the past while intoxicated. No paranoia. No hi.                 No psychiatric PRNs or acute behavioral events reported.     Chart reviewed, patient seen and examined in a private room on 1:1 usng a telepsychiatry cart. On approach, patient calm and cooperative to interview, no acute distress.       Patient denies suicidal ideation, intent, or plan at time of interview. No HI. Denies any prior NSSIB. Patient denies acute symptoms of timbo, PTSD, or psychosis at this time.     Patient denies recent use of alcohol, nicotine, marijuana, or any illicit substances.    Psychiatric history:   - prior psychiatric Dx: denies   - psychiatrist: denies   - therapist: denies   - prior psychiatric medications: denies   - prior inpatient psychiatric admissions: denies     Family psychiatric history: denies     Home medications: denies    Allergies (+rxn): denies     Social history:   - born/raised in: [place]  - highest education level: 7th grade     Legal history: denies    Collateral (relation: , #): ****THIS IS AN INCOMPLETE NOTE. ****PLAN HAS NOT BEEN FINALIZED BY ATTENDING. ****FULL NOTE TO FOLLOW SHORTLY. ****     46 yo  male, domiciled in sober house,  with 3 children (ages 15, 14, 3, 4y/o lives at home with current wife, older two live with his ex wife), works as a  at [employer] in [place] dept, PMH of atrial fibrillation, HTN and GERD, psychiatric hx of bipolar disorder, history of multiple prior psychiatric hospitalizations, recently discharged from (previously on in 2013 following suicide attempt by cutting his wrists. Hx of numerous detox/ rehabs (alcohol/cocaine), currently in sober house x50 days. In treatment recently with Psychiatrist and therapist at [facility]. No legal history, no trauma history. Patient presents to  ED accompanied by a friend with c/o of SI with plan to slit his wrists.       Patient states that he has been free of substances and alcohol x51 days, following detox at [facility] and transition to sober house. Substances of choice are etoh and cocaine. He has been using substances since 8/9th grade. He reports having diagnoses of Bipolar but stopped taking his medications years ago. He was recently started on prozac x1 month with minimal effect on mood. SI has been worsening x2 weeks with him recently attempting to cut his wrist with a sharpened pen cap. A friend was very concerned about him and pt disclosed how he was feeling. Friend encouraged him to go to hospital for help. Poor sleep and appetite since going to the sober house, losing an estimated 15lbs and averaging 3 hours each night. No a/vh but has had in the past while intoxicated. No paranoia. No hi.                 No psychiatric PRNs or acute behavioral events reported.     Chart reviewed, patient seen and examined in a private room on 1:1 usng a telepsychiatry cart. On approach, patient calm and cooperative to interview, no acute distress.       Patient denies suicidal ideation, intent, or plan at time of interview. No HI. Denies any prior NSSIB. Patient denies acute symptoms of timbo, PTSD, or psychosis at this time.     Patient denies recent use of alcohol, nicotine, marijuana, or any illicit substances.    Psychiatric history:   - prior psychiatric Dx: denies   - psychiatrist: denies   - therapist: denies   - prior psychiatric medications: denies   - prior inpatient psychiatric admissions: denies     Family psychiatric history: denies     Home medications: denies    Allergies (+rxn): denies     Social history:   - born/raised in: [place]  - highest education level: 7th grade     Legal history: denies    Collateral (relation: , #): ****THIS IS AN INCOMPLETE NOTE. ****PLAN HAS NOT BEEN FINALIZED BY ATTENDING. ****FULL NOTE TO FOLLOW SHORTLY. ****     46 yo  male, domiciled in sober house,  with 3 children (ages 15, 14, 3, 2y/o lives at home with current wife, older two live with his ex wife), works as a  at [employer] in [place] de[employer], PMH of atrial fibrillation, HTN and GERD, psychiatric hx of bipolar disorder, history of multiple prior psychiatric hospitalizations, recently discharged from (previously on in 2013 following suicide attempt by cutting his wrists. Hx of numerous detox/ rehabs (alcohol/cocaine), currently in sober house x50 days. In treatment recently with Psychiatrist and therapist at [facility]. No legal history, no trauma history. Patient presents to  ED accompanied by a friend with c/o of SI with plan to slit his wrists.       Patient states that he has been free of substances and alcohol x51 days, following detox at [facility] and transition to sober house. Substances of choice are etoh and cocaine. He has been using substances since 8/9th grade. He reports having diagnoses of Bipolar but stopped taking his medications years ago. He was recently started on prozac x1 month with minimal effect on mood. SI has been worsening x2 weeks with him recently attempting to cut his wrist with a sharpened pen cap. A friend was very concerned about him and pt disclosed how he was feeling. Friend encouraged him to go to hospital for help. Poor sleep and appetite since going to the sober house, losing an estimated 15lbs and averaging 3 hours each night. No a/vh but has had in the past while intoxicated. No paranoia. No hi.                 No psychiatric PRNs or acute behavioral events reported.     Chart reviewed, patient seen and examined in a private room on 1:1 usng a telepsychiatry cart. On approach, patient calm and cooperative to interview, no acute distress.       Patient denies suicidal ideation, intent, or plan at time of interview. No HI. Denies any prior NSSIB. Patient denies acute symptoms of timbo, PTSD, or psychosis at this time.     Patient denies recent use of alcohol, nicotine, marijuana, or any illicit substances.    Psychiatric history:   - prior psychiatric Dx: denies   - psychiatrist: denies   - therapist: denies   - prior psychiatric medications: denies   - prior inpatient psychiatric admissions: denies     Family psychiatric history: denies     Home medications: denies    Allergies (+rxn): denies     Social history:   - born/raised in: [place]  - highest education level: 7th grade     Legal history: denies    Collateral:   - Discussed with patient's sister, Melonie [phone]. States that she spoke with pt yesterday, and that yesterday pt seemed to be in a good mood. States that pt told her that he has an appointment with his outpt psychiatrist today. Also states that pt told her today that he hopes to be admitted to inpatient psychiatry. Denies acute safety concerns - states that patient has told her "I'm just going to end it all" in the past, but she states that this appeared to "be a cry for help."  - Discussed with [facility] in psychiatrist Dr. Apple. States that pt endorsed CAH, as well as claimed that he had a visual hallucination of having a "doppleganger" that pt claimed to be able to see. Reports that pt admitted to lying about his symptoms to psychiaty team when patient wanted to be discharged from the [facility]. Concern for possible secondary gain, potentially involving housing situation, but unclear. 48 yo  male, domiciled in sober house,  with 3 children (ages 15, 14, 3, 4y/o lives at home with current wife, older two live with his ex wife), works as a  at [employer] in [place] de[employer], PMH of atrial fibrillation, HTN and GERD, psychiatric hx of bipolar disorder, history of multiple prior psychiatric hospitalizations, recently discharged from [facility] (10/24/22-11/3/22; previously on in  following suicide attempt by cutting his wrists), Hx of numerous detox/ rehabs (alcohol/cocaine), recently sober house, denies any substance use in 64 days. In treatment recently with Psychiatrist and therapist at [facility]. No legal history, no trauma history. Patient presents to  ED due to complaint of CAH and SI with plan to slit his wrists in setting of recent interrupted attempt to cut his wrist with a safety razor.     No psychiatric PRNs or acute behavioral events reported. Chart reviewed, patient seen and examined in a private room on 1:1 using a telepsychiatry cart. On approach, patient calm and cooperative to interview, anxious-appearing, very polte. States hat he is here because "I want to commit suicide" and has reportedly been having these thoughts for "about 30 years." Patient states that he was recently discharged from [facility] for dx of bipolar disorder. Patient states that he has been having CAH (a voice that sounds like his own) since discharge, as well as AH of a doppleganger that looks like himself. States that he has been having these symptoms since he went sober off of EtOH and cocaine. Patient claims that he was doing well on the IPP unit, but states that he pushed for an early discharge, which in retrospect, he feels was premature. Patient states that since he has been discharged, he has been having worsening thoughts of self-harm, and that his mood has been unstable. States that today he cut his wrist with a safety razor in the setting of hearing CAH with his own voice telling him to hurt himself, and that he intended to kill himself, but was stopped by a friend. States that today he had his initial appointment with a new psychiatrist with [facility] psychiatry and that the psychiatrist referred him to the hospital for safety concerns. States that he arrived at [facility] today, was evaluated, but discharged. Patient states that he came in to [facility] seeking admission to inpatient psychiatry unit because he feels that his medications may need to be adjusted. States that he has been compliant with his medications since his discharge from the unit. Patient endorses active suicidal ideation, intent, or plan at time of interview. No HI. Endorses prior NSSIB via cutting. Patient endorses some acute symptoms of depression: sleeplessness, guilt/worthlessness,  energy, impaired concentration,  appetite, and PMA). Endorses some acute symptoms of timbo: distractibility, significant energy level, increased activity, sleeplessness endorses recently talkativeness. Endorses symptoms anxiety, including feeling of restlessness as well as fidgeting throughout interview, Patient endorses symptoms of psychosis on interview - reports he is hearing CAH at time of exam to cause pain to himself (but not kill himself), and endorses seeing "bugs," similar to gnats, at time of exam. Denies acute symptoms of PTSD.     Substances:   - Patient denies recent use of alcohol, nicotine, marijuana, or any illicit substances over the past 64 days  - EtOH: endorses hx of 4L/day of hard liquor for 6 years  - cocaine: endorses snorting "6-10 grams" of cocaine daily for 6 years   - denies other recent substance use; remote hx of marijuana and mushrooms    Psychiatric history:   - prior psychiatric Dx: bipolar disorder   - psychiatrist: [facility], reports he had his first appointment with a new psychiatrist today  - therapist: keith Knight  - prior psychiatric medications: extensive  - prior inpatient psychiatric admissions: endorses ~5 prior admissions, most recently at [place]  - current medications: lithium 900mg, trazodone 50 mg qhs, risperdal 1mg bid     Family psychiatric history: denies     Home medications:  - endorses he is on a medication for HTN, does not remember details     Allergies (+rxn): shellfish     Social history:   - born/raised in: [place]  - highest education level: 7th grade     Legal history: denies    Collateral:   - Discussed with patient's sister, Melonie [phone]. States that she spoke with pt yesterday, and that yesterday pt seemed to be in a good mood. States that pt told her that he has an appointment with his outpt psychiatrist today. Also states that pt told her today that he hopes to be admitted to inpatient psychiatry. Denies acute safety concerns - states that patient has told her "I'm just going to end it all" in the past, but she states that this appeared to "be a cry for help."  - Discussed with [facility] inpt psychiatrist Dr. Apple. States that pt endorsed CAH, as well as claimed that he had a visual hallucination of having a "doppleganger" that pt claimed to be able to see. Reports that pt admitted to lying about his symptoms to psychiatry team when patient wanted to be discharged from the [facility]. Concern for possible secondary gain, potentially involving housing situation, but unclear.

## 2022-11-07 NOTE — ED BEHAVIORAL HEALTH ASSESSMENT NOTE - PSYCHIATRIST
Reports he had his first appointment with a new psychiatrist - works for [employer] [facility] [address] [place] [address]

## 2022-11-07 NOTE — ED BEHAVIORAL HEALTH ASSESSMENT NOTE - NSBHATTESTCOMMENTATTENDFT_PSY_A_CORE
This is a 48 yo male, domiciled in sober house, with PPH alcohol and cocaine use disorders in partial remission (sober 2 months), bipolar disorder w/ psychotic features, recently discharged from [facility] 4d ago after voluntary admission, self-presenting w/ SI and stating intent to cut himself. He is reporting that he was stopped by a friend while cutting himself w/ a razor he purchased from a store. No major laceration observable on tele assessment. Patient reports chronic depression and chronic urges to harm himself to escape mental "pain," and endorses wish to be dead. He reports not feeling safe w/ himself and requests psych admission. Cannot rule out malingering given recent discharge and collateral from inpatient psychiatrist, patient's report of unusual visual hallucinations of himself, and his consistent reporting of a similar story of suicidal behavior being interrupted by a friend; however, also likely is that patient is experiencing real persistent dysphoria 2/2 substance induced mood disorder and 2/2 chronic poor coping skills which is leading to his seeking support from an inpatient setting. Of note, on previous admission he requested discharge after eventually feeling "bored." Given patient is unable to contract for safety and there is not enough evidence to support a malingering presentation, will admit patient voluntarily for further assessment & possible med adjustment (as requested by the patient).

## 2022-11-07 NOTE — ED BEHAVIORAL HEALTH ASSESSMENT NOTE - OTHER
deferred [facility] ([address] [facility]) Hold for bed sober living house pt endorses he is currently / from he second wife

## 2022-11-07 NOTE — ED PROVIDER NOTE - PROGRESS NOTE DETAILS
Telepsych called and made aware Psych evaluated patient and he is to be voluntary admission, bed obtained at [facility], patient consents to and is stable for transfer to psych bed, voluntary legals completed. PM meds given per psych rec.

## 2022-11-07 NOTE — ED ADULT NURSE NOTE - NS TRANSFER PATIENT BELONGINGS
SIGN IN:  Correct Patient: YES  Correct Site: YES  Correct laterality: N/A  Correct Procedure: YES  Correct Position: YES  Site Marked: N/A  Allergies Reviewed: YES  Patient Introduced to staff in room: YES    
Cell Phone/PDA (specify)/Other belongings/Clothing

## 2022-11-07 NOTE — ED PROVIDER NOTE - CLINICAL SUMMARY MEDICAL DECISION MAKING FREE TEXT BOX
Patient with depression suicidal just recently released for psych admission.  Will check labs and consult psychiatry.

## 2022-11-07 NOTE — ED BEHAVIORAL HEALTH NOTE - BEHAVIORAL HEALTH NOTE
==================  PRE-HOSPITAL COURSE  ===================  SOURCE:  Secondhand ED documentation & ED provider report  DETAILS: Patient self-presented to the ED due to experiencing SI with thoughts of wanting to cut, and non-command AH. Patient reports recently self-harming via puncturing.     =========  ED COURSE  =========  BEHAVIOR: Patient was described to be currently calm and cooperative presenting with linear thought process and thought content WNL, is AAAOx4, reports SI with thoughts of cutting, reports non-command AH, denies HI/AVH, remains in good behavioral control, is not violent/aggressive, presents with a small superficial puncture to the left wrist that patient reported to the RN was self-inflicted. RN states patient appears to be well-groomed.  TREATMENT:  None  VISITORS: None

## 2022-11-07 NOTE — ED BEHAVIORAL HEALTH ASSESSMENT NOTE - SUMMARY
45 yo M, domiciled in sober house,  from wife, has 3 children (ages 15, 14, 3), works as a , medical history of atrial fibrillation, psychiatric history of bipolar disorder, history of psychiatric hospitalizations, most recent in 2013 following suicide attempt by cutting his wrists, just started seeing a provider at [facility], substance use history of alcohol and cocaine, no legal history, no trauma history, who presents BIB self accompanied by a friend for SI with plan to slit his wrists. Psychiatry consulted for safety assessment.    The patient presents with depressed mood, hopelessness, and intrusive suicidal thoughts with plan and intent to slit his wrists since last week. He made an attempt to cut his wrists with a sharpened pen cap tonight and was interrupted by his friend and peer who walked into the room stopping the patient. Protective factors include patient being engaged in work, connected to outpatient treatment, and responsibility to family members. There is a history of a prior suicide attempt by cutting his wrists in 2013 and there has been a long period of relative mental health stability though extensive substance use prior to today's presentation, is newly connected to outpatient mental health treatment, and outpatient provider increased the Prozac which coincides with start of patient's episode of suicidal thoughts all of which acutely elevates patient's risk of suicide. Additional stressors are adjusting to sober living and being  from his family. The patient is unable to safety plan, unable to identify reasons to live or identify future plans. The patient meets involuntary criteria at this time, requiring psychiatric hospitalization for safety, stabilization, and medication management. The patient is willing to sign in and recommend reassessment if patient requests to leave.    PLAN:    - Hold for bed, 9.13  - Call psychiatry to reassess if patient requests discharge  - Discontinue Prozac 45 yo M, domiciled in sober house,  from wife, has 3 children (ages 15, 14, 3), works as a , medical history of atrial fibrillation, psychiatric history of bipolar disorder, history of psychiatric hospitalizations, most recent in 2013 following suicide attempt by cutting his wrists, just started seeing a provider at [facility], substance use history of alcohol and cocaine, no legal history, no trauma history, who presents BIB self accompanied by a friend for SI with plan to slit his wrists. Psychiatry consulted for safety assessment.    The patient presents with depressed mood, hopelessness, and intrusive suicidal thoughts with plan and intent to slit his wrists since last week.    Additional stressors are adjusting to sober living and being  from his family. The patient is unable to safety plan, unable to identify reasons to live or identify future plans. The patient meets involuntary criteria at this time, requiring psychiatric hospitalization for safety, stabilization, and medication management.     Patient would benefit from inpatient psychiatry admission for safety, medication management, and symptom remission. Admit to inpatient psychiatry on 9.13 legal status (voluntary) once medically cleared  - COVID PCR  - EKG   -For severe agitation not responding to behavioral intervention, may give haldol 5 mg po q6h prn, ativan 2 mg po q6h prn, Benadryl 50 mg po q6h prn, with escalation to IM if patient refusing PO and remains an imminent danger to self or others. If IM antipsychotic is administered, please perform follow-up ECG for QTc monitoring (<500). 46 yo M, domiciled in sober house,  from wife, has 3 children (ages 15, 14, 3), works as a , medical history of atrial fibrillation, psychiatric history of bipolar disorder, history of psychiatric hospitalizations, most recent in 2013 following suicide attempt by cutting his wrists, just started seeing a provider at [facility], substance use history of alcohol and cocaine, no legal history, no trauma history, who presents BIB self accompanied by a friend for SI with plan to slit his wrists. Psychiatry consulted for safety assessment.    The patient presents with depressed mood, hopelessness, and intrusive suicidal thoughts with plan and intent to slit his wrists since last week. Additional stressors are adjusting to sober living and being  from his family. The patient is unable to safety plan, unable to identify reasons to live or identify future plans.  Patient would benefit from inpatient psychiatry admission for safety, medication management, and symptom remission. Admit to inpatient psychiatry on voluntary status once medically cleared    Impression:   - bipolar II disorder  - hx cocaine use disorder, severe  - hx EtOH use disorder, severe     Recommendations:   - offer pt 9.13 to [facility]; otherwise, hold for bed   - in ED, give Risperdal 1mg x1 STAT, lithium 900 mg x1 STAT, and trazodone 50 mg x1 STAT   - continue home medications: Risperdal 1mg bid, lithium 900 mg qhs, and trazodone 50 mg qhs   - obtain lithium levels  - COVID PCR  - EKG   - For severe agitation not responding to behavioral intervention, may give haldol 5 mg po q6h prn, ativan 2 mg po q6h prn, Benadryl 50 mg po q6h prn, with escalation to IM if patient refusing PO and remains an imminent danger to self or others. If IM antipsychotic is administered, please perform follow-up ECG for QTc monitoring (<500). 48 yo M, domiciled in sober house,  from wife, has 3 children (ages 15, 14, 3), works as a , medical history of atrial fibrillation, psychiatric history of bipolar disorder, history of psychiatric hospitalizations, most recent in 2013 following suicide attempt by cutting his wrists, just started seeing a provider at [facility], substance use history of alcohol and cocaine, no legal history, no trauma history, who presents BIB self accompanied by a friend for SI with plan to slit his wrists. Psychiatry consulted for safety assessment.    The patient presents with depressed mood, hopelessness, and intrusive suicidal thoughts with plan and intent to slit his wrists x7. Endorses that he has been having new onset CAH since the onset of his discontinuation from EtOH and cocaine use 2 months ago. Patient states that he tried to cut his wrists to end his life today but that a friend stopped him. Patient states that he was referred by his outpt psychiatrist to come into the ED due to "safety concerns." Some concerns for secondary gain given that pt endorsed that he lied to psychiatry team at [place] in order to get discharged from the unit faster, and corroborated by inpt attending at [place]. However, patient is unable to safety plan, unable to identify reasons to live or identify future plans. Cannot rule out significant risk factors including impulsive behavior. Currently endorsing active intent and plan to end his life. Patient would benefit from inpatient psychiatry admission for safety, medication management, and symptom remission. Admit to inpatient psychiatry on voluntary status once medically cleared    Impression:   - bipolar II disorder  - hx cocaine use disorder, severe  - hx EtOH use disorder, severe   - r/o possible secondary gain     Recommendations:   - offer pt 9.13 to [facility] I[facility]; otherwise, hold for bed   - in ED, give Risperdal 1mg x1 STAT, lithium 900 mg x1 STAT, and trazodone 50 mg x1 STAT   - continue home medications: Risperdal 1mg bid, lithium 900 mg qhs, and trazodone 50 mg qhs   - obtain lithium levels  - COVID PCR  - EKG   - For severe agitation not responding to behavioral intervention, may give haldol 5 mg po q6h prn, ativan 2 mg po q6h prn, Benadryl 50 mg po q6h prn, with escalation to IM if patient refusing PO and remains an imminent danger to self or others. If IM antipsychotic is administered, please perform follow-up ECG for QTc monitoring (<500). 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 (10/24/22-11/3/22; previously on in 2013 following suicide attempt by cutting his wrists), Hx of numerous detox/ rehabs (alcohol/cocaine), recently sober house, denies any substance use in 64 days. In treatment recently with Psychiatrist and therapist at [facility]. No legal history, no trauma history. Patient presents to  ED due to complaint of CAH and SI with plan to slit his wrists in setting of recent interrupted attempt to cut his wrist with a safety razor.       The patient presents with depressed mood, hopelessness, and intrusive suicidal thoughts with plan and intent to slit his wrists x7. Endorses that he has been having new onset CAH since the onset of his discontinuation from EtOH and cocaine use 2 months ago. Patient states that he tried to cut his wrists to end his life today but that a friend stopped him. Patient states that he was referred by his outpt psychiatrist to come into the ED due to "safety concerns." Some concerns for secondary gain given that pt endorsed that he lied to psychiatry team at [place] in order to get discharged from the unit faster, and corroborated by inpt attending at [place]. However, patient is unable to safety plan, unable to identify reasons to live or identify future plans. Cannot rule out significant risk factors including impulsive behavior. Currently endorsing active intent and plan to end his life. Patient would benefit from inpatient psychiatry admission for safety, medication management, and symptom remission. Admit to inpatient psychiatry on voluntary status once medically cleared    Impression:   - bipolar II disorder  - hx cocaine use disorder, severe  - hx EtOH use disorder, severe   - r/o possible secondary gain     Recommendations:   - offer pt 9.13 to [facility] IPP; otherwise, hold for bed   - in ED, give Risperdal 1mg x1 STAT, lithium 900 mg x1 STAT, and trazodone 50 mg x1 STAT   - continue home medications: Risperdal 1mg bid, lithium 900 mg qhs, and trazodone 50 mg qhs   - obtain lithium levels  - COVID PCR  - EKG   - For severe agitation not responding to behavioral intervention, may give haldol 5 mg po q6h prn, ativan 2 mg po q6h prn, Benadryl 50 mg po q6h prn, with escalation to IM if patient refusing PO and remains an imminent danger to self or others. If IM antipsychotic is administered, please perform follow-up ECG for QTc monitoring (<500).

## 2022-11-07 NOTE — ED ADULT NURSE NOTE - NSIMPLEMENTINTERV_GEN_ALL_ED
Implemented All Universal Safety Interventions:  [place] to call system. Call bell, personal items and telephone within reach. Instruct patient to call for assistance. Room bathroom lighting operational. Non-slip footwear when patient is off stretcher. Physically safe environment: no spills, clutter or unnecessary equipment. Stretcher in lowest position, wheels locked, appropriate side rails in place.

## 2022-11-07 NOTE — ED ADULT NURSE NOTE - OBJECTIVE STATEMENT
Patient alert and oriented x4. States he was here couple weeks ago and was brought o [facility], thinks he was discharged too early. He got more depressed and hearing voices. Patient states he stays at a shelter, he hit rock bottom today and started cutting his wrist. Small cut noted to left wrist area, patient states he used a razor blade to try cutting his hand. Continuous monitoring in place.

## 2022-11-07 NOTE — ED BEHAVIORAL HEALTH ASSESSMENT NOTE - DESCRIPTION
as per HPI · HPI Objective Statement: This patient is a 47 year old man hx of bipolar disorder and depression who presents to the ER c/o feeling depressed, experiencing auditory and visual hallucinations and having suicidal thoughts.  Patient states he wants to kill himself his plan is to cut his wrist.  Patient was recently discharged from [facility] as a psych admission.  He denies alcohol and drug use. Atrial fibrillation

## 2022-11-07 NOTE — ED BEHAVIORAL HEALTH ASSESSMENT NOTE - NSBHADMITCOUNSEL_PSY_A_CORE
diagnostic results/impressions and/or recommended studies/risks and benefits of treatment options/instructions for management, treatment and follow up/importance of adherence to chosen treatment/client/family/caregiver education/prognosis

## 2022-11-07 NOTE — ED ADULT NURSE REASSESSMENT NOTE - NS ED NURSE REASSESS COMMENT FT1
PT received awake and alert x 3 speaking in full sentences. soft spoken. endorses hx of depression and bipolar disease. States he has been having suicidal ideations of wishing to cut his wrist. Feeling like he is not being taken care of properly. Pt was received in single room, belongings taken and placed on 1:1. Surroundings checked and safety maintained.  Pt provided blanket for comfort. pending tele psych evaluation.

## 2022-11-08 ENCOUNTER — INPATIENT (INPATIENT)
Facility: HOSPITAL | Age: 47
LOS: 7 days | Discharge: HOME | End: 2022-11-16
Attending: PSYCHIATRY & NEUROLOGY | Admitting: PSYCHIATRY & NEUROLOGY

## 2022-11-08 VITALS
SYSTOLIC BLOOD PRESSURE: 149 MMHG | DIASTOLIC BLOOD PRESSURE: 77 MMHG | TEMPERATURE: 98 F | RESPIRATION RATE: 18 BRPM | HEART RATE: 73 BPM | OXYGEN SATURATION: 97 %

## 2022-11-08 VITALS
RESPIRATION RATE: 20 BRPM | HEART RATE: 66 BPM | SYSTOLIC BLOOD PRESSURE: 119 MMHG | HEIGHT: 74 IN | WEIGHT: 237 LBS | DIASTOLIC BLOOD PRESSURE: 90 MMHG

## 2022-11-08 DIAGNOSIS — F33.9 MAJOR DEPRESSIVE DISORDER, RECURRENT, UNSPECIFIED: ICD-10-CM

## 2022-11-08 DIAGNOSIS — F33.1 MAJOR DEPRESSIVE DISORDER, RECURRENT, MODERATE: ICD-10-CM

## 2022-11-08 DIAGNOSIS — F41.9 ANXIETY DISORDER, UNSPECIFIED: ICD-10-CM

## 2022-11-08 PROCEDURE — 99232 SBSQ HOSP IP/OBS MODERATE 35: CPT

## 2022-11-08 RX ORDER — LITHIUM CARBONATE 300 MG/1
900 TABLET, EXTENDED RELEASE ORAL AT BEDTIME
Refills: 0 | Status: DISCONTINUED | OUTPATIENT
Start: 2022-11-08 | End: 2022-11-16

## 2022-11-08 RX ORDER — LORATADINE 10 MG/1
10 TABLET ORAL DAILY
Refills: 0 | Status: DISCONTINUED | OUTPATIENT
Start: 2022-11-08 | End: 2022-11-16

## 2022-11-08 RX ORDER — OLANZAPINE 15 MG/1
2.5 TABLET, FILM COATED ORAL AT BEDTIME
Refills: 0 | Status: DISCONTINUED | OUTPATIENT
Start: 2022-11-08 | End: 2022-11-09

## 2022-11-08 RX ORDER — ALBUTEROL 90 UG/1
2 AEROSOL, METERED ORAL EVERY 6 HOURS
Refills: 0 | Status: DISCONTINUED | OUTPATIENT
Start: 2022-11-08 | End: 2022-11-16

## 2022-11-08 RX ORDER — LOSARTAN POTASSIUM 100 MG/1
50 TABLET, FILM COATED ORAL DAILY
Refills: 0 | Status: DISCONTINUED | OUTPATIENT
Start: 2022-11-08 | End: 2022-11-16

## 2022-11-08 RX ORDER — NICOTINE POLACRILEX 2 MG
1 GUM BUCCAL DAILY
Refills: 0 | Status: DISCONTINUED | OUTPATIENT
Start: 2022-11-08 | End: 2022-11-16

## 2022-11-08 RX ORDER — LITHIUM CARBONATE 300 MG/1
900 TABLET, EXTENDED RELEASE ORAL AT BEDTIME
Refills: 0 | Status: DISCONTINUED | OUTPATIENT
Start: 2022-11-08 | End: 2022-11-08

## 2022-11-08 RX ORDER — RISPERIDONE 4 MG/1
1 TABLET ORAL
Refills: 0 | Status: DISCONTINUED | OUTPATIENT
Start: 2022-11-08 | End: 2022-11-08

## 2022-11-08 RX ORDER — GABAPENTIN 400 MG/1
300 CAPSULE ORAL THREE TIMES A DAY
Refills: 0 | Status: DISCONTINUED | OUTPATIENT
Start: 2022-11-08 | End: 2022-11-10

## 2022-11-08 RX ORDER — HALOPERIDOL DECANOATE 100 MG/ML
5 INJECTION INTRAMUSCULAR EVERY 6 HOURS
Refills: 0 | Status: DISCONTINUED | OUTPATIENT
Start: 2022-11-08 | End: 2022-11-10

## 2022-11-08 RX ORDER — TRAZODONE HCL 50 MG
50 TABLET ORAL AT BEDTIME
Refills: 0 | Status: DISCONTINUED | OUTPATIENT
Start: 2022-11-08 | End: 2022-11-16

## 2022-11-08 RX ORDER — PANTOPRAZOLE SODIUM 20 MG/1
40 TABLET, DELAYED RELEASE ORAL
Refills: 0 | Status: DISCONTINUED | OUTPATIENT
Start: 2022-11-08 | End: 2022-11-16

## 2022-11-08 RX ADMIN — GABAPENTIN 300 MILLIGRAM(S): 400 CAPSULE ORAL at 12:22

## 2022-11-08 RX ADMIN — OLANZAPINE 2.5 MILLIGRAM(S): 15 TABLET, FILM COATED ORAL at 20:18

## 2022-11-08 RX ADMIN — GABAPENTIN 300 MILLIGRAM(S): 400 CAPSULE ORAL at 20:18

## 2022-11-08 RX ADMIN — Medication 2 MILLIGRAM(S): at 12:22

## 2022-11-08 RX ADMIN — LITHIUM CARBONATE 900 MILLIGRAM(S): 300 TABLET, EXTENDED RELEASE ORAL at 20:18

## 2022-11-08 RX ADMIN — HALOPERIDOL DECANOATE 5 MILLIGRAM(S): 100 INJECTION INTRAMUSCULAR at 12:22

## 2022-11-08 RX ADMIN — Medication 2 MILLIGRAM(S): at 21:10

## 2022-11-08 NOTE — BH SOCIAL WORK CONFIRMATION FOLLOW UP NOTE - NSCOMMENTS_PSY_ALL_CORE
Linkage call made to office above. NOLA spoke with Whitney who stated pt attending his appointment above with f/u scheduled for 11/19. Caring call attempted, no answer. Per Veronique, pt readmitted to [facility].

## 2022-11-08 NOTE — PATIENT PROFILE BEHAVIORAL HEALTH - FALL HARM RISK - ATTEMPT OOB
8 y male, h.o seasonal allergies, was seen by pmd 3 days ago and started on a few medicines for allergies but mom does not know name, started with cough since yesterday and today had shortness of breath, with low grade temp. +PND. No ear pain / throat pain. Eating/drinking well. No h/o asthma. Vaccines up to date. No

## 2022-11-08 NOTE — PROGRESS NOTE BEHAVIORAL HEALTH - NSBHFUPADDHPIFT_PSY_A_CORE
Patient is a 47 years of age male who was transferred from [facility] after evaluation of suicide ideation and psychosis. At the time of assessment in the emergency department, patient reported command auditory hallucination and suicidal ideation with plan to slit his wrists in setting of recent interrupted attempt to cut his wrist with a safety razor. Patient states that he was recently discharged from [facility] for dx of bipolar disorder. Since discharge, pt reports having CAH (a voice that sounds like his own), VH of a doppleganger, worsening thoughts of self-harm and unstable mood. He reported that he has been having these symptoms since he went sober off of EtOH and cocaine. He stated that today he cut his wrist with a safety razor in the setting of hearing CAH with his own voice telling him to hurt himself, and that he intended to kill himself, but was stopped by a friend. Patient endorsed some acute symptoms of depression (sleeplessness, guilt/worthlessness,  energy, impaired concentration,  appetite, and PMA), some acute symptoms of timbo (distractibility, significant energy level, increased activity, sleeplessness endorses recently talkativeness), symptoms anxiety(restlessness and fidgeting throughout the interview), and symptoms of psychosis (hearing CAH at time of exam to cause pain to himself but not kill himself and seeing "bugs," similar to gnats at time of exam). Denies acute symptoms of PTSD.        Prior to admission, patient was domiciled in a sober house. He is  and has 3 children. He is employed as a  at [employer] in [employer]. Patient has history of multiple inpatient psychiatric admissions last in 10/24/22-11/3/22 at [facility]. Patient has history of suicide attempts by cutting wrist and a history of numerous detox/ rehabs (alcohol/cocaine). Patient denies any substance use in 64 days. He attended his first outpatient psychiatric appointment at [facility] prior to admission. Patient admitted to [facility] for treatment safety and observation.           Sexual History – Patient identifies as heterosexual.       Family relationships and history – Patient is  and has 3 children (15, 14, 3). The 4y/o lives at home with current wife and older two children live with his ex-wife.        Leisure Activity Assessment – Unable to assess.      Community Supports – Unable to assess.       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      Substance Use Assessment – Patient denies recent use of alcohol, nicotine, marijuana, or any illicit substances over the past 64 days. Patient endorsed hx of 4L/day of hard liquor for 6 years, snorting "6-10 grams" of cocaine daily for 6 years, and remote hx of marijuana and mushrooms.      History of suicidality or self- injurious behaviors – History of suicide attempt by cutting wrist and history of NSSIB. "Patient is a 47 years of age male who was transferred from [facility] after evaluation of suicide ideation and psychosis. At the time of assessment in the emergency department, patient reported command auditory hallucination and suicidal ideation with plan to slit his wrists in setting of recent interrupted attempt to cut his wrist with a safety razor. Patient states that he was recently discharged from [facility] for dx of bipolar disorder. Since discharge, pt reports having CAH (a voice that sounds like his own), VH of a doppelganger, worsening thoughts of self-harm and unstable mood. He reported that he has been having these symptoms since he went sober off of EtOH and cocaine. He stated that today he cut his wrist with a safety razor in the setting of hearing CAH with his own voice telling him to hurt himself, and that he intended to kill himself, but was stopped by a friend. Patient endorsed some acute symptoms of depression (sleeplessness, guilt/worthlessness,  energy, impaired concentration,  appetite, and PMA), some acute symptoms of timbo (distractibility, significant energy level, increased activity, sleeplessness endorses recently talkativeness), symptoms anxiety(restlessness and fidgeting throughout the interview), and symptoms of psychosis (hearing CAH at time of exam to cause pain to himself but not kill himself and seeing "bugs," similar to gnats at time of exam). Denies acute symptoms of PTSD."   He attended his first outpatient psychiatric appointment at [facility].  History of suicidality or self- injurious behaviors – History of suicide attempt by cutting wrist

## 2022-11-08 NOTE — PROGRESS NOTE BEHAVIORAL HEALTH - NSBHADDHXSUBSTFT_PSY_A_CORE
Patient denies recent use of alcohol, nicotine, marijuana, or any illicit substances over the past 64 days. Patient endorsed hx of 4L/day of hard liquor for 6 years, snorting "6-10 grams" of cocaine daily for 6 years, and remote hx of marijuana and mushrooms. Also, he states he smokes 2 to 2 and 1/2 PPD of cigarettes since he was 17 years old. History of numerous detox/ rehabs (alcohol/cocaine). Patient denies any substance use in 64 days.

## 2022-11-08 NOTE — ED ADULT NURSE REASSESSMENT NOTE - NS ED NURSE REASSESS COMMENT FT1
Received 47yr old male patient in room 41D. Patient is A&Ox3, breathing even and unlabored breaths. Pt is awaiting transport to [facility] psych unit. Report given from previous shift. V/S stable, no acute distress noted or verbalized at this time. Will continue to monitor.

## 2022-11-08 NOTE — PROGRESS NOTE BEHAVIORAL HEALTH - NSBHADDHXPSYSOCFT_PSY_A_CORE
Prior to admission, patient was domiciled in a sober house. He is  and has 3 children (15, 14, 3). The 2y/o lives at home with current wife and older two children live with his ex-wife. He is employed as a  at [employer] in [place] department. Patient identifies as heterosexual.

## 2022-11-08 NOTE — PATIENT PROFILE BEHAVIORAL HEALTH - REASON FOR ADMISSION
pt b\b self discharged from [facility] 4 days ago after attempt at cutting wrist pt states he rushed leaving wasn't ready lives in sober house over 2 months sober smokes 2 packs sonhelena 15 yrs old has been abusing drugs and etoh since 17 depression since age 17 on and off medications his entire life had afib had cardioversion done pt states he had moderna vaccine in rehab dosent want flu vaccine dasa 0

## 2022-11-08 NOTE — CHART NOTE - NSCHARTNOTEFT_GEN_A_CORE
Social Work Admit Note:        Patient is a 47 years of age male who was transferred from [facility] after evaluation of suicide ideation and psychosis. At the time of assessment in the emergency department, patient reported command auditory hallucination and suicidal ideation with plan to slit his wrists in setting of recent interrupted attempt to cut his wrist with a safety razor. Patient states that he was recently discharged from [facility] for dx of bipolar disorder. Since discharge, pt reports having CAH (a voice that sounds like his own), VH of a doppleganger, worsening thoughts of self-harm and unstable mood. He reported that he has been having these symptoms since he went sober off of EtOH and cocaine. He stated that today he cut his wrist with a safety razor in the setting of hearing CAH with his own voice telling him to hurt himself, and that he intended to kill himself, but was stopped by a friend. Patient endorsed some acute symptoms of depression (sleeplessness, guilt/worthlessness,  energy, impaired concentration,  appetite, and PMA), some acute symptoms of timbo (distractibility, significant energy level, increased activity, sleeplessness endorses recently talkativeness), symptoms anxiety(restlessness and fidgeting throughout the interview), and symptoms of psychosis (hearing CAH at time of exam to cause pain to himself but not kill himself and seeing "bugs," similar to gnats at time of exam). Denies acute symptoms of PTSD.        Prior to admission, patient was domiciled in a sober house. He is  and has 3 children. He is employed as a  at [employer] in [employer] department. Patient has history of multiple inpatient psychiatric admissions last in 10/24/22-11/3/22 at [facility]. Patient has history of suicide attempts by cutting wrist and a history of numerous detox/ rehabs (alcohol/cocaine). Patient denies any substance use in 64 days. He attended his first outpatient psychiatric appointment at [facility] prior to admission. Patient admitted to [facility] for treatment safety and observation.           Sexual History – Patient identifies as heterosexual.       Family relationships and history – Patient is  and has 3 children (15, 14, 3). The 2y/o lives at home with current wife and older two children live with his ex-wife.        Leisure Activity Assessment – Unable to assess.      Community Supports – Unable to assess.       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      Substance Use Assessment – Patient denies recent use of alcohol, nicotine, marijuana, or any illicit substances over the past 64 days. Patient endorsed hx of 4L/day of hard liquor for 6 years, snorting "6-10 grams" of cocaine daily for 6 years, and remote hx of marijuana and mushrooms.      History of suicidality or self- injurious behaviors – History of suicide attempt by cutting wrist and history of NSSIB.      Significant Loses – None identified.    Life Goals – Unable to assess.        will continue to meet with patient 1:1 and with treatment team daily.  Discharge plan is for continued mental health treatment in an outpatient setting.

## 2022-11-08 NOTE — ED ADULT NURSE REASSESSMENT NOTE - NS ED NURSE REASSESS COMMENT FT1
pt to be transferred to [facility] at 6am. transport set up. Report given to KERRI Nguyen. Pt aware of wait and transfer.

## 2022-11-08 NOTE — PROGRESS NOTE BEHAVIORAL HEALTH - NSBHFUPSTRENGTHS_PSY_A_CORE
Motivated and ready for change/In good physical health/Has supportive interpersonal relationships with family, friends or peers/Has access to housing/residential stability

## 2022-11-08 NOTE — PROGRESS NOTE BEHAVIORAL HEALTH - NSBHADDHXPSYCHFT_PSY_A_CORE
Patient has history of multiple inpatient psychiatric admissions last in 10/24/22-11/3/22 at [facility]. Patient has history of suicide attempts by cutting wrist and a  prior to admission. Also, patient has history of NSSIB.

## 2022-11-09 DIAGNOSIS — F29 UNSPECIFIED PSYCHOSIS NOT DUE TO A SUBSTANCE OR KNOWN PHYSIOLOGICAL CONDITION: ICD-10-CM

## 2022-11-09 DIAGNOSIS — R44.1 VISUAL HALLUCINATIONS: ICD-10-CM

## 2022-11-09 DIAGNOSIS — Z87.891 PERSONAL HISTORY OF NICOTINE DEPENDENCE: ICD-10-CM

## 2022-11-09 DIAGNOSIS — Z91.013 ALLERGY TO SEAFOOD: ICD-10-CM

## 2022-11-09 DIAGNOSIS — F14.21 COCAINE DEPENDENCE, IN REMISSION: ICD-10-CM

## 2022-11-09 DIAGNOSIS — I48.91 UNSPECIFIED ATRIAL FIBRILLATION: ICD-10-CM

## 2022-11-09 DIAGNOSIS — R45.851 SUICIDAL IDEATIONS: ICD-10-CM

## 2022-11-09 DIAGNOSIS — Z20.822 CONTACT WITH AND (SUSPECTED) EXPOSURE TO COVID-19: ICD-10-CM

## 2022-11-09 DIAGNOSIS — R42 DIZZINESS AND GIDDINESS: ICD-10-CM

## 2022-11-09 DIAGNOSIS — F17.200 NICOTINE DEPENDENCE, UNSPECIFIED, UNCOMPLICATED: ICD-10-CM

## 2022-11-09 DIAGNOSIS — F10.21 ALCOHOL DEPENDENCE, IN REMISSION: ICD-10-CM

## 2022-11-09 DIAGNOSIS — F60.89 OTHER SPECIFIC PERSONALITY DISORDERS: ICD-10-CM

## 2022-11-09 DIAGNOSIS — F32.A DEPRESSION, UNSPECIFIED: ICD-10-CM

## 2022-11-09 DIAGNOSIS — R44.0 AUDITORY HALLUCINATIONS: ICD-10-CM

## 2022-11-09 DIAGNOSIS — K21.9 GASTRO-ESOPHAGEAL REFLUX DISEASE WITHOUT ESOPHAGITIS: ICD-10-CM

## 2022-11-09 DIAGNOSIS — J02.9 ACUTE PHARYNGITIS, UNSPECIFIED: ICD-10-CM

## 2022-11-09 DIAGNOSIS — I10 ESSENTIAL (PRIMARY) HYPERTENSION: ICD-10-CM

## 2022-11-09 DIAGNOSIS — F31.5 BIPOLAR DISORDER, CURRENT EPISODE DEPRESSED, SEVERE, WITH PSYCHOTIC FEATURES: ICD-10-CM

## 2022-11-09 PROCEDURE — 99232 SBSQ HOSP IP/OBS MODERATE 35: CPT

## 2022-11-09 PROCEDURE — 99231 SBSQ HOSP IP/OBS SF/LOW 25: CPT

## 2022-11-09 RX ORDER — OLANZAPINE 15 MG/1
5 TABLET, FILM COATED ORAL EVERY 12 HOURS
Refills: 0 | Status: DISCONTINUED | OUTPATIENT
Start: 2022-11-09 | End: 2022-11-10

## 2022-11-09 RX ORDER — NICOTINE POLACRILEX 2 MG
2 GUM BUCCAL
Refills: 0 | Status: DISCONTINUED | OUTPATIENT
Start: 2022-11-09 | End: 2022-11-16

## 2022-11-09 RX ORDER — CLONAZEPAM 1 MG
1 TABLET ORAL EVERY 12 HOURS
Refills: 0 | Status: DISCONTINUED | OUTPATIENT
Start: 2022-11-09 | End: 2022-11-10

## 2022-11-09 RX ADMIN — Medication 2 MILLIGRAM(S): at 14:10

## 2022-11-09 RX ADMIN — Medication 2 MILLIGRAM(S): at 07:37

## 2022-11-09 RX ADMIN — OLANZAPINE 5 MILLIGRAM(S): 15 TABLET, FILM COATED ORAL at 11:54

## 2022-11-09 RX ADMIN — Medication 1 PATCH: at 08:21

## 2022-11-09 RX ADMIN — LOSARTAN POTASSIUM 50 MILLIGRAM(S): 100 TABLET, FILM COATED ORAL at 08:22

## 2022-11-09 RX ADMIN — LITHIUM CARBONATE 900 MILLIGRAM(S): 300 TABLET, EXTENDED RELEASE ORAL at 20:50

## 2022-11-09 RX ADMIN — HALOPERIDOL DECANOATE 5 MILLIGRAM(S): 100 INJECTION INTRAMUSCULAR at 07:36

## 2022-11-09 RX ADMIN — GABAPENTIN 300 MILLIGRAM(S): 400 CAPSULE ORAL at 12:05

## 2022-11-09 RX ADMIN — GABAPENTIN 300 MILLIGRAM(S): 400 CAPSULE ORAL at 08:22

## 2022-11-09 RX ADMIN — GABAPENTIN 300 MILLIGRAM(S): 400 CAPSULE ORAL at 20:48

## 2022-11-09 RX ADMIN — LORATADINE 10 MILLIGRAM(S): 10 TABLET ORAL at 08:22

## 2022-11-09 RX ADMIN — Medication 1 MILLIGRAM(S): at 11:54

## 2022-11-09 RX ADMIN — Medication 1 PATCH: at 18:10

## 2022-11-09 RX ADMIN — Medication 1 MILLIGRAM(S): at 20:54

## 2022-11-09 RX ADMIN — OLANZAPINE 5 MILLIGRAM(S): 15 TABLET, FILM COATED ORAL at 20:50

## 2022-11-09 RX ADMIN — HALOPERIDOL DECANOATE 5 MILLIGRAM(S): 100 INJECTION INTRAMUSCULAR at 14:10

## 2022-11-09 RX ADMIN — PANTOPRAZOLE SODIUM 40 MILLIGRAM(S): 20 TABLET, DELAYED RELEASE ORAL at 06:49

## 2022-11-09 NOTE — PROGRESS NOTE ADULT - SUBJECTIVE AND OBJECTIVE BOX
Patient is a 47y old  Male who presents with a chief complaint of pt bself discharged from [facility] 4 days ago after attempt at cutting wrist pt states he rushed leaving wasn't ready lives in sober house over 2 months sober smokes 2 packs sonhelena 15 yrs old has been abusing drugs and etoh since 17 depression since age 17 on and off medications his entire life had afib had cardioversion done pt states he had moderna vaccine in rehab dosent want flu vaccine dasa 0 (08 Nov 2022 10:40)      OVERNIGHT EVENTS: no major events    pt denies fever, chills, syncope, seizure, headache, cough, SOB, abd pain, N/V/D, urinary symptoms, legs swelling,   he admit to have hallucination  and suicidal thoughts     SUBJECTIVE / INTERVAL HPI: Patient seen and examined at bedside.     VITAL SIGNS:  Vital Signs Last 24 Hrs  T(C): 36.7 (09 Nov 2022 09:51), Max: 36.7 (09 Nov 2022 09:51)  T(F): 98 (09 Nov 2022 09:51), Max: 98 (09 Nov 2022 09:51)  HR: 83 (09 Nov 2022 09:51) (83 - 83)  BP: 135/98 (09 Nov 2022 09:51) (135/98 - 135/98)  BP(mean): --  RR: 18 (09 Nov 2022 09:51) (18 - 18)  SpO2: --        PHYSICAL EXAM:    General: WDWN  HEENT: NC/AT; PERRL, clear conjunctiva  Neck: supple  Cardiovascular: +S1/S2; RRR  Respiratory: CTA b/l; no W/R/R  Gastrointestinal: soft, NT/ND; +BSx4  Extremities: WWP; 2+ peripheral pulses; no edema   Neurological: AAOx3; no focal deficits    MEDICATIONS:  MEDICATIONS  (STANDING):  clonazePAM  Tablet 1 milliGRAM(s) Oral every 12 hours  gabapentin 300 milliGRAM(s) Oral three times a day  lithium CR (ESKALITH-CR) 900 milliGRAM(s) Oral at bedtime  loratadine 10 milliGRAM(s) Oral daily  losartan 50 milliGRAM(s) Oral daily  nicotine - 21 mG/24Hr(s) Patch 1 Patch Transdermal daily  OLANZapine 5 milliGRAM(s) Oral every 12 hours  pantoprazole    Tablet 40 milliGRAM(s) Oral before breakfast    MEDICATIONS  (PRN):  albuterol    90 MICROgram(s) HFA Inhaler 2 Puff(s) Inhalation every 6 hours PRN SOB  haloperidol     Tablet 5 milliGRAM(s) Oral every 6 hours PRN agitation  LORazepam     Tablet 2 milliGRAM(s) Oral every 6 hours PRN Agitation  traZODone 50 milliGRAM(s) Oral at bedtime PRN insomnia      ALLERGIES:  Allergies    No Known Drug Allergies  shellfish (Anaphylaxis)    Intolerances        LABS:              CAPILLARY BLOOD GLUCOSE          RADIOLOGY & ADDITIONAL TESTS: Reviewed.

## 2022-11-09 NOTE — PROGRESS NOTE ADULT - ASSESSMENT
#suicidal ideation and thoughts: management per psych    #Afib: paroxysmal  -  doesn't need AC        #suicidal ideation and thoughts: management per psych    #Afib: paroxysmal  -  chadvasc 0-1 (not sure if have hx of htn), but doesn't need AC       will sign off, call med team prn

## 2022-11-09 NOTE — PROGRESS NOTE BEHAVIORAL HEALTH - OTHER
impaired not tested mild restlessness of legs when seated, but not when standing (yesterday he was restless while standing but not today).

## 2022-11-10 DIAGNOSIS — F29 UNSPECIFIED PSYCHOSIS NOT DUE TO A SUBSTANCE OR KNOWN PHYSIOLOGICAL CONDITION: ICD-10-CM

## 2022-11-10 PROCEDURE — 99232 SBSQ HOSP IP/OBS MODERATE 35: CPT

## 2022-11-10 RX ORDER — CLONAZEPAM 1 MG
2 TABLET ORAL EVERY 12 HOURS
Refills: 0 | Status: DISCONTINUED | OUTPATIENT
Start: 2022-11-10 | End: 2022-11-11

## 2022-11-10 RX ORDER — GABAPENTIN 400 MG/1
400 CAPSULE ORAL THREE TIMES A DAY
Refills: 0 | Status: DISCONTINUED | OUTPATIENT
Start: 2022-11-10 | End: 2022-11-16

## 2022-11-10 RX ORDER — CHLORPROMAZINE HCL 10 MG
50 TABLET ORAL THREE TIMES A DAY
Refills: 0 | Status: DISCONTINUED | OUTPATIENT
Start: 2022-11-10 | End: 2022-11-16

## 2022-11-10 RX ORDER — FLUOXETINE HCL 10 MG
10 CAPSULE ORAL DAILY
Refills: 0 | Status: DISCONTINUED | OUTPATIENT
Start: 2022-11-10 | End: 2022-11-11

## 2022-11-10 RX ORDER — CHLORPROMAZINE HCL 10 MG
50 TABLET ORAL
Refills: 0 | Status: DISCONTINUED | OUTPATIENT
Start: 2022-11-10 | End: 2022-11-10

## 2022-11-10 RX ORDER — ACETAMINOPHEN 500 MG
650 TABLET ORAL EVERY 6 HOURS
Refills: 0 | Status: DISCONTINUED | OUTPATIENT
Start: 2022-11-10 | End: 2022-11-16

## 2022-11-10 RX ORDER — OLANZAPINE 15 MG/1
10 TABLET, FILM COATED ORAL EVERY 12 HOURS
Refills: 0 | Status: DISCONTINUED | OUTPATIENT
Start: 2022-11-10 | End: 2022-11-10

## 2022-11-10 RX ORDER — CHLORPROMAZINE HCL 10 MG
100 TABLET ORAL AT BEDTIME
Refills: 0 | Status: DISCONTINUED | OUTPATIENT
Start: 2022-11-10 | End: 2022-11-14

## 2022-11-10 RX ADMIN — GABAPENTIN 300 MILLIGRAM(S): 400 CAPSULE ORAL at 05:34

## 2022-11-10 RX ADMIN — OLANZAPINE 5 MILLIGRAM(S): 15 TABLET, FILM COATED ORAL at 08:26

## 2022-11-10 RX ADMIN — LOSARTAN POTASSIUM 50 MILLIGRAM(S): 100 TABLET, FILM COATED ORAL at 08:26

## 2022-11-10 RX ADMIN — Medication 650 MILLIGRAM(S): at 13:12

## 2022-11-10 RX ADMIN — Medication 2 MILLIGRAM(S): at 20:37

## 2022-11-10 RX ADMIN — Medication 2 MILLIGRAM(S): at 06:04

## 2022-11-10 RX ADMIN — Medication 1 PATCH: at 08:25

## 2022-11-10 RX ADMIN — Medication 1 MILLIGRAM(S): at 16:07

## 2022-11-10 RX ADMIN — GABAPENTIN 300 MILLIGRAM(S): 400 CAPSULE ORAL at 12:40

## 2022-11-10 RX ADMIN — PANTOPRAZOLE SODIUM 40 MILLIGRAM(S): 20 TABLET, DELAYED RELEASE ORAL at 08:26

## 2022-11-10 RX ADMIN — GABAPENTIN 400 MILLIGRAM(S): 400 CAPSULE ORAL at 20:35

## 2022-11-10 RX ADMIN — Medication 100 MILLIGRAM(S): at 20:49

## 2022-11-10 RX ADMIN — LORATADINE 10 MILLIGRAM(S): 10 TABLET ORAL at 08:26

## 2022-11-10 RX ADMIN — LITHIUM CARBONATE 900 MILLIGRAM(S): 300 TABLET, EXTENDED RELEASE ORAL at 20:35

## 2022-11-10 RX ADMIN — Medication 50 MILLIGRAM(S): at 16:08

## 2022-11-10 RX ADMIN — Medication 10 MILLIGRAM(S): at 14:49

## 2022-11-10 RX ADMIN — Medication 1 MILLIGRAM(S): at 08:26

## 2022-11-11 PROCEDURE — 99232 SBSQ HOSP IP/OBS MODERATE 35: CPT

## 2022-11-11 RX ORDER — CLONAZEPAM 1 MG
1.5 TABLET ORAL EVERY 12 HOURS
Refills: 0 | Status: DISCONTINUED | OUTPATIENT
Start: 2022-11-11 | End: 2022-11-14

## 2022-11-11 RX ORDER — ASPIRIN/CALCIUM CARB/MAGNESIUM 324 MG
81 TABLET ORAL DAILY
Refills: 0 | Status: DISCONTINUED | OUTPATIENT
Start: 2022-11-11 | End: 2022-11-16

## 2022-11-11 RX ORDER — SERTRALINE 25 MG/1
25 TABLET, FILM COATED ORAL DAILY
Refills: 0 | Status: DISCONTINUED | OUTPATIENT
Start: 2022-11-11 | End: 2022-11-14

## 2022-11-11 RX ADMIN — Medication 1 PATCH: at 07:27

## 2022-11-11 RX ADMIN — Medication 2 MILLIGRAM(S): at 11:58

## 2022-11-11 RX ADMIN — Medication 2 MILLIGRAM(S): at 08:38

## 2022-11-11 RX ADMIN — Medication 50 MILLIGRAM(S): at 10:15

## 2022-11-11 RX ADMIN — LORATADINE 10 MILLIGRAM(S): 10 TABLET ORAL at 08:37

## 2022-11-11 RX ADMIN — GABAPENTIN 400 MILLIGRAM(S): 400 CAPSULE ORAL at 13:04

## 2022-11-11 RX ADMIN — Medication 1 PATCH: at 20:21

## 2022-11-11 RX ADMIN — LOSARTAN POTASSIUM 50 MILLIGRAM(S): 100 TABLET, FILM COATED ORAL at 08:38

## 2022-11-11 RX ADMIN — Medication 100 MILLIGRAM(S): at 20:20

## 2022-11-11 RX ADMIN — Medication 1.5 MILLIGRAM(S): at 20:31

## 2022-11-11 RX ADMIN — Medication 1 MILLIGRAM(S): at 16:05

## 2022-11-11 RX ADMIN — Medication 1 MILLIGRAM(S): at 05:27

## 2022-11-11 RX ADMIN — PANTOPRAZOLE SODIUM 40 MILLIGRAM(S): 20 TABLET, DELAYED RELEASE ORAL at 06:39

## 2022-11-11 RX ADMIN — SERTRALINE 25 MILLIGRAM(S): 25 TABLET, FILM COATED ORAL at 16:05

## 2022-11-11 RX ADMIN — Medication 50 MILLIGRAM(S): at 01:13

## 2022-11-11 RX ADMIN — Medication 1 PATCH: at 08:37

## 2022-11-11 RX ADMIN — Medication 1 PATCH: at 08:41

## 2022-11-11 RX ADMIN — Medication 650 MILLIGRAM(S): at 01:02

## 2022-11-11 RX ADMIN — Medication 1 MILLIGRAM(S): at 01:13

## 2022-11-11 RX ADMIN — GABAPENTIN 400 MILLIGRAM(S): 400 CAPSULE ORAL at 20:21

## 2022-11-11 RX ADMIN — Medication 10 MILLIGRAM(S): at 08:38

## 2022-11-11 RX ADMIN — GABAPENTIN 400 MILLIGRAM(S): 400 CAPSULE ORAL at 08:38

## 2022-11-11 RX ADMIN — LITHIUM CARBONATE 900 MILLIGRAM(S): 300 TABLET, EXTENDED RELEASE ORAL at 20:20

## 2022-11-11 NOTE — CHART NOTE - NSCHARTNOTEFT_GEN_A_CORE
Patient remains on unit for continued treatment safety and observation. Patient is visible on unit and compliant with treatment as well as unit protocol. Writer meets with patient daily on rounds and or team meeting. Writer provides support and education to the patient daily. Patient to remain on unit until cleared by attending for discharge. Patient denies suicidal ideation, homicidal ideation and presents with no evidence of audio visual hallucinations. Patient placed on 1:1 for superficial cuts to wrist by pencil. Patient states he didn't do that, rather it was his alterego. Patient states there are 2 people living inside him and he can not control what is being done to him. Patient overheard on the phone by nursing stating he is getting the good stuff from the Dr on IPP. When confronted Patient stated it was not him but again his alterego. Patient submitted 72 hr notice for discharge, when TX team discussed with Patient 72 hour notice he stated it was his other self that did that and he has no desire to leave the hospital at this time.     Mental Status Exam:    Mood – Low  Sleep - Fair  Appetite - Good  ADLs - WNL  Thought Process – Linear    Observation – [id number]

## 2022-11-11 NOTE — PROGRESS NOTE ADULT - ASSESSMENT
#suicidal ideation and thoughts: management per psych    #Afib: paroxysmal  -  chadvasc 0-1 (not sure if have hx of htn), but doesn't need AC       will sign off, call med team prn     P. Afib: paroxysmal hx/o cardioversion  - pt in sinus, rate controlled  -  [id number] 1 (not sure if have hx of htn), but doesn't need AC  - aspirin 81 mg po daily    - op cardiology: Dr. Hernandez     Obesity BMI 30  - wt loss counseling per PCP     Suicidal ideation and thoughts:   management per psych    DVT px  - ambulatory       will sign off, call med team prn

## 2022-11-11 NOTE — PROGRESS NOTE ADULT - SUBJECTIVE AND OBJECTIVE BOX
LISA Rob    Subjective/Interval History       ROS    PHYSICAL EXAM  Vital Signs Last 24 Hrs  T(C): 36 (11 Nov 2022 09:53), Max: 36.6 (10 Nov 2022 11:03)  T(F): 96.8 (11 Nov 2022 09:53), Max: 97.9 (10 Nov 2022 11:03)  HR: 92 (11 Nov 2022 09:53) (79 - 100)  BP: 122/74 (11 Nov 2022 09:53) (122/74 - 170/79)  BP(mean): --  RR: 18 (11 Nov 2022 09:53) (16 - 18)  SpO2: --      GA : AAOX3, NAD   HEENT: PERRLA, EOMI  NECK: no JVD, no thyromegaly   CVS: S1 S2 no murmur no rubs no gallop  RESP: CTAB no wheeze, no rhonchi no rales  ABD: Soft, NT, ND, tympanic, no rebound or guarding   : No Cunha, No CVA tenderness   EXT; no pedal edema, no cyanosis  MSK: No ML spinal tenderness, normal ROM   NEURO: AAOX3, no new focal deficits     MEDICATIONS  (STANDING):  chlorproMAZINE    Tablet 100 milliGRAM(s) Oral at bedtime  clonazePAM  Tablet 2 milliGRAM(s) Oral every 12 hours  FLUoxetine 10 milliGRAM(s) Oral daily  gabapentin 400 milliGRAM(s) Oral three times a day  lithium CR (ESKALITH-CR) 900 milliGRAM(s) Oral at bedtime  loratadine 10 milliGRAM(s) Oral daily  losartan 50 milliGRAM(s) Oral daily  nicotine - 21 mG/24Hr(s) Patch 1 Patch Transdermal daily  pantoprazole    Tablet 40 milliGRAM(s) Oral before breakfast    MEDICATIONS  (PRN):  acetaminophen     Tablet .. 650 milliGRAM(s) Oral every 6 hours PRN Moderate Pain (4 - 6)  albuterol    90 MICROgram(s) HFA Inhaler 2 Puff(s) Inhalation every 6 hours PRN SOB  chlorproMAZINE    Tablet 50 milliGRAM(s) Oral three times a day PRN psychotic agitation  LORazepam     Tablet 1 milliGRAM(s) Oral four times a day PRN severe anxiety  nicotine  Polacrilex Gum 2 milliGRAM(s) Oral every 2 hours PRN Nicotine dependence with withdrawal  traZODone 50 milliGRAM(s) Oral at bedtime PRN insomnia      LABS/ IMAGING              CAPILLARY BLOOD GLUCOSE                   LISA [id number]    Subjective/Interval History   - no chest pain    ROS  - no fever, chills or lightheadedness     PHYSICAL EXAM  Vital Signs Last 24 Hrs  T(C): 36 (11 Nov 2022 09:53), Max: 36.6 (10 Nov 2022 11:03)  T(F): 96.8 (11 Nov 2022 09:53), Max: 97.9 (10 Nov 2022 11:03)  HR: 92 (11 Nov 2022 09:53) (79 - 100)  BP: 122/74 (11 Nov 2022 09:53) (122/74 - 170/79)  BP(mean): --  RR: 18 (11 Nov 2022 09:53) (16 - 18)  SpO2: --      GA : AAOX3, NAD, obese   HEENT: PERRLA, EOMI  NECK: no JVD, no thyromegaly   CVS: S1 S2 no murmur no rubs no gallop  RESP: CTAB no wheeze, no rhonchi no rales  ABD: Soft, NT, ND, tympanic, no rebound or guarding   : No Cunha, No CVA tenderness   EXT; no pedal edema, no cyanosis  MSK: No ML spinal tenderness, normal ROM   NEURO: AAOX3, no new focal deficits     MEDICATIONS  (STANDING):  chlorproMAZINE    Tablet 100 milliGRAM(s) Oral at bedtime  clonazePAM  Tablet 2 milliGRAM(s) Oral every 12 hours  FLUoxetine 10 milliGRAM(s) Oral daily  gabapentin 400 milliGRAM(s) Oral three times a day  lithium CR (ESKALITH-CR) 900 milliGRAM(s) Oral at bedtime  loratadine 10 milliGRAM(s) Oral daily  losartan 50 milliGRAM(s) Oral daily  nicotine - 21 mG/24Hr(s) Patch 1 Patch Transdermal daily  pantoprazole    Tablet 40 milliGRAM(s) Oral before breakfast    MEDICATIONS  (PRN):  acetaminophen     Tablet .. 650 milliGRAM(s) Oral every 6 hours PRN Moderate Pain (4 - 6)  albuterol    90 MICROgram(s) HFA Inhaler 2 Puff(s) Inhalation every 6 hours PRN SOB  chlorproMAZINE    Tablet 50 milliGRAM(s) Oral three times a day PRN psychotic agitation  LORazepam     Tablet 1 milliGRAM(s) Oral four times a day PRN severe anxiety  nicotine  Polacrilex Gum 2 milliGRAM(s) Oral every 2 hours PRN Nicotine dependence with withdrawal  traZODone 50 milliGRAM(s) Oral at bedtime PRN insomnia      LABS/ IMAGING              CAPILLARY BLOOD GLUCOSE

## 2022-11-12 DIAGNOSIS — F10.21 ALCOHOL DEPENDENCE, IN REMISSION: ICD-10-CM

## 2022-11-12 DIAGNOSIS — F14.20 COCAINE DEPENDENCE, UNCOMPLICATED: ICD-10-CM

## 2022-11-12 DIAGNOSIS — F17.200 NICOTINE DEPENDENCE, UNSPECIFIED, UNCOMPLICATED: ICD-10-CM

## 2022-11-12 DIAGNOSIS — F31.9 BIPOLAR DISORDER, UNSPECIFIED: ICD-10-CM

## 2022-11-12 PROCEDURE — 99231 SBSQ HOSP IP/OBS SF/LOW 25: CPT | Mod: GC

## 2022-11-12 RX ORDER — TRAZODONE HCL 50 MG
50 TABLET ORAL ONCE
Refills: 0 | Status: COMPLETED | OUTPATIENT
Start: 2022-11-12 | End: 2022-11-12

## 2022-11-12 RX ADMIN — GABAPENTIN 400 MILLIGRAM(S): 400 CAPSULE ORAL at 08:02

## 2022-11-12 RX ADMIN — LORATADINE 10 MILLIGRAM(S): 10 TABLET ORAL at 08:02

## 2022-11-12 RX ADMIN — GABAPENTIN 400 MILLIGRAM(S): 400 CAPSULE ORAL at 13:53

## 2022-11-12 RX ADMIN — PANTOPRAZOLE SODIUM 40 MILLIGRAM(S): 20 TABLET, DELAYED RELEASE ORAL at 06:32

## 2022-11-12 RX ADMIN — Medication 1 PATCH: at 19:41

## 2022-11-12 RX ADMIN — Medication 100 MILLIGRAM(S): at 19:55

## 2022-11-12 RX ADMIN — Medication 50 MILLIGRAM(S): at 20:31

## 2022-11-12 RX ADMIN — LITHIUM CARBONATE 900 MILLIGRAM(S): 300 TABLET, EXTENDED RELEASE ORAL at 19:55

## 2022-11-12 RX ADMIN — Medication 1.5 MILLIGRAM(S): at 19:56

## 2022-11-12 RX ADMIN — Medication 50 MILLIGRAM(S): at 20:21

## 2022-11-12 RX ADMIN — Medication 1.5 MILLIGRAM(S): at 08:03

## 2022-11-12 RX ADMIN — GABAPENTIN 400 MILLIGRAM(S): 400 CAPSULE ORAL at 19:55

## 2022-11-12 RX ADMIN — SERTRALINE 25 MILLIGRAM(S): 25 TABLET, FILM COATED ORAL at 13:53

## 2022-11-12 RX ADMIN — Medication 81 MILLIGRAM(S): at 13:53

## 2022-11-12 RX ADMIN — Medication 1 PATCH: at 08:02

## 2022-11-12 RX ADMIN — Medication 1 MILLIGRAM(S): at 10:38

## 2022-11-12 RX ADMIN — LOSARTAN POTASSIUM 50 MILLIGRAM(S): 100 TABLET, FILM COATED ORAL at 08:02

## 2022-11-12 NOTE — PROGRESS NOTE BEHAVIORAL HEALTH - SUMMARY
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 In treatment recently with Psychiatrist and therapist at [facility]. No legal history, no trauma history.    On evaluation today, Mr. Zarate continues to present with active suicidal ideation with intent to harm himself on the unit. Continue medication plan as below, with 1:1 constant observation for safety. No ability to safety contract at this time. Does not appear acutely manic or psychotic.     #Bipolar disorder I with psychotic features  - PLEASE NOTE THIS PATIENT IS ON A 9.13 LEGAL STATUS, NOT 9.39  - Thorazine 100 mg PO at bedtime  - Klonopin 1.5 mg PO q12h  - Gabapentin 400 mg PO TID  - Lithium 900 mg PO at bedtime  - Zoloft 25 mg PO daily     #HTN  - Losartan 10 mg PO once a day    #insomnia  - Trazodone 50 mg PO one a day at bedtime PRN insomnia    #nicotine use disorder  - Nicotine gum 2 mg PRN    #allergies  - Loratadine 10 mg PO once a day    - Aspirin 81 mg PO once a day    #PRNs for anxiety, agitation   - Ativan 1 mg PO four times a day PRN severe anxiety   - Thorazine 50 mg PO q6h PRN severe agitation 48 yo  male, domiciled in sober house,  with 3 children (ages 15, 14, 3, 2y/o lives at home with current wife, older two live with his ex wife), 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 In treatment recently with Psychiatrist and therapist at [facility]. No legal history, no trauma history.    On evaluation today, Mr. Zarate continues to present with active suicidal ideation with intent to harm himself on the unit. Continue medication plan as below, with 1:1 constant observation for safety. No ability to safety contract at this time. Does not appear acutely manic or psychotic.     #Bipolar disorder I with psychotic features  - PLEASE NOTE THIS PATIENT IS ON A 9.13 LEGAL STATUS, NOT 9.39  - Thorazine 100 mg PO at bedtime  - Klonopin 1.5 mg PO q12h  - Gabapentin 400 mg PO TID  - Lithium 900 mg PO at bedtime  - Zoloft 25 mg PO daily   - F/U lithium level and BMP tomorrow     #HTN  - Losartan 10 mg PO once a day    #insomnia  - Trazodone 50 mg PO one a day at bedtime PRN insomnia    #nicotine use disorder  - Nicotine gum 2 mg PRN    #allergies  - Loratadine 10 mg PO once a day    - Aspirin 81 mg PO once a day    #PRNs for anxiety, agitation   - Ativan 1 mg PO four times a day PRN severe anxiety   - Thorazine 50 mg PO q6h PRN severe agitation

## 2022-11-12 NOTE — PROGRESS NOTE BEHAVIORAL HEALTH - OTHER
mild to moderate restlessness impaired not tested focused on removing 1:1 and committing suicide not observed

## 2022-11-13 LAB
ANION GAP SERPL CALC-SCNC: 11 MMOL/L — SIGNIFICANT CHANGE UP (ref 7–14)
BUN SERPL-MCNC: 12 MG/DL — SIGNIFICANT CHANGE UP (ref 10–20)
CALCIUM SERPL-MCNC: 9.5 MG/DL — SIGNIFICANT CHANGE UP (ref 8.4–10.5)
CHLORIDE SERPL-SCNC: 102 MMOL/L — SIGNIFICANT CHANGE UP (ref 98–110)
CO2 SERPL-SCNC: 25 MMOL/L — SIGNIFICANT CHANGE UP (ref 17–32)
CREAT SERPL-MCNC: 1.1 MG/DL — SIGNIFICANT CHANGE UP (ref 0.7–1.5)
EGFR: 84 ML/MIN/1.73M2 — SIGNIFICANT CHANGE UP
GLUCOSE SERPL-MCNC: 105 MG/DL — HIGH (ref 70–99)
LITHIUM SERPL-MCNC: 0.9 MMOL/L — SIGNIFICANT CHANGE UP (ref 0.6–1.2)
POTASSIUM SERPL-MCNC: 4.6 MMOL/L — SIGNIFICANT CHANGE UP (ref 3.5–5)
POTASSIUM SERPL-SCNC: 4.6 MMOL/L — SIGNIFICANT CHANGE UP (ref 3.5–5)
SODIUM SERPL-SCNC: 138 MMOL/L — SIGNIFICANT CHANGE UP (ref 135–146)

## 2022-11-13 RX ADMIN — Medication 1 MILLIGRAM(S): at 06:21

## 2022-11-13 RX ADMIN — LORATADINE 10 MILLIGRAM(S): 10 TABLET ORAL at 08:17

## 2022-11-13 RX ADMIN — GABAPENTIN 400 MILLIGRAM(S): 400 CAPSULE ORAL at 08:17

## 2022-11-13 RX ADMIN — Medication 1 PATCH: at 08:17

## 2022-11-13 RX ADMIN — GABAPENTIN 400 MILLIGRAM(S): 400 CAPSULE ORAL at 20:06

## 2022-11-13 RX ADMIN — Medication 50 MILLIGRAM(S): at 06:21

## 2022-11-13 RX ADMIN — Medication 1 PATCH: at 10:10

## 2022-11-13 RX ADMIN — Medication 100 MILLIGRAM(S): at 20:06

## 2022-11-13 RX ADMIN — Medication 1 MILLIGRAM(S): at 18:20

## 2022-11-13 RX ADMIN — SERTRALINE 25 MILLIGRAM(S): 25 TABLET, FILM COATED ORAL at 08:19

## 2022-11-13 RX ADMIN — Medication 1.5 MILLIGRAM(S): at 08:18

## 2022-11-13 RX ADMIN — LITHIUM CARBONATE 900 MILLIGRAM(S): 300 TABLET, EXTENDED RELEASE ORAL at 20:06

## 2022-11-13 RX ADMIN — Medication 1 MILLIGRAM(S): at 10:13

## 2022-11-13 RX ADMIN — PANTOPRAZOLE SODIUM 40 MILLIGRAM(S): 20 TABLET, DELAYED RELEASE ORAL at 07:56

## 2022-11-13 RX ADMIN — Medication 81 MILLIGRAM(S): at 08:18

## 2022-11-13 RX ADMIN — LOSARTAN POTASSIUM 50 MILLIGRAM(S): 100 TABLET, FILM COATED ORAL at 08:17

## 2022-11-13 RX ADMIN — Medication 1.5 MILLIGRAM(S): at 20:07

## 2022-11-13 RX ADMIN — Medication 1 PATCH: at 20:30

## 2022-11-13 NOTE — PROGRESS NOTE BEHAVIORAL HEALTH - OTHER
mild to moderate restlessness not observed not tested impaired focused on removing 1:1 and committing suicide

## 2022-11-13 NOTE — PROGRESS NOTE BEHAVIORAL HEALTH - SUMMARY
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 In treatment recently with Psychiatrist and therapist at [facility]. No legal history, no trauma history.    On evaluation today, Mr. Zarate continues to present with active suicidal ideation with intent to harm himself on the unit. Continue medication plan as below, with 1:1 constant observation for safety. No ability to safety contract at this time. Does not appear acutely manic or psychotic.     #Bipolar disorder I with psychotic features  - PLEASE NOTE THIS PATIENT IS ON A 9.13 LEGAL STATUS, NOT 9.39  - Thorazine 100 mg PO at bedtime  - Klonopin 1.5 mg PO q12h  - Gabapentin 400 mg PO TID  - Lithium 900 mg PO at bedtime  - Zoloft 25 mg PO daily   - F/U lithium level and BMP tomorrow     #HTN  - Losartan 10 mg PO once a day    #insomnia  - Trazodone 50 mg PO one a day at bedtime PRN insomnia    #nicotine use disorder  - Nicotine gum 2 mg PRN    #allergies  - Loratadine 10 mg PO once a day    - Aspirin 81 mg PO once a day    #PRNs for anxiety, agitation   - Ativan 1 mg PO four times a day PRN severe anxiety   - Thorazine 50 mg PO q6h PRN severe agitation

## 2022-11-14 PROCEDURE — 99232 SBSQ HOSP IP/OBS MODERATE 35: CPT

## 2022-11-14 RX ORDER — CHLORPROMAZINE HCL 10 MG
50 TABLET ORAL DAILY
Refills: 0 | Status: DISCONTINUED | OUTPATIENT
Start: 2022-11-14 | End: 2022-11-16

## 2022-11-14 RX ORDER — CHLORPROMAZINE HCL 10 MG
150 TABLET ORAL AT BEDTIME
Refills: 0 | Status: DISCONTINUED | OUTPATIENT
Start: 2022-11-14 | End: 2022-11-16

## 2022-11-14 RX ORDER — SERTRALINE 25 MG/1
50 TABLET, FILM COATED ORAL DAILY
Refills: 0 | Status: DISCONTINUED | OUTPATIENT
Start: 2022-11-15 | End: 2022-11-16

## 2022-11-14 RX ORDER — CLONAZEPAM 1 MG
1 TABLET ORAL EVERY 12 HOURS
Refills: 0 | Status: DISCONTINUED | OUTPATIENT
Start: 2022-11-14 | End: 2022-11-16

## 2022-11-14 RX ADMIN — LOSARTAN POTASSIUM 50 MILLIGRAM(S): 100 TABLET, FILM COATED ORAL at 08:03

## 2022-11-14 RX ADMIN — Medication 650 MILLIGRAM(S): at 12:11

## 2022-11-14 RX ADMIN — Medication 1.5 MILLIGRAM(S): at 08:02

## 2022-11-14 RX ADMIN — Medication 50 MILLIGRAM(S): at 20:07

## 2022-11-14 RX ADMIN — Medication 1 PATCH: at 18:32

## 2022-11-14 RX ADMIN — Medication 100 MILLIGRAM(S): at 20:03

## 2022-11-14 RX ADMIN — LORATADINE 10 MILLIGRAM(S): 10 TABLET ORAL at 08:03

## 2022-11-14 RX ADMIN — GABAPENTIN 400 MILLIGRAM(S): 400 CAPSULE ORAL at 08:02

## 2022-11-14 RX ADMIN — Medication 650 MILLIGRAM(S): at 12:51

## 2022-11-14 RX ADMIN — GABAPENTIN 400 MILLIGRAM(S): 400 CAPSULE ORAL at 20:03

## 2022-11-14 RX ADMIN — PANTOPRAZOLE SODIUM 40 MILLIGRAM(S): 20 TABLET, DELAYED RELEASE ORAL at 08:02

## 2022-11-14 RX ADMIN — Medication 1 PATCH: at 08:01

## 2022-11-14 RX ADMIN — Medication 81 MILLIGRAM(S): at 08:03

## 2022-11-14 RX ADMIN — GABAPENTIN 400 MILLIGRAM(S): 400 CAPSULE ORAL at 12:13

## 2022-11-14 RX ADMIN — LITHIUM CARBONATE 900 MILLIGRAM(S): 300 TABLET, EXTENDED RELEASE ORAL at 20:03

## 2022-11-14 RX ADMIN — Medication 1 PATCH: at 08:06

## 2022-11-14 RX ADMIN — SERTRALINE 25 MILLIGRAM(S): 25 TABLET, FILM COATED ORAL at 08:02

## 2022-11-14 RX ADMIN — Medication 1.5 MILLIGRAM(S): at 20:04

## 2022-11-14 RX ADMIN — Medication 1 PATCH: at 07:38

## 2022-11-14 NOTE — PROGRESS NOTE BEHAVIORAL HEALTH - OTHER
not observed impaired focused on removing 1:1 and committing suicide not tested mild to moderate restlessness

## 2022-11-14 NOTE — CHART NOTE - NSCHARTNOTEFT_GEN_A_CORE
Writer met with patient; Pt assessed writer provided support and education. Treatment plan and discharge plan discussed. Patient is compliant with treatment and unit protocol. Patient is taking medications as prescribed and is compliant with unit rules. Patient contracts for safety on the unit. Patient is oriented on all spheres, denies current suicidal and or homicidal ideations. Patient denies audio visual hallucinations, Patient continues to express suicidal thoughts in the context of his substance use. Patient reports he damaged a lot of lives and destroyed many relationships during his active use. Patient reports feelings of guilt and shame that contribute to his feelings of hopelessness and helplessness. Patient states he does not know how to cope with life without substances. Patient states he knows he must maintain his mental health or else he will relapse. Patient states he does not know how to maintain his mental health while sober. Treatment team consults with CATCH team for inpatient NATHEN options. Patient is in good behavioral control at this time. Activities of daily living are within normal limits. Patient to remain on unit until cleared by attending for discharge.    Mental Status Exam:    Mood – Low  Sleep - Fair  Appetite - Good  ADLs - WNL  Thought Process – Linear    Observation – [id number]

## 2022-11-14 NOTE — PROGRESS NOTE BEHAVIORAL HEALTH - SUMMARY
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 In treatment recently with Psychiatrist and therapist at [facility]. No legal history, no trauma history.    On evaluation today, Mr. Zarate continues to present with active suicidal ideation with intent to harm himself on the unit. Continue medication plan as below, with 1:1 constant observation for safety. No ability to safety contract at this time. Does not appear acutely manic or psychotic.     #Bipolar disorder I with psychotic features  - PLEASE NOTE THIS PATIENT IS ON A 9.13 LEGAL STATUS, NOT 9.39  - Thorazine 100 mg PO at bedtime  - Klonopin 1.5 mg PO q12h  - Gabapentin 400 mg PO TID  - Lithium 900 mg PO at bedtime  - Zoloft 25 mg PO daily   - F/U lithium level and BMP tomorrow     #HTN  - Losartan 10 mg PO once a day    #insomnia  - Trazodone 50 mg PO one a day at bedtime PRN insomnia    #nicotine use disorder  - Nicotine gum 2 mg PRN    #allergies  - Loratadine 10 mg PO once a day    - Aspirin 81 mg PO once a day    #PRNs for anxiety, agitation   - Ativan 1 mg PO four times a day PRN severe anxiety   - Thorazine 50 mg PO q6h PRN severe agitation

## 2022-11-15 DIAGNOSIS — F32.9 MAJOR DEPRESSIVE DISORDER, SINGLE EPISODE, UNSPECIFIED: ICD-10-CM

## 2022-11-15 PROCEDURE — 70450 CT HEAD/BRAIN W/O DYE: CPT | Mod: 26

## 2022-11-15 PROCEDURE — 99232 SBSQ HOSP IP/OBS MODERATE 35: CPT

## 2022-11-15 RX ORDER — ACETAMINOPHEN 500 MG
650 TABLET ORAL EVERY 8 HOURS
Refills: 0 | Status: DISCONTINUED | OUTPATIENT
Start: 2022-11-15 | End: 2022-11-15

## 2022-11-15 RX ADMIN — PANTOPRAZOLE SODIUM 40 MILLIGRAM(S): 20 TABLET, DELAYED RELEASE ORAL at 08:10

## 2022-11-15 RX ADMIN — Medication 1 MILLIGRAM(S): at 20:14

## 2022-11-15 RX ADMIN — Medication 1 MILLIGRAM(S): at 16:19

## 2022-11-15 RX ADMIN — Medication 1 MILLIGRAM(S): at 08:10

## 2022-11-15 RX ADMIN — GABAPENTIN 400 MILLIGRAM(S): 400 CAPSULE ORAL at 12:48

## 2022-11-15 RX ADMIN — Medication 81 MILLIGRAM(S): at 08:10

## 2022-11-15 RX ADMIN — LITHIUM CARBONATE 900 MILLIGRAM(S): 300 TABLET, EXTENDED RELEASE ORAL at 20:14

## 2022-11-15 RX ADMIN — Medication 1 PATCH: at 20:19

## 2022-11-15 RX ADMIN — Medication 1 PATCH: at 12:23

## 2022-11-15 RX ADMIN — SERTRALINE 50 MILLIGRAM(S): 25 TABLET, FILM COATED ORAL at 08:11

## 2022-11-15 RX ADMIN — Medication 1 PATCH: at 12:22

## 2022-11-15 RX ADMIN — Medication 650 MILLIGRAM(S): at 18:21

## 2022-11-15 RX ADMIN — Medication 150 MILLIGRAM(S): at 20:15

## 2022-11-15 RX ADMIN — Medication 1 PATCH: at 08:09

## 2022-11-15 RX ADMIN — GABAPENTIN 400 MILLIGRAM(S): 400 CAPSULE ORAL at 20:15

## 2022-11-15 RX ADMIN — Medication 50 MILLIGRAM(S): at 08:11

## 2022-11-15 RX ADMIN — GABAPENTIN 400 MILLIGRAM(S): 400 CAPSULE ORAL at 08:10

## 2022-11-15 RX ADMIN — LOSARTAN POTASSIUM 50 MILLIGRAM(S): 100 TABLET, FILM COATED ORAL at 08:10

## 2022-11-15 RX ADMIN — LORATADINE 10 MILLIGRAM(S): 10 TABLET ORAL at 08:09

## 2022-11-15 NOTE — PROGRESS NOTE BEHAVIORAL HEALTH - AFFECT QUALITY
Anxious/Euthymic
Depressed/Anxious

## 2022-11-15 NOTE — CHART NOTE - NSCHARTNOTEFT_GEN_A_CORE
Patient reports unwitnessed fall when getting up from his bed, states he slipped on his bedsheet.  - Claims he struck left arm: No pain reported  - Claims he struck back of his head on the floor: now has a new headache    PE No focal neurologic deficit.  - moves all extremities equally  - Head: No bruises on scalp    Plan: CT Head Non-contrast  R/O acute any Cerebral injury/bleed          Tylenol for headache Patient reports unwitnessed fall when getting up from his bed, states he slipped on his bedsheet.  - Claims he struck left arm: No pain reported  - Claims he struck back of his head on the floor: now has a new headache    /83,    HR 73    PE No focal neurologic deficit.  - moves all extremities equally  - Head: No bruises on scalp    Plan: CT Head Non-contrast  R/O acute any Cerebral injury/bleed          Tylenol for headache

## 2022-11-15 NOTE — PROGRESS NOTE BEHAVIORAL HEALTH - NSBHCHARTREVIEWVS_PSY_A_CORE FT
Vital Signs Last 24 Hrs  T(C): 36.4 (12 Nov 2022 16:15), Max: 36.4 (12 Nov 2022 16:15)  T(F): 97.5 (12 Nov 2022 16:15), Max: 97.5 (12 Nov 2022 16:15)  HR: 74 (12 Nov 2022 16:15) (74 - 84)  BP: 131/82 (12 Nov 2022 16:15) (130/55 - 131/82)  BP(mean): --  RR: 16 (12 Nov 2022 16:15) (16 - 16)  SpO2: --
Vital Signs Last 24 Hrs  T(C): 36.4 (11 Nov 2022 16:13), Max: 36.4 (11 Nov 2022 16:13)  T(F): 97.6 (11 Nov 2022 16:13), Max: 97.6 (11 Nov 2022 16:13)  HR: 87 (11 Nov 2022 16:13) (87 - 92)  BP: 143/83 (11 Nov 2022 16:13) (122/74 - 143/83)  BP(mean): --  RR: 20 (11 Nov 2022 16:13) (18 - 20)  SpO2: --
Vital Signs Last 24 Hrs  T(C): 36.3 (14 Nov 2022 08:30), Max: 36.6 (14 Nov 2022 03:09)  T(F): 97.4 (14 Nov 2022 08:30), Max: 97.9 (14 Nov 2022 03:09)  HR: 65 (14 Nov 2022 08:30) (65 - 70)  BP: 131/80 (14 Nov 2022 08:30) (131/80 - 133/81)  BP(mean): --  RR: 18 (14 Nov 2022 08:30) (18 - 18)  SpO2: --
Vital Signs Last 24 Hrs  T(C): 35.6 (15 Nov 2022 16:01), Max: 36.6 (15 Nov 2022 09:28)  T(F): 96 (15 Nov 2022 16:01), Max: 97.9 (15 Nov 2022 09:28)  HR: 70 (15 Nov 2022 16:01) (70 - 73)  BP: 144/69 (15 Nov 2022 16:01) (125/85 - 144/82)  BP(mean): --  RR: 18 (15 Nov 2022 16:01) (18 - 20)  SpO2: --
Vital Signs Last 24 Hrs  T(C): 36 (10 Nov 2022 16:26), Max: 36.7 (10 Nov 2022 06:12)  T(F): 96.8 (10 Nov 2022 16:26), Max: 98 (10 Nov 2022 06:12)  HR: 79 (10 Nov 2022 16:26) (79 - 100)  BP: 170/79 (10 Nov 2022 16:26) (131/81 - 170/79)  BP(mean): --  RR: 16 (10 Nov 2022 16:26) (16 - 18)  SpO2: --
Vital Signs Last 24 Hrs  T(C): 36.7 (09 Nov 2022 09:51), Max: 36.7 (09 Nov 2022 09:51)  T(F): 98 (09 Nov 2022 09:51), Max: 98 (09 Nov 2022 09:51)  HR: 83 (09 Nov 2022 09:51) (83 - 83)  BP: 135/98 (09 Nov 2022 09:51) (135/98 - 135/98)  BP(mean): --  RR: 18 (09 Nov 2022 09:51) (18 - 18)  SpO2: --
Vital Signs Last 24 Hrs  T(C): 36.4 (13 Nov 2022 16:00), Max: 36.5 (13 Nov 2022 09:07)  T(F): 97.5 (13 Nov 2022 16:00), Max: 97.7 (13 Nov 2022 09:07)  HR: 75 (13 Nov 2022 16:00) (66 - 84)  BP: 142/78 (13 Nov 2022 16:00) (131/82 - 142/78)  BP(mean): --  RR: 18 (13 Nov 2022 16:00) (16 - 18)  SpO2: --
Vital Signs Last 24 Hrs  T(C): 36.7 (08 Nov 2022 07:09), Max: 36.7 (07 Nov 2022 23:35)  T(F): 98 (08 Nov 2022 07:09), Max: 98.1 (07 Nov 2022 23:35)  HR: 66 (08 Nov 2022 10:34) (58 - 73)  BP: 119/90 (08 Nov 2022 10:34) (106/61 - 149/77)  BP(mean): --  RR: 20 (08 Nov 2022 10:34) (17 - 20)  SpO2: 97% (08 Nov 2022 07:09) (95% - 97%)

## 2022-11-15 NOTE — PROGRESS NOTE BEHAVIORAL HEALTH - NSBHATTESTTYPEVISIT_PSY_A_CORE
Attending Only
Attending with Resident/Fellow/Student

## 2022-11-15 NOTE — PROGRESS NOTE BEHAVIORAL HEALTH - NSBHCHARTREVIEWIMAGING_PSY_A_CORE FT
EXAM:  CT BRAIN                          PROCEDURE DATE:  10/29/2022          INTERPRETATION:  PROCEDURE: CT head without intravenous contrast    CLINICAL INDICATION: Posterior head trauma    TECHNIQUE: Axial CT imaging of the brain is obtained with multiplanar   images reviewed and displayed with both bone and soft tissue algorithm.    COMPARISON: 10/26/2022    FINDINGS:    Ventricles, cisterns and sulci are unremarkable. No hydrocephalus, mass   effect or midline shift.    No acute intracranial hemorrhage or extra-axial fluid collection.    Gray to white differentiation appears preserved, without CT evidence of   recent transcortical or territorial infarct.    Bone algorithm images show no calvarial fracture or acute abnormality of   the calvarium or skull base. Paranasal sinuses and mastoids included on   this scan show no acute disease.    IMPRESSION:  No acute intracranial hemorrhage or calvarial fracture  STEPHANIE ROSE MD; Attending Radiologist    EXAM:  XR CHEST PORTABLE ROUTINE 1V                        PROCEDURE DATE:  10/31/2022    INTERPRETATION:  TECHNIQUE: Single portable view of the chest.  COMPARISON:  4/18/2013    CLINICAL HISTORY: Shortness of Breath    FINDINGS:  Single frontal view of the chest demonstrates the lungs to be clear. The   cardiomediastinal silhouette is normal. No acute osseous abnormalities.    IMPRESSION: No acute cardiopulmonary disease process.    MARIAM RENE MD; Attending Radiologist  This document has been electronically signed. Oct 31 2022  9:39PM

## 2022-11-15 NOTE — PROGRESS NOTE BEHAVIORAL HEALTH - SUMMARY
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 In treatment recently with Psychiatrist and therapist at [facility]. No legal history, no trauma history.    On evaluation today, Mr. Zarate continues to present with active suicidal ideation with intent to harm himself on the unit. Continue medication plan as below, with 1:1 constant observation for safety. No ability to safety contract at this time. Does not appear acutely manic or psychotic.     #Bipolar disorder I with psychotic features  - PLEASE NOTE THIS PATIENT IS ON A 9.13 LEGAL STATUS, NOT 9.39  - Thorazine 100 mg PO at bedtime  - Klonopin 1.5 mg PO q12h  - Gabapentin 400 mg PO TID  - Lithium 900 mg PO at bedtime  - Zoloft 25 mg PO daily   - F/U lithium level and BMP tomorrow     #HTN  - Losartan 10 mg PO once a day    #insomnia  - Trazodone 50 mg PO one a day at bedtime PRN insomnia    #nicotine use disorder  - Nicotine gum 2 mg PRN    #allergies  - Loratadine 10 mg PO once a day    - Aspirin 81 mg PO once a day    #PRNs for anxiety, agitation   - Ativan 1 mg PO four times a day PRN severe anxiety   - Thorazine 50 mg PO q6h PRN severe agitation

## 2022-11-15 NOTE — PROGRESS NOTE BEHAVIORAL HEALTH - NSBHPTASSESSDT_PSY_A_CORE
11-Nov-2022
08-Nov-2022 14:42
10-Nov-2022
12-Nov-2022 16:51
09-Nov-2022
14-Nov-2022
15-Nov-2022 14:00
13-Nov-2022 16:08

## 2022-11-15 NOTE — PROGRESS NOTE BEHAVIORAL HEALTH - NS ED BHA MSE SPEECH SPONTANEITY
Subjective:      Patient ID: Ashley Manning is a 48 y.o. female. HPI patient here for multiple medical issues   1. Was admitted last week to the hospital for a syncopal episode. CT head negative. Was evaluated by cardiology and neurology and cleared for discharge  Has appointment with cardiology next month  Has history of  Chronic systolic CHF- stable. She is on Coreg lisinopril Lasix and Aldactone, follows up with the CHF clinic and has been doing well. [employer] outreach is helping her get to her appointments  2. Asthma- She is on Incruse Ellipta and Atrovent. Denies any dyspnea or wheezing does not think her asthma is acting up. .Continues to smoke, but has been cutting down I had prescribed Nicotine gum last visit-she stopped using them because she did not like the taste. 3.  Was found to have a liver mass on ultrasound in May 2016 follow-up MRI was ordered that could not be done because she has a defibrillator. Follow-up CT liver protocol was ordered but that could not be done either because they could not find venous access for IV contrast  4. Abnormal screening mammogram- Diagnostic mammogram and US of right breast was ordered which she has not done yet  5. Prediabetes-not on any meds. Last A1C-5.9 02/17  6. Complained of heart burn in the pasy she had already tried Pepcid so I had started her on Prilosec-symptoms much better  7.   allergies are bothering-stable symptoms on Flonase and Claritin  8. Was found to have a lung nodule, which is stable on repeat CT  9. Patient lately has been experiencing a lot of hot flashes and her mood has been very irritable, she really wants something done about it     Review of Systems  Negative for fever  HENT: Negative for nosebleeds. Respiratory: Negative for  shortness of breath, wheezing and stridor. Cardiovascular: Negative for chest pain, palpitations and leg swelling.    Gastrointestinal: Negative for nausea, vomiting, abdominal pain, diarrhea
Normal

## 2022-11-15 NOTE — PROGRESS NOTE BEHAVIORAL HEALTH - NSBHFUPINTERVALHXFT_PSY_A_CORE
Patient was given PRN doses of Haldol and ativan as prescribed for agitation today's morning at 0700 hours. And, when I saw him about 0900 he only told me he needed me to adjust his medications to help him feel calm. However, a couple of hours later he told unit's nurse that he heard voices and he wanted to have additional medications and then increased his dose of Olanzapine to 5mg every 12 hours with the first dose daytime dose of Olanzapine 5mg at 1100 hours together with the first dose of Clonazepam 1mg every 12 hours. Besides, he stated he wanted to have Nicotine gum besides Nicotine Patch because he usually smokes 2 1/2 to 3 PPD of cigarettes.
Patient wrote today another 72-hour-letter requesting his discharge, but just like he did yesterday when he wrote another 72-hour-letter when I asked him if he wanted to be discharged he stated he did not want to be discharged because he wants to be helped. Besides, a nurse from his unit overheard him talking in the hallway saying "he is giving me the good shit I don’t even have to buy it on the street no more," and when confronted about it he stated that he had not said that but maybe his other personality had said it. He stated he has another personality that does different things like abusing drugs, and he told me he does not even like alcohol or benzodiazepines and told me I can take him off benzodiazepines.   Then later on today I received an email informing me that "Pt is asking about disability is trying to apply from the payphone and is calling met [facility] to see if he can get short term disability. Social work has given direction and information about how to apply post DC [discharge]." After that I decreased his standing dose of Clonazepam from 2mg every 12 hours to 1.5mg every 12 hours. And I decreased his PRN dose of Lorazepam to 1mg every 6 hours because another prescriber had put an order for PRN Lorazepam 2mg every 6 hours.
Because of patient's complaints of anxiety and his overt restlessness I think that he may have akathisia from the PRN doses of Haloperidol he received together with Lorazepam, and it is not clear whether his standing dose of Olanzapine has contributed or not. I discontinued his orders of Haloperidol and Olanzapine, and started him on Chlorpromazine as the only standing and PRN antipsychotic medication.
Patient was transferred from [facility] [place] because of depressed mood with suicidal ideations.
Mr. Zarate seen with 1:1 at bedside. He stated he felt better and he stated he did not need of the 1 to 1 constant observation so I discontinued constant observation about noon, but a couple of hours later he went to the nurses station to tell nurses he felt he could hurt himself again, so I to 1 constant observation was restored.  One thing that has been reported by staff is that he wants to get placement and disability.  His behavior has been very inconsistent and when confronted he states he "did not do or say something, but it was his other personality."
Mr. Zarate stated again, that he felt better today and he wanted to go to rehab. He stated he did not need of the 1 to 1 constant observation so I discontinued constant observation before 11 am today. He stated he has not been asking for benzodiazepines, but nurses gave him PRN doses of Ativan yesterday because he was anxious. He states he does not need that anymore. He denies side effects to his medications.  Rehab referral accepted him and he can is eligible for admission there tomorrow morning.
Mr. Zarate seen with 1:1 at bedside. He reports sleeping well, feeling "a little better," but that he will try to kill himself if he is left alone, and that he may try regardless of 1:1 present. Denies seeing or hearing anything unusual. Denies actual plan for self-harm    Per nursing staff, requests 1:1 to be discontinued so he may kill himself. High anxious.
Mr. Zarate seen with 1:1 at bedside. He reports sleeping well, feeling "a little better," I don't want to live any more" and that he may try regardless of 1:1 present. Denies seeing or hearing anything unusual. Denies actual plan for self-harm    Per nursing staff, requests 1:1 to be discontinued so he may kill himself. High anxious.

## 2022-11-15 NOTE — PROGRESS NOTE BEHAVIORAL HEALTH - NSBHATTESTBILLING_PSY_A_CORE
[id number] Inpatient care - moderate complexity - 25 minutes
[id number]-Jmzqvgbquz Inpatient care - moderate complexity - 25 minutes
[id number]-Inbfdaoytq Inpatient care - moderate complexity - 25 minutes
[id number]-Wwatbdpzzh Inpatient care - moderate complexity - 25 minutes
[id number]-Rhxytigjjm Inpatient care - moderate complexity - 25 minutes
[id number]-Cycvhajcfz Inpatient care - low complexity - 15 minutes
[id number]-Miuyxankiw Inpatient care - moderate complexity - 25 minutes
[id number]-Iuzgeaohjb Inpatient care - low complexity - 15 minutes

## 2022-11-15 NOTE — PROGRESS NOTE BEHAVIORAL HEALTH - AXIS III
Hypertension, Obesity

## 2022-11-15 NOTE — PROGRESS NOTE BEHAVIORAL HEALTH - NSBHCHARTREVIEWINVESTIGATE_PSY_A_CORE FT
Ventricular Rate 76 BPM    Atrial Rate 76 BPM    P-R Interval 134 ms    QRS Duration 90 ms    Q-T Interval 364 ms    QTC Calculation(Bazett) 409 ms    P Axis 14 degrees    R Axis 7 degrees    T Axis 45 degrees    Diagnosis Line Normal sinus rhythm  Normal ECG  No previous ECGs available  Confirmed by Hayley FIGUEREDO, Dilip ([id number]) on 10/25/2022 11:14:12 AM
Ventricular Rate 76 BPM    Atrial Rate 76 BPM    P-R Interval 134 ms    QRS Duration 90 ms    Q-T Interval 364 ms    QTC Calculation(Bazett) 409 ms    P Axis 14 degrees    R Axis 7 degrees    T Axis 45 degrees    Diagnosis Line Normal sinus rhythm  Normal ECG  No previous ECGs available  Confirmed by Hayley FIGUEREDO, Dilip ([id number]) on 10/25/2022 11:14:12 AM
Ventricular Rate 76 BPM    Atrial Rate 76 BPM    P-R Interval 134 ms    QRS Duration 90 ms    Q-T Interval 364 ms    QTC Calculation(Bazett) 409 ms    P Axis 14 degrees    R Axis 7 degrees    T Axis 45 degrees    Diagnosis Line Normal sinus rhythm  Normal ECG  No previous ECGs available  Confirmed by Hayley FIGUEREDO, Dilip ([id number]) on 10/25/2022 11:14:12 AM
Ventricular Rate 76 BPM    Atrial Rate 76 BPM    P-R Interval 134 ms    QRS Duration 90 ms    Q-T Interval 364 ms    QTC Calculation(Bazett) 409 ms    P Axis 14 degrees    R Axis 7 degrees    T Axis 45 degrees    Diagnosis Line Normal sinus rhythm  Normal ECG  No previous ECGs available  Confirmed by Hayley FIGUEREDO, Dilip ([id number]) on 10/25/2022 11:14:12 AM
Ventricular Rate 76 BPM    Atrial Rate 76 BPM    P-R Interval 134 ms    QRS Duration 90 ms    Q-T Interval 364 ms    QTC Calculation(Bazett) 409 ms    P Axis 14 degrees    R Axis 7 degrees    T Axis 45 degrees    Diagnosis Line Normal sinus rhythm  Normal ECG  No previous ECGs available  Confirmed by Hayley FIGUEREDO, Dilip ([id number]) on 10/25/2022 11:14:12 AM
Ventricular Rate 76 BPM    Atrial Rate 76 BPM    P-R Interval 134 ms    QRS Duration 90 ms    Q-T Interval 364 ms    QTC Calculation(Bazett) 409 ms    P Axis 14 degrees    R Axis 7 degrees    T Axis 45 degrees    Diagnosis Line Normal sinus rhythm  Normal ECG  No previous ECGs available  Confirmed by Hayley FIGUEREDO, Dilip ([id number]) on 10/25/2022 11:14:12 AM
Ventricular Rate 76 BPM    Atrial Rate 76 BPM    P-R Interval 134 ms    QRS Duration 90 ms    Q-T Interval 364 ms    QTC Calculation(Bazett) 409 ms    P Axis 14 degrees    R Axis 7 degrees    T Axis 45 degrees    Diagnosis Line Normal sinus rhythm  Normal ECG  No previous ECGs available  Confirmed by Hayley FIGUEREDO, Dilip ([id number]) on 10/25/2022 11:14:12 AM
Ventricular Rate 76 BPM    Atrial Rate 76 BPM    P-R Interval 134 ms    QRS Duration 90 ms    Q-T Interval 364 ms    QTC Calculation(Bazett) 409 ms    P Axis 14 degrees    R Axis 7 degrees    T Axis 45 degrees    Diagnosis Line Normal sinus rhythm  Normal ECG  No previous ECGs available  Confirmed by Hayley FIGUEREDO, Dilip ([id number]) on 10/25/2022 11:14:12 AM

## 2022-11-15 NOTE — PROGRESS NOTE BEHAVIORAL HEALTH - NSBHCHARTREVIEWLAB_PSY_A_CORE FT
Comprehensive Metabolic Panel (11.07.22 @ 15:34)   Sodium, Serum: 141 mmol/L   Potassium, Serum: 3.8 mmol/L   Chloride, Serum: 107 mmol/L   Carbon Dioxide, Serum: 27 mmol/L   Anion Gap, Serum: 7 mmol/L   Blood Urea Nitrogen, Serum: 10 mg/dL   Creatinine, Serum: 1.12 mg/dL   Glucose, Serum: 90 mg/dL   Calcium, Total Serum: 8.9 mg/dL   Protein Total, Serum: 6.9 gm/dL   Albumin, Serum: 3.4 g/dL   Bilirubin Total, Serum: 0.4 mg/dL   Alkaline Phosphatase, Serum: 71 U/L   Aspartate Aminotransferase (AST/SGOT): 24 U/L   Alanine Aminotransferase (ALT/SGPT): 50 U/L   eGFR: 83    Complete Blood Count + Automated Diff (11.07.22 @ 15:34)   WBC Count: 7.35 K/uL   RBC Count: 4.40 M/uL   Hemoglobin: 13.6 g/dL   Hematocrit: 40.0 %   Mean Cell Volume: 90.9 fl   Mean Cell Hemoglobin: 30.9 pg   Mean Cell Hemoglobin Conc: 34.0 g/dL   Red Cell Distrib Width: 12.7 %   Platelet Count - Automated: 157 K/uL   Auto Neutrophil #: 4.59 K/uL   Auto Lymphocyte #: 1.94 K/uL   Auto Monocyte #: 0.47 K/uL   Auto Eosinophil #: 0.23 K/uL   Auto Basophil #: 0.07 K/uL     Lithium Level, Serum (11.03.22 @ 05:30)   Lithium Level, Serum: .50 mmoL/L

## 2022-11-15 NOTE — PROGRESS NOTE BEHAVIORAL HEALTH - PRN MEDS
Ativan 1 mg PO PRN severe anxiety this morning at 10:46
ATIVAN prn 1 MG AT 10 AM AND AT 1800 HOURS even when he states he does not want benzodiazepines.
ATIVAN prn 1 MG AT 10 AM AND AT 1800 HOURS even when he states he does not want benzodiazepines.
Ativan 1 mg PO PRN severe anxiety this morning at 10:46

## 2022-11-16 VITALS
SYSTOLIC BLOOD PRESSURE: 124 MMHG | HEART RATE: 71 BPM | RESPIRATION RATE: 20 BRPM | DIASTOLIC BLOOD PRESSURE: 71 MMHG | TEMPERATURE: 98 F

## 2022-11-16 DIAGNOSIS — F32.9 MAJOR DEPRESSIVE DISORDER, SINGLE EPISODE, UNSPECIFIED: ICD-10-CM

## 2022-11-16 DIAGNOSIS — F19.959 OTHER PSYCHOACTIVE SUBSTANCE USE, UNSPECIFIED WITH PSYCHOACTIVE SUBSTANCE-INDUCED PSYCHOTIC DISORDER, UNSPECIFIED: ICD-10-CM

## 2022-11-16 DIAGNOSIS — F19.10 OTHER PSYCHOACTIVE SUBSTANCE ABUSE, UNCOMPLICATED: ICD-10-CM

## 2022-11-16 PROCEDURE — 99239 HOSP IP/OBS DSCHRG MGMT >30: CPT

## 2022-11-16 RX ORDER — LITHIUM CARBONATE 300 MG/1
2 TABLET, EXTENDED RELEASE ORAL
Qty: 60 | Refills: 0
Start: 2022-11-16 | End: 2022-12-15

## 2022-11-16 RX ORDER — FOLIC ACID 0.8 MG
1 TABLET ORAL
Qty: 30 | Refills: 0
Start: 2022-11-16 | End: 2022-12-15

## 2022-11-16 RX ORDER — CHLORPROMAZINE HCL 10 MG
1 TABLET ORAL
Qty: 30 | Refills: 0
Start: 2022-11-16 | End: 2022-12-15

## 2022-11-16 RX ORDER — GABAPENTIN 400 MG/1
1 CAPSULE ORAL
Qty: 90 | Refills: 0
Start: 2022-11-16 | End: 2022-12-15

## 2022-11-16 RX ORDER — ALBUTEROL 90 UG/1
2 AEROSOL, METERED ORAL
Qty: 1 | Refills: 0
Start: 2022-11-16 | End: 2022-12-15

## 2022-11-16 RX ORDER — CHLORPROMAZINE HCL 10 MG
3 TABLET ORAL
Qty: 90 | Refills: 0
Start: 2022-11-16 | End: 2022-12-15

## 2022-11-16 RX ORDER — PREGABALIN 225 MG/1
1 CAPSULE ORAL
Qty: 30 | Refills: 0
Start: 2022-11-16 | End: 2022-12-15

## 2022-11-16 RX ORDER — ASPIRIN/CALCIUM CARB/MAGNESIUM 324 MG
1 TABLET ORAL
Qty: 30 | Refills: 0
Start: 2022-11-16 | End: 2022-12-15

## 2022-11-16 RX ORDER — LOSARTAN POTASSIUM 100 MG/1
1 TABLET, FILM COATED ORAL
Qty: 30 | Refills: 0
Start: 2022-11-16 | End: 2022-12-15

## 2022-11-16 RX ORDER — SERTRALINE 25 MG/1
1 TABLET, FILM COATED ORAL
Qty: 30 | Refills: 0
Start: 2022-11-16 | End: 2022-12-15

## 2022-11-16 RX ORDER — CLONAZEPAM 1 MG
1 TABLET ORAL
Qty: 60 | Refills: 0
Start: 2022-11-16 | End: 2022-12-15

## 2022-11-16 RX ORDER — NICOTINE POLACRILEX 2 MG
1 GUM BUCCAL
Qty: 30 | Refills: 0
Start: 2022-11-16 | End: 2022-12-15

## 2022-11-16 RX ORDER — LORATADINE 10 MG/1
1 TABLET ORAL
Qty: 30 | Refills: 0
Start: 2022-11-16 | End: 2022-12-15

## 2022-11-16 RX ORDER — PANTOPRAZOLE SODIUM 20 MG/1
1 TABLET, DELAYED RELEASE ORAL
Qty: 30 | Refills: 0
Start: 2022-11-16 | End: 2022-12-15

## 2022-11-16 RX ORDER — TRAZODONE HCL 50 MG
1 TABLET ORAL
Qty: 30 | Refills: 0
Start: 2022-11-16 | End: 2022-12-15

## 2022-11-16 RX ADMIN — GABAPENTIN 400 MILLIGRAM(S): 400 CAPSULE ORAL at 08:17

## 2022-11-16 RX ADMIN — PANTOPRAZOLE SODIUM 40 MILLIGRAM(S): 20 TABLET, DELAYED RELEASE ORAL at 08:16

## 2022-11-16 RX ADMIN — Medication 1 MILLIGRAM(S): at 08:17

## 2022-11-16 RX ADMIN — Medication 1 PATCH: at 06:35

## 2022-11-16 RX ADMIN — LOSARTAN POTASSIUM 50 MILLIGRAM(S): 100 TABLET, FILM COATED ORAL at 08:16

## 2022-11-16 RX ADMIN — SERTRALINE 50 MILLIGRAM(S): 25 TABLET, FILM COATED ORAL at 08:16

## 2022-11-16 RX ADMIN — LORATADINE 10 MILLIGRAM(S): 10 TABLET ORAL at 08:16

## 2022-11-16 RX ADMIN — Medication 81 MILLIGRAM(S): at 08:17

## 2022-11-16 RX ADMIN — Medication 50 MILLIGRAM(S): at 08:17

## 2022-11-16 NOTE — DISCHARGE NOTE BEHAVIORAL HEALTH - FAMILY HISTORY OF PSYCHIATRIC ILLNESS
He is  and has 3 children (15, 14, 3). The 4y/o lives at home with current wife and older two children live with his ex-wife. He is employed as a  at [employer] in [employer] department. Patient identifies as heterosexual. Prior to admission, patient was domiciled in a sober house, and had been sober for over two months. He is  and has 3 children (15, 14, 3). The 4y/o lives at home with current wife and older two children live with his ex-wife. He is employed as a  at [employer] in [employer] department. Patient identifies as heterosexual.  History of mood disorder and anxiety disorder in the family.

## 2022-11-16 NOTE — DISCHARGE NOTE BEHAVIORAL HEALTH - REASON FOR ADMISSION
patient b\b self discharged from [facility] 4 days ago after attempt at cutting wrist patient states he rushed leaving wasn't ready.

## 2022-11-16 NOTE — CHART NOTE - NSCHARTNOTEFT_GEN_A_CORE
Pts CT head performed:    CT Head No Cont (11.15.22 @ 19:47)     IMPRESSION:  No acute intracranial pathology.

## 2022-11-16 NOTE — CHART NOTE - NSCHARTNOTEFT_GEN_A_CORE
Social Work Discharge Note:    Patient is for discharge today.   He is alert and oriented x3.  Mood is improved.  Anxiety has decreased.  Insight and judgment have improved.  Suicidal / homicidal ideation denied.      Patient will be discharged to [facility].  He is aware and agreeable to same.     Discharge huddle was held prior to discharge to discuss discharge plan and referral for continued mental health treatment in outpatient setting.  No safety concerns were verbalized at that time.        Patient confirmed phone number [phone].    Patient is aware and agreeable to discharge today.

## 2022-11-16 NOTE — CHART NOTE - NSCHARTNOTESELECT_GEN_ALL_CORE
Event Note
Event Note
Unwitnessed fall in patients Bedroom/Event Note
Event Note

## 2022-11-16 NOTE — DISCHARGE NOTE BEHAVIORAL HEALTH - NS MD DC FALL RISK RISK
For information on Fall & Injury Prevention, visit: [email or web address] OR  [email or web address] OR  https://www.cdc.gov/steadi/patient.html

## 2022-11-16 NOTE — DISCHARGE NOTE BEHAVIORAL HEALTH - NSBHDCMEDICALFT_PSY_A_CORE
Patient had a-fib had cardioversion done. Patient has history of A-fib that was treated with cardioversion.

## 2022-11-16 NOTE — DISCHARGE NOTE BEHAVIORAL HEALTH - NSBHDCMEDSFT_PSY_A_CORE
Chlorpromazine PO 50mg every morning with good results.  Chlorpromazine PO 150mg at bedtime with good results.  Sertraline PO 50mg every morning with good results.  Gabapentin PO 400mg three times a day with good results.  Eskalith-CR PO 900mg at bedtime with good results.  Clonazepam PO 1mg every 12 hours with good results.  Nicotine Patch 21mg daily with good results.  Nicotine Polacrilex Gum 2mg PRN every 4 hours with good results.

## 2022-11-16 NOTE — DISCHARGE NOTE BEHAVIORAL HEALTH - NSBHDCSUICFCTROTHERFT_PSY_A_CORE
Financial and interpersonal stressors. He gets stressed out talking to some family members, especially his ex-wife.

## 2022-11-16 NOTE — DISCHARGE NOTE BEHAVIORAL HEALTH - PAST PSYCHIATRIC HISTORY
He has been taking psychotropic medications on and off his entire life. He states he has been taking psychotropic medications on and off his entire life. He has history of multiple inpatient psychiatric admissions last in 10/24/22-11/3/22 at [facility]. Patient has history of suicide attempts by cutting wrist and a  prior to admission. Also, patient has a history of NSSIB. He states he attended his first outpatient psychiatric appointment at [facility]. He states he has been taking psychotropic medications on and off his entire life. He has history of multiple inpatient psychiatric admissions last in 10/24/22-11/3/22 at [facility]. Patient has history of suicide attempts by cutting wrist [most superficial, but he states once he required of stitches] and his last attempt was prior to this admission. Patient has a history of Non Suicidal Self-Injurious Behavior.

## 2022-11-16 NOTE — DISCHARGE NOTE BEHAVIORAL HEALTH - MEDICATION SUMMARY - MEDICATIONS TO CHANGE
I will SWITCH the dose or number of times a day I take the medications listed below when I get home from the hospital:    gabapentin 300 mg oral capsule  -- 1 cap(s) by mouth 3 times a day    lithium 300 mg oral capsule  -- 3 cap(s) by mouth once a day (at bedtime)    traZODone 50 mg oral tablet  -- 1 tab(s) by mouth once a day (at bedtime)

## 2022-11-16 NOTE — DISCHARGE NOTE BEHAVIORAL HEALTH - NSBHDCSUICFCTRSFT_PSY_A_CORE
His mood was treated with psychotropic medications changes and adjustments, and while he felt suicidal he was under one to one observation.

## 2022-11-16 NOTE — DISCHARGE NOTE BEHAVIORAL HEALTH - NSBHDCDXVALIDYESFT_PSY_A_CORE
Bipolar disorder was not validated because of his hx of abuse of mood-altering drugs since age 17 together with his history of depressed and anxious mood. No clear history of any manic or hypomanic episode without a substance abuse period of at least one or two months. Because of that diagnoses changed to Major Depressive Disorder (unipolar depression), Anxiety Disorder, Psychotic Disorder substance induced, and Polysubstance abuse. Bipolar disorder was not validated because of his hx of abuse of mood-altering drugs since age 17 together with his history of depressed and anxious mood. No clear history of any manic or hypomanic episode without a substance abuse period of at least one or two months. Because of that diagnoses changed to Mood Disorder unspecified, Anxiety Disorder, Psychotic Disorder substance induced, and Polysubstance abuse.

## 2022-11-16 NOTE — DISCHARGE NOTE BEHAVIORAL HEALTH - NSBHDCSUBSTHXFT_PSY_A_CORE
He been abusing drugs and alcohol and suffering from depression since he was 17 years old.  And has been smoking two packs per day of cigarettes since age 17 years old. He been abusing drugs and alcohol and suffering from depression since he was 17 years old.  Also, he has been smoking two packs per day of cigarettes since that age. He been abusing drugs and alcohol and suffering from depression since he was 17 years old.  Also, he has been smoking two to 2 1/2 packs per day of cigarettes since that age. Patient endorsed hx of 4L/day of hard liquor for 6 years, snorting "6-10 grams" of cocaine daily for 6 years, and remote hx of marijuana and mushrooms. History of numerous detox/ rehabs (alcohol/cocaine).

## 2022-11-16 NOTE — DISCHARGE NOTE BEHAVIORAL HEALTH - HPI (INCLUDE ILLNESS QUALITY, SEVERITY, DURATION, TIMING, CONTEXT, MODIFYING FACTORS, ASSOCIATED SIGNS AND SYMPTOMS)
Patient is a 47 years old male who has long history of mood and anxiety disorders and substance abuse. He was living in a sober house, and had been sober for over two months. Patient is a 47 years old male who has long history of mood and anxiety disorders and substance abuse, who was transferred from [facility] after evaluation of suicide ideation and psychosis. At the time of assessment in the emergency department, patient reported command auditory hallucination and suicidal ideation with plan to slit his wrists in setting of recent interrupted attempt to cut his wrist with a safety razor. Patient states that he was recently discharged from [facility] with dx of bipolar disorder. Since discharge [from [facility]], pt reports having CAH (a voice that sounds like his own), VH of a doppelganger, worsening thoughts of self-harm and unstable mood. He reported that he has been having these symptoms since he went sober off of ETOH and cocaine. He stated that today [] he cut his wrist with a safety razor in the setting of hearing CAH with his own voice telling him to hurt himself, and that he intended to kill himself, but was stopped by a friend. [on ] Patient endorsed some acute symptoms of depression (sleeplessness, guilt/worthlessness,  energy, impaired concentration,  appetite, and PMA), some acute symptoms of timbo (distractibility, significant energy level, increased activity, sleeplessness endorses recently talkativeness), symptoms anxiety(restlessness and fidgeting throughout the interview), and symptoms of psychosis (hearing CAH at time of exam to cause pain to himself but not kill himself and seeing "bugs," similar to gnats at time of exam). Denies acute symptoms of PTSD."   He attended his first outpatient psychiatric appointment at [facility].  History of suicidality or self- injurious behaviors – History of suicide attempt by cutting wrist.      Patient denies recent use of alcohol, nicotine, marijuana, or any illicit substances over the past 64 days. Patient endorsed hx of 4L/day of hard liquor for 6 years, snorting "6-10 grams" of cocaine daily for 6 years, and remote hx of marijuana and mushrooms. Also, he states he smokes 2 to 2 and 1/2 PPD of cigarettes since he was 17 years old. History of numerous detox/ rehabs (alcohol/cocaine). Patient denies any substance use in 64 days.  Prior to admission, patient was domiciled in a sober house.   History of mood disorder and anxiety disorder in the family.  He states he smokes 2 to 2 and 1/2 PPD of cigarettes since he was 17 years old.            He was living in a sober house, and had been sober for over two months. Patient is a 47 years old male who has long history of mood and anxiety disorders and substance abuse, who was transferred from [facility] after evaluation of suicide ideation and psychosis. At the time of assessment in the emergency department, patient reported command auditory hallucination and suicidal ideation with plan to slit his wrists in setting of recent interrupted attempt to cut his wrist with a safety razor. Patient states that he was recently discharged from [facility] with dx of bipolar disorder. Since discharge [from [facility]], pt reports having CAH (a voice that sounds like his own), VH of a doppelganger, worsening thoughts of self-harm and unstable mood. He reported that he has been having these symptoms since he went sober off of ETOH and cocaine. He stated that today [] he cut his wrist with a safety razor in the setting of hearing CAH with his own voice telling him to hurt himself, and that he intended to kill himself, but was stopped by a friend. [on ] Patient endorsed some acute symptoms of depression (sleeplessness, guilt/worthlessness,  energy, impaired concentration,  appetite, and PMA), some acute symptoms of timbo (distractibility, significant energy level, increased activity, sleeplessness endorses recently talkativeness), symptoms anxiety(restlessness and fidgeting throughout the interview), and symptoms of psychosis (hearing CAH at time of exam to cause pain to himself but not kill himself and seeing "bugs," similar to gnats at time of exam). Denies acute symptoms of PTSD."

## 2022-11-16 NOTE — DISCHARGE NOTE BEHAVIORAL HEALTH - SECONDARY DIAGNOSIS.
Polysubstance abuse Drug-induced psychotic disorder, with unspecified complication Severe alcohol use disorder, in early remission Anxiety disorder, unspecified Hypertension

## 2022-11-16 NOTE — DISCHARGE NOTE BEHAVIORAL HEALTH - NSBHDCTESTSFT_PSY_A_CORE
Lithium Level, Serum (11.13.22 @ 09:00)   Lithium Level, Serum: 0.90 mmol/L     Comprehensive Metabolic Panel (11.07.22 @ 15:34)   Sodium, Serum: 141 mmol/L   Potassium, Serum: 3.8 mmol/L   Chloride, Serum: 107 mmol/L   Carbon Dioxide, Serum: 27 mmol/L   Anion Gap, Serum: 7 mmol/L   Blood Urea Nitrogen, Serum: 10 mg/dL   Creatinine, Serum: 1.12 mg/dL   Glucose, Serum: 90 mg/dL   Calcium, Total Serum: 8.9 mg/dL   Protein Total, Serum: 6.9 gm/dL   Albumin, Serum: 3.4 g/dL   Bilirubin Total, Serum: 0.4 mg/dL   Alkaline Phosphatase, Serum: 71 U/L   Aspartate Aminotransferase (AST/SGOT): 24 U/L   Alanine Aminotransferase (ALT/SGPT): 50 U/L   eGFR: 83    Complete Blood Count + Automated Diff (11.07.22 @ 15:34)      WBC Count: 7.35 K/uL      RBC Count: 4.40 M/uL      Hemoglobin: 13.6 g/dL      Hematocrit: 40.0 %      Platelet Count - Automated: 157 K/uL      Auto Neutrophil %: 62.7: Differential percentages must be correlated with absolute numbers for      clinical significance. %      Auto Lymphocyte %: 26.5 %      Auto Monocyte %: 6.4 %      Auto Eosinophil %: 3.1 %      Auto Basophil %: 1.0 %

## 2022-11-16 NOTE — DISCHARGE NOTE BEHAVIORAL HEALTH - MEDICATION SUMMARY - MEDICATIONS TO TAKE
I will START or STAY ON the medications listed below when I get home from the hospital:    aspirin 81 mg oral tablet, chewable  -- 1 tab(s) by mouth once a day x 30 days Continue until told otherwise by MD  -- Indication: For History of Atrial Fibrillation    losartan 50 mg oral tablet  -- 1 tab(s) by mouth once a day x 30 days Continue until told otherwise by MD  -- Indication: For Hypertension    gabapentin 400 mg oral capsule  -- 1 cap(s) by mouth 3 times a day x 30 days Continue until told otherwise by MD  -- Indication: For Mood disorder    clonazePAM 1 mg oral tablet  -- 1 tab(s) by mouth every 12 hours x 30 days  Continue until told otherwise by MD MDD:2mg To be tapered down to discontinue it.  -- Indication: For Anxiety disorder, unspecified    sertraline 50 mg oral tablet  -- 1 tab(s) by mouth once a day x 30 days Continue until told otherwise by MD  -- Indication: For Major depression, recurrent, chronic    traZODone 50 mg oral tablet  -- 1 tab(s) by mouth once a day (at bedtime) x 30 days, As Needed -insomnia Continue until told otherwise by MD  -- Indication: For Anxiety disorder, unspecified and Insomnia    chlorproMAZINE 50 mg oral tablet  -- 3 tab(s) by mouth once a day (at bedtime) x 30 days   Continue until told otherwise by MD  -- Indication: For Mood disorder    chlorproMAZINE 50 mg oral tablet  -- 1 tab(s) by mouth once a day x 30 days Continue until told otherwise by MD  -- Indication: For Mood disorder    loratadine 10 mg oral tablet  -- 1 tab(s) by mouth once a day x 30 days  Continue until told otherwise by MD   -- Indication: For Environmental allergies    lithium 450 mg oral tablet, extended release  -- 2 tab(s) by mouth once a day (at bedtime) x 30 days  Continue until told otherwise by MD   -- Indication: For Mood disorder    albuterol 90 mcg/inh inhalation aerosol  -- 2 puff(s) inhaled every 6 hours x 30 days, As Needed -SOB   -- Indication: For History of asthma    pantoprazole 40 mg oral delayed release tablet  -- 1 tab(s) by mouth once a day (before a meal) x 30 days Continue until told otherwise by MD  -- Indication: For GERD    nicotine 21 mg/24 hr transdermal film, extended release  -- 1 patch by transdermal patch once a day x 30 days Continue until told otherwise by MD  -- Indication: For Nicotine dependence    Multiple Vitamins oral tablet  -- 1 tab(s) by mouth once a day x 30 days Continue until told otherwise by MD  -- Indication: For Severe alcohol use disorder, in early remission    cyanocobalamin 1000 mcg oral tablet  -- 1 tab(s) by mouth once a day x 30 days Continue until told otherwise by MD  -- Indication: For Severe alcohol use disorder, in early remission    folic acid 1 mg oral tablet  -- 1 tab(s) by mouth once a day x 30 days Continue until told otherwise by MD  -- Indication: For Severe alcohol use disorder, in early remission

## 2022-11-20 DIAGNOSIS — I48.0 PAROXYSMAL ATRIAL FIBRILLATION: ICD-10-CM

## 2022-11-20 DIAGNOSIS — I10 ESSENTIAL (PRIMARY) HYPERTENSION: ICD-10-CM

## 2022-11-20 DIAGNOSIS — E66.9 OBESITY, UNSPECIFIED: ICD-10-CM

## 2022-11-20 DIAGNOSIS — F17.210 NICOTINE DEPENDENCE, CIGARETTES, UNCOMPLICATED: ICD-10-CM

## 2022-11-20 DIAGNOSIS — Z28.21 IMMUNIZATION NOT CARRIED OUT BECAUSE OF PATIENT REFUSAL: ICD-10-CM

## 2022-11-20 DIAGNOSIS — F10.21 ALCOHOL DEPENDENCE, IN REMISSION: ICD-10-CM

## 2022-11-20 DIAGNOSIS — G47.00 INSOMNIA, UNSPECIFIED: ICD-10-CM

## 2022-11-20 DIAGNOSIS — F41.9 ANXIETY DISORDER, UNSPECIFIED: ICD-10-CM

## 2022-11-20 DIAGNOSIS — F31.9 BIPOLAR DISORDER, UNSPECIFIED: ICD-10-CM

## 2022-11-20 DIAGNOSIS — F14.20 COCAINE DEPENDENCE, UNCOMPLICATED: ICD-10-CM

## 2022-11-20 DIAGNOSIS — R45.851 SUICIDAL IDEATIONS: ICD-10-CM

## 2022-11-20 DIAGNOSIS — F33.9 MAJOR DEPRESSIVE DISORDER, RECURRENT, UNSPECIFIED: ICD-10-CM

## 2022-11-20 DIAGNOSIS — Z91.51 PERSONAL HISTORY OF SUICIDAL BEHAVIOR: ICD-10-CM

## 2022-11-20 DIAGNOSIS — Z91.013 ALLERGY TO SEAFOOD: ICD-10-CM

## 2022-11-20 DIAGNOSIS — K21.9 GASTRO-ESOPHAGEAL REFLUX DISEASE WITHOUT ESOPHAGITIS: ICD-10-CM

## 2023-11-20 NOTE — ED BEHAVIORAL HEALTH ASSESSMENT NOTE - EYE CONTACT
[facility]   Cardiac Follow Up     Referring Provider:  GERMAN Sanderson     Chief Complaint   Patient presents with    Hypertension    Hyperlipidemia    6 Month Follow-Up     f/up for aortic stenosis    History of Present Illness:  Olimpia Huff is a 78 y.o. female her. She has a history of hypertension, hyperlipidemia, and diabetes. [facility] 10/2019 with normal coronaries. She had covid pna Dec 26  (2021), she was admitted twice total- she got up to 11L O2. She has portable O2 in case she needs it. She was vaccinated, no booster yet. She stated she was diagnosed with a restrictive lung disease, post covid. Today she is here for 6 mos follow up. Reports that she had bronchitis a couple of weeks ago and then a little over a week ago she was taken to the hospital and her BP was high. She is having \"more frequent pain in the chest.\"  She does have arthritis and has had for a long time and it is hard for her to tell if the pain is heart related or the arthritis. Patient denies exertional chest pain, MIKE/PND, palpitations, light-headedness, edema. Past Medical History:   has a past medical history of Asthma, Diabetes mellitus ([address]), Hyperlipidemia, and Hypertension. Surgical History:   has a past surgical history that includes Hysterectomy, total abdominal and Elbow surgery. Social History:   reports that she has never smoked. She has never used smokeless tobacco. She reports that she does not drink alcohol and does not use drugs. Family History:  family history includes Heart Disease in her brother and father. Home Medications:  Prior to Admission medications    Medication Sig Start Date End Date Taking?  Authorizing Provider   BIOTIN PO Take by mouth   Yes ProviderMeenakshi MD   Multiple Vitamins-Minerals (STRESS B-COMPLEX/C/ZINC) TABS TAKE 1 TABLET BY MOUTH ONCE DAILY 5/1/22  Yes Meenakshi Martinez MD   loratadine (CLARITIN) 10 MG tablet TAKE 1 TABLET BY MOUTH [place]

## 2024-01-07 NOTE — PROGRESS NOTE BEHAVIORAL HEALTH - AFFECT CONGRUENCE
Congruent
DISPLAY PLAN FREE TEXT
Congruent
DISPLAY PLAN FREE TEXT
Congruent
DISPLAY PLAN FREE TEXT
Congruent
DISPLAY PLAN FREE TEXT
DISPLAY PLAN FREE TEXT
Congruent
Congruent

## 2024-02-23 NOTE — PATIENT PROFILE BEHAVIORAL HEALTH - FALL HARM RISK - UNIVERSAL INTERVENTIONS
Discharge Summary       PATIENT ID: Ashanti Doe  MRN: [id number]   YOB: 1985    DATE OF ADMISSION: 2/22/2024  1:35 PM    DATE OF DISCHARGE: ***   PRIMARY CARE PROVIDER: No primary care provider on file.     ATTENDING PHYSICIAN: ***  DISCHARGING PROVIDER: Finn Skinner MD    To contact this individual call [phone] and ask the  to page.  If unavailable ask to be transferred the Adult Hospitalist Department.    CONSULTATIONS: IP CONSULT TO DIABETES MANAGEMENT  IP CONSULT TO HOSPITALIST    PROCEDURES/SURGERIES: * No surgery found *     ADMITTING DIAGNOSES & HOSPITAL COURSE:   ***        DISCHARGE DIAGNOSES / PLAN:       ***       PENDING TEST RESULTS:   At the time of discharge the following test results are still pending: ***    FOLLOW UP APPOINTMENTS:    Follow-up Information       Follow up With Specialties Details Why Contact Info    Nick Cruz MD Hepatology, Gastroenterology Schedule an appointment as soon as possible for a visit in 3 week(s) Elevated liver enzymes; [address] [place], [place] [id number] [phone]                ADDITIONAL CARE RECOMMENDATIONS: ***    DIET: {diet:81805}  Oral Nutritional Supplements: {NUTRITION SUPPLEMENTS:893446}{Frequencies; times a day:93595}    ACTIVITY: {discharge activity:09871}    WOUND CARE: ***    EQUIPMENT needed: ***      DISCHARGE MEDICATIONS:     Medication List        START taking these medications      blood glucose test strips strip  Commonly known as: ASCENSIA AUTODISC VI;ONE TOUCH ULTRA TEST VI  1 each by In Vitro route daily As needed.     Dexcom G7 Sensor Misc  Utilize to monitor blood sugar 3-4 times daily     insulin glargine 100 UNIT/ML injection pen  Commonly known as: LANTUS;BASAGLAR  Inject 16 Units into the skin daily Okay to substitute Basaglar or Semglee or Rezvoglar or Glargine-yfgn.     insulin lispro (1 Unit Dial) 100 UNIT/ML Sopn  Commonly known as: HumaLOG KwikPen  Cover each meal at 1 units  soft/ Non tender, non distended, BS physiological,   Ext: no clubbing, no cyanosis, no edema, brisk 2+ DP pulses  Neuro/Psych: pleasant mood and affect, CN 2-12 grossly intact, sensory grossly within normal limit, Strength 5/5 in all extremities  Skin: warm     CHRONIC MEDICAL DIAGNOSES:  Principal Problem:    DKA, type 1, not at goal (HCC)  Active Problems:    Type 1 diabetes mellitus with hyperglycemia (HCC)    Nausea and vomiting    Diabetic ketoacidosis without coma associated with type 1 diabetes mellitus (HCC)  Resolved Problems:    * No resolved hospital problems. *        Greater than 31 minutes were spent with the patient on counseling and coordination of care    Signed:   Finn Skinner MD  2/23/2024  1:29 PM      Bed in lowest position, wheels locked, appropriate side rails in place/Call bell, personal items and telephone in reach/Instruct patient to call for assistance before getting out of bed or chair/Non-slip footwear when patient is out of bed/[place] to call system/Physically safe environment - no spills, clutter or unnecessary equipment/Purposeful Proactive Rounding/Room/bathroom lighting operational, light cord in reach

## 2024-04-13 ENCOUNTER — INPATIENT (INPATIENT)
Facility: HOSPITAL | Age: 49
LOS: 2 days | Discharge: PSYCHIATRIC OR SUBSTANCE USE FACILITY | End: 2024-04-16
Attending: STUDENT IN AN ORGANIZED HEALTH CARE EDUCATION/TRAINING PROGRAM | Admitting: STUDENT IN AN ORGANIZED HEALTH CARE EDUCATION/TRAINING PROGRAM
Payer: MEDICAID

## 2024-04-13 VITALS
HEART RATE: 61 BPM | SYSTOLIC BLOOD PRESSURE: 151 MMHG | OXYGEN SATURATION: 97 % | RESPIRATION RATE: 15 BRPM | DIASTOLIC BLOOD PRESSURE: 90 MMHG | TEMPERATURE: 98 F

## 2024-04-13 PROCEDURE — 99285 EMERGENCY DEPT VISIT HI MDM: CPT

## 2024-04-13 NOTE — ED PROVIDER NOTE - PHYSICAL EXAMINATION
General: Alert and Orientated x 3. No apparent distress.  Head: Normocephalic and atraumatic.  Eyes: PERRLA with EOMI.  Neck: Supple. Trachea midline.   Cardiac: Normal S1 and S2 w/ RRR. No murmurs appreciated. No JVD appreciated.  Pulmonary: CTA bilaterally. No increased WOB. No wheezes or crackles.  Abdominal: Soft, non-tender. (+) bowel sounds appreciated in all 4 quadrants. No hepatosplenomegaly.   Neurologic: No focal sensory or motor deficits.  Musculoskeletal: Strength appropriate in all 4 extremities for age with no limited ROM.  Skin: Color appropriate for race. Intact, warm, and well-perfused. no clonus/.   Psychiatric: Active SI, no pressured speech, no tangenital thoughts.

## 2024-04-13 NOTE — ED ADULT TRIAGE NOTE - CHIEF COMPLAINT QUOTE
alert oriented found in motel patient called PD after taking 30 20mg Lexapro tabs PD called EMS  c/o feeling drowsy hx depression anxiety

## 2024-04-13 NOTE — ED PROVIDER NOTE - CLINICAL SUMMARY MEDICAL DECISION MAKING FREE TEXT BOX
49 y/o M w/ hx of Bipolar Disorder w/ psychotic freatures, Suicide attempts presenting w/ overdose of lexapro. entir ebottle empty. Also took 6 tabs of lithium unkown dosage. Asymptatic at this time. hx of multiple attempts. +hallucinations. Will get psych labs, lithium level, CO, tox consult. On exam low c/g serotonin syndrome as not clonus. EKG with ., atc 449. HR 57.

## 2024-04-13 NOTE — ED PROVIDER NOTE - OBJECTIVE STATEMENT
Patient is a 47 y/o M w/ hx of Bipolar Disorder w/ psychotic freatures, Suicide attempts who presents to the ED with Suicide attempt. [facility] 20[facility] (30 tabs). Also took 6 tabs from another Lithium bottle, unknown dosage. Also on Trazdone (29/30 tabs), Lithium 300mg (57/60), Lithium 150mg (59/60). Unopen papliperidone 1mg, benztropine 9mg 57/60. States demons are telling him to kill himsel and sees demons. Slit wrists in past. No chest pain, fever, chills.. nausea. vomiting, urinayr or bowel changes. undomicilled. d/c'd [facility] 4/10/24. Patient is a 47 y/o M w/ hx of Bipolar Disorder w/ psychotic freatures, Suicide attempts who presents to the ED with Suicide attempt. [facility] (30 tabs). Also took 6 tabs from another Lithium bottle, unknown dosage. Also on Trazdone (29/30 tabs), Lithium 300mg (57/60), Lithium 150mg (59/60). Unopen papliperidone 1mg, benztropine 9mg 57/60. States demons are telling him to kill himsel and sees demons. Slit wrists in past. No chest pain, fever, chills.. nausea. vomiting, urinayr or bowel changes. undomicilled. d/c'd [facility] 4/10/24.    Attendinyo male presents s/p intentional drug overdose.  pt was hearing voices telling him to kill himself.  pt took 30 lexapro tabs.  also took about 6-7 lithium tabs.

## 2024-04-13 NOTE — ED PROVIDER NOTE - PROGRESS NOTE DETAILS
Devin Baker MD (PGY3): Tox consulted, labs pending. HDS stable. Devin Baker MD (PGY3): labs non-actionable. Per tox, require 24 hr obs on tele. Spoke to hospitalist endorsed to them.

## 2024-04-13 NOTE — ED ADULT NURSE NOTE - NSFALLUNIVINTERV_ED_ALL_ED
Bed/Stretcher in lowest position, wheels locked, appropriate side rails in place/Call bell, personal items and telephone in reach/Instruct patient to call for assistance before getting out of bed/chair/stretcher/Non-slip footwear applied when patient is off stretcher/[place] to call system/Physically safe environment - no spills, clutter or unnecessary equipment/Purposeful proactive rounding/Room/bathroom lighting operational, light cord in reach

## 2024-04-14 DIAGNOSIS — T50.902A POISONING BY UNSPECIFIED DRUGS, MEDICAMENTS AND BIOLOGICAL SUBSTANCES, INTENTIONAL SELF-HARM, INITIAL ENCOUNTER: ICD-10-CM

## 2024-04-14 DIAGNOSIS — F31.9 BIPOLAR DISORDER, UNSPECIFIED: ICD-10-CM

## 2024-04-14 DIAGNOSIS — Z79.899 OTHER LONG TERM (CURRENT) DRUG THERAPY: ICD-10-CM

## 2024-04-14 DIAGNOSIS — T43.221A POISONING BY SELECTIVE SEROTONIN REUPTAKE INHIBITORS, ACCIDENTAL (UNINTENTIONAL), INITIAL ENCOUNTER: ICD-10-CM

## 2024-04-14 DIAGNOSIS — Z29.9 ENCOUNTER FOR PROPHYLACTIC MEASURES, UNSPECIFIED: ICD-10-CM

## 2024-04-14 DIAGNOSIS — F32.9 MAJOR DEPRESSIVE DISORDER, SINGLE EPISODE, UNSPECIFIED: ICD-10-CM

## 2024-04-14 DIAGNOSIS — T14.91XA SUICIDE ATTEMPT, INITIAL ENCOUNTER: ICD-10-CM

## 2024-04-14 PROBLEM — E66.9 OBESITY, UNSPECIFIED: Chronic | Status: ACTIVE | Noted: 2022-11-08

## 2024-04-14 PROBLEM — I10 ESSENTIAL (PRIMARY) HYPERTENSION: Chronic | Status: ACTIVE | Noted: 2022-11-08

## 2024-04-14 LAB
ALBUMIN SERPL ELPH-MCNC: 4 G/DL — SIGNIFICANT CHANGE UP (ref 3.3–5)
ALBUMIN SERPL ELPH-MCNC: 4 G/DL — SIGNIFICANT CHANGE UP (ref 3.3–5)
ALP SERPL-CCNC: 71 U/L — SIGNIFICANT CHANGE UP (ref 40–120)
ALP SERPL-CCNC: 74 U/L — SIGNIFICANT CHANGE UP (ref 40–120)
ALT FLD-CCNC: 18 U/L — SIGNIFICANT CHANGE UP (ref 4–41)
ALT FLD-CCNC: 18 U/L — SIGNIFICANT CHANGE UP (ref 4–41)
AMPHET UR-MCNC: NEGATIVE — SIGNIFICANT CHANGE UP
ANION GAP SERPL CALC-SCNC: 12 MMOL/L — SIGNIFICANT CHANGE UP (ref 7–14)
ANION GAP SERPL CALC-SCNC: 13 MMOL/L — SIGNIFICANT CHANGE UP (ref 7–14)
APAP SERPL-MCNC: <10 UG/ML — LOW (ref 15–25)
APPEARANCE UR: CLEAR — SIGNIFICANT CHANGE UP
AST SERPL-CCNC: 17 U/L — SIGNIFICANT CHANGE UP (ref 4–40)
AST SERPL-CCNC: 18 U/L — SIGNIFICANT CHANGE UP (ref 4–40)
BARBITURATES UR SCN-MCNC: NEGATIVE — SIGNIFICANT CHANGE UP
BASE EXCESS BLDV CALC-SCNC: 1 MMOL/L — SIGNIFICANT CHANGE UP (ref -2–3)
BASOPHILS # BLD AUTO: 0.06 K/UL — SIGNIFICANT CHANGE UP (ref 0–0.2)
BASOPHILS # BLD AUTO: 0.07 K/UL — SIGNIFICANT CHANGE UP (ref 0–0.2)
BASOPHILS NFR BLD AUTO: 0.9 % — SIGNIFICANT CHANGE UP (ref 0–2)
BASOPHILS NFR BLD AUTO: 1 % — SIGNIFICANT CHANGE UP (ref 0–2)
BENZODIAZ UR-MCNC: NEGATIVE — SIGNIFICANT CHANGE UP
BILIRUB SERPL-MCNC: 0.3 MG/DL — SIGNIFICANT CHANGE UP (ref 0.2–1.2)
BILIRUB SERPL-MCNC: 0.6 MG/DL — SIGNIFICANT CHANGE UP (ref 0.2–1.2)
BILIRUB UR-MCNC: NEGATIVE — SIGNIFICANT CHANGE UP
BLOOD GAS VENOUS COMPREHENSIVE RESULT: SIGNIFICANT CHANGE UP
BUN SERPL-MCNC: 10 MG/DL — SIGNIFICANT CHANGE UP (ref 7–23)
BUN SERPL-MCNC: 10 MG/DL — SIGNIFICANT CHANGE UP (ref 7–23)
CALCIUM SERPL-MCNC: 8.8 MG/DL — SIGNIFICANT CHANGE UP (ref 8.4–10.5)
CALCIUM SERPL-MCNC: 9 MG/DL — SIGNIFICANT CHANGE UP (ref 8.4–10.5)
CHLORIDE BLDV-SCNC: 105 MMOL/L — SIGNIFICANT CHANGE UP (ref 96–108)
CHLORIDE SERPL-SCNC: 105 MMOL/L — SIGNIFICANT CHANGE UP (ref 98–107)
CHLORIDE SERPL-SCNC: 106 MMOL/L — SIGNIFICANT CHANGE UP (ref 98–107)
CK SERPL-CCNC: 382 U/L — HIGH (ref 30–200)
CO2 BLDV-SCNC: 28 MMOL/L — HIGH (ref 22–26)
CO2 SERPL-SCNC: 21 MMOL/L — LOW (ref 22–31)
CO2 SERPL-SCNC: 23 MMOL/L — SIGNIFICANT CHANGE UP (ref 22–31)
COCAINE METAB.OTHER UR-MCNC: NEGATIVE — SIGNIFICANT CHANGE UP
COLOR SPEC: YELLOW — SIGNIFICANT CHANGE UP
CREAT SERPL-MCNC: 0.99 MG/DL — SIGNIFICANT CHANGE UP (ref 0.5–1.3)
CREAT SERPL-MCNC: 1.01 MG/DL — SIGNIFICANT CHANGE UP (ref 0.5–1.3)
CREATININE URINE RESULT, DAU: 48 MG/DL — SIGNIFICANT CHANGE UP
DIFF PNL FLD: NEGATIVE — SIGNIFICANT CHANGE UP
EGFR: 93 ML/MIN/1.73M2 — SIGNIFICANT CHANGE UP
EGFR: 95 ML/MIN/1.73M2 — SIGNIFICANT CHANGE UP
EOSINOPHIL # BLD AUTO: 0.27 K/UL — SIGNIFICANT CHANGE UP (ref 0–0.5)
EOSINOPHIL # BLD AUTO: 0.3 K/UL — SIGNIFICANT CHANGE UP (ref 0–0.5)
EOSINOPHIL NFR BLD AUTO: 3.9 % — SIGNIFICANT CHANGE UP (ref 0–6)
EOSINOPHIL NFR BLD AUTO: 4.6 % — SIGNIFICANT CHANGE UP (ref 0–6)
ETHANOL SERPL-MCNC: <10 MG/DL — SIGNIFICANT CHANGE UP
GAS PNL BLDV: 137 MMOL/L — SIGNIFICANT CHANGE UP (ref 136–145)
GLUCOSE BLDV-MCNC: 86 MG/DL — SIGNIFICANT CHANGE UP (ref 70–99)
GLUCOSE SERPL-MCNC: 100 MG/DL — HIGH (ref 70–99)
GLUCOSE SERPL-MCNC: 88 MG/DL — SIGNIFICANT CHANGE UP (ref 70–99)
GLUCOSE UR QL: NEGATIVE MG/DL — SIGNIFICANT CHANGE UP
HCO3 BLDV-SCNC: 27 MMOL/L — SIGNIFICANT CHANGE UP (ref 22–29)
HCT VFR BLD CALC: 39.5 % — SIGNIFICANT CHANGE UP (ref 39–50)
HCT VFR BLD CALC: 43.2 % — SIGNIFICANT CHANGE UP (ref 39–50)
HCT VFR BLDA CALC: 40 % — SIGNIFICANT CHANGE UP (ref 39–51)
HGB BLD CALC-MCNC: 13.3 G/DL — SIGNIFICANT CHANGE UP (ref 12.6–17.4)
HGB BLD-MCNC: 13.6 G/DL — SIGNIFICANT CHANGE UP (ref 13–17)
HGB BLD-MCNC: 14.6 G/DL — SIGNIFICANT CHANGE UP (ref 13–17)
IANC: 3.64 K/UL — SIGNIFICANT CHANGE UP (ref 1.8–7.4)
IANC: 4.8 K/UL — SIGNIFICANT CHANGE UP (ref 1.8–7.4)
IMM GRANULOCYTES NFR BLD AUTO: 0.2 % — SIGNIFICANT CHANGE UP (ref 0–0.9)
IMM GRANULOCYTES NFR BLD AUTO: 0.4 % — SIGNIFICANT CHANGE UP (ref 0–0.9)
KETONES UR-MCNC: NEGATIVE MG/DL — SIGNIFICANT CHANGE UP
LACTATE BLDV-MCNC: 1 MMOL/L — SIGNIFICANT CHANGE UP (ref 0.5–2)
LEUKOCYTE ESTERASE UR-ACNC: NEGATIVE — SIGNIFICANT CHANGE UP
LITHIUM SERPL-MCNC: 0.5 MMOL/L — LOW (ref 0.6–1.2)
LITHIUM SERPL-MCNC: 0.7 MMOL/L — SIGNIFICANT CHANGE UP (ref 0.6–1.2)
LYMPHOCYTES # BLD AUTO: 1.57 K/UL — SIGNIFICANT CHANGE UP (ref 1–3.3)
LYMPHOCYTES # BLD AUTO: 1.92 K/UL — SIGNIFICANT CHANGE UP (ref 1–3.3)
LYMPHOCYTES # BLD AUTO: 25 % — SIGNIFICANT CHANGE UP (ref 13–44)
LYMPHOCYTES # BLD AUTO: 26.7 % — SIGNIFICANT CHANGE UP (ref 13–44)
MAGNESIUM SERPL-MCNC: 2.3 MG/DL — SIGNIFICANT CHANGE UP (ref 1.6–2.6)
MCHC RBC-ENTMCNC: 30.2 PG — SIGNIFICANT CHANGE UP (ref 27–34)
MCHC RBC-ENTMCNC: 30.8 PG — SIGNIFICANT CHANGE UP (ref 27–34)
MCHC RBC-ENTMCNC: 33.8 GM/DL — SIGNIFICANT CHANGE UP (ref 32–36)
MCHC RBC-ENTMCNC: 34.4 GM/DL — SIGNIFICANT CHANGE UP (ref 32–36)
MCV RBC AUTO: 89.3 FL — SIGNIFICANT CHANGE UP (ref 80–100)
MCV RBC AUTO: 89.4 FL — SIGNIFICANT CHANGE UP (ref 80–100)
METHADONE UR-MCNC: NEGATIVE — SIGNIFICANT CHANGE UP
MONOCYTES # BLD AUTO: 0.32 K/UL — SIGNIFICANT CHANGE UP (ref 0–0.9)
MONOCYTES # BLD AUTO: 0.56 K/UL — SIGNIFICANT CHANGE UP (ref 0–0.9)
MONOCYTES NFR BLD AUTO: 5.5 % — SIGNIFICANT CHANGE UP (ref 2–14)
MONOCYTES NFR BLD AUTO: 7.3 % — SIGNIFICANT CHANGE UP (ref 2–14)
NEUTROPHILS # BLD AUTO: 3.64 K/UL — SIGNIFICANT CHANGE UP (ref 1.8–7.4)
NEUTROPHILS # BLD AUTO: 4.8 K/UL — SIGNIFICANT CHANGE UP (ref 1.8–7.4)
NEUTROPHILS NFR BLD AUTO: 62 % — SIGNIFICANT CHANGE UP (ref 43–77)
NEUTROPHILS NFR BLD AUTO: 62.5 % — SIGNIFICANT CHANGE UP (ref 43–77)
NITRITE UR-MCNC: NEGATIVE — SIGNIFICANT CHANGE UP
NRBC # BLD: 0 /100 WBCS — SIGNIFICANT CHANGE UP (ref 0–0)
NRBC # BLD: 0 /100 WBCS — SIGNIFICANT CHANGE UP (ref 0–0)
NRBC # FLD: 0 K/UL — SIGNIFICANT CHANGE UP (ref 0–0)
NRBC # FLD: 0 K/UL — SIGNIFICANT CHANGE UP (ref 0–0)
OPIATES UR-MCNC: NEGATIVE — SIGNIFICANT CHANGE UP
OXYCODONE UR-MCNC: NEGATIVE — SIGNIFICANT CHANGE UP
PCO2 BLDV: 45 MMHG — SIGNIFICANT CHANGE UP (ref 42–55)
PCP SPEC-MCNC: SIGNIFICANT CHANGE UP
PCP UR-MCNC: NEGATIVE — SIGNIFICANT CHANGE UP
PH BLDV: 7.38 — SIGNIFICANT CHANGE UP (ref 7.32–7.43)
PH UR: 7.5 — SIGNIFICANT CHANGE UP (ref 5–8)
PHOSPHATE SERPL-MCNC: 3.2 MG/DL — SIGNIFICANT CHANGE UP (ref 2.5–4.5)
PLATELET # BLD AUTO: 188 K/UL — SIGNIFICANT CHANGE UP (ref 150–400)
PLATELET # BLD AUTO: 209 K/UL — SIGNIFICANT CHANGE UP (ref 150–400)
PO2 BLDV: 44 MMHG — SIGNIFICANT CHANGE UP (ref 25–45)
POTASSIUM BLDV-SCNC: 3.5 MMOL/L — SIGNIFICANT CHANGE UP (ref 3.5–5.1)
POTASSIUM SERPL-MCNC: 3.8 MMOL/L — SIGNIFICANT CHANGE UP (ref 3.5–5.3)
POTASSIUM SERPL-MCNC: 4.2 MMOL/L — SIGNIFICANT CHANGE UP (ref 3.5–5.3)
POTASSIUM SERPL-SCNC: 3.8 MMOL/L — SIGNIFICANT CHANGE UP (ref 3.5–5.3)
POTASSIUM SERPL-SCNC: 4.2 MMOL/L — SIGNIFICANT CHANGE UP (ref 3.5–5.3)
PROT SERPL-MCNC: 6.3 G/DL — SIGNIFICANT CHANGE UP (ref 6–8.3)
PROT SERPL-MCNC: 6.5 G/DL — SIGNIFICANT CHANGE UP (ref 6–8.3)
PROT UR-MCNC: NEGATIVE MG/DL — SIGNIFICANT CHANGE UP
RBC # BLD: 4.42 M/UL — SIGNIFICANT CHANGE UP (ref 4.2–5.8)
RBC # BLD: 4.84 M/UL — SIGNIFICANT CHANGE UP (ref 4.2–5.8)
RBC # FLD: 13.2 % — SIGNIFICANT CHANGE UP (ref 10.3–14.5)
RBC # FLD: 13.6 % — SIGNIFICANT CHANGE UP (ref 10.3–14.5)
SALICYLATES SERPL-MCNC: <0.3 MG/DL — LOW (ref 15–30)
SAO2 % BLDV: 77.2 % — SIGNIFICANT CHANGE UP (ref 67–88)
SODIUM SERPL-SCNC: 140 MMOL/L — SIGNIFICANT CHANGE UP (ref 135–145)
SODIUM SERPL-SCNC: 140 MMOL/L — SIGNIFICANT CHANGE UP (ref 135–145)
SP GR SPEC: 1.01 — SIGNIFICANT CHANGE UP (ref 1–1.03)
THC UR QL: NEGATIVE — SIGNIFICANT CHANGE UP
UROBILINOGEN FLD QL: 1 MG/DL — SIGNIFICANT CHANGE UP (ref 0.2–1)
WBC # BLD: 5.87 K/UL — SIGNIFICANT CHANGE UP (ref 3.8–10.5)
WBC # BLD: 7.68 K/UL — SIGNIFICANT CHANGE UP (ref 3.8–10.5)
WBC # FLD AUTO: 5.87 K/UL — SIGNIFICANT CHANGE UP (ref 3.8–10.5)
WBC # FLD AUTO: 7.68 K/UL — SIGNIFICANT CHANGE UP (ref 3.8–10.5)

## 2024-04-14 PROCEDURE — 99223 1ST HOSP IP/OBS HIGH 75: CPT

## 2024-04-14 PROCEDURE — 93010 ELECTROCARDIOGRAM REPORT: CPT

## 2024-04-14 PROCEDURE — 99451 NTRPROF PH1/NTRNET/EHR 5/>: CPT

## 2024-04-14 NOTE — H&P ADULT - ASSESSMENT
48M with hx of Bipolar Disorder with psychotic features and prior suicide attempts who presents after suicide attempt with drug ingestion (lexapro and lithium).

## 2024-04-14 NOTE — H&P ADULT - HISTORY OF PRESENT ILLNESS
48M with hx of Bipolar Disorder with psychotic features and prior suicide attempts who presents after suicide attempt with drug ingestion (lexapro and lithium). Patient was in a motel, was feeling depressed and took entire bottle of Lexapro (30 tabs of 20mg pills) and Lithium (6 tabs unknown dose). Patient was feeling drowsy and called EMS. Patient reports longstanding difficulty with bipolar disorder, multiple hospitalizations, most recently discharged from [facility] 4/10. He denies EtOH use, drug use. No fevers, chills, cp, sob, abdominal pain, n/v/d, dysuria, sick contacts, recent travel.          48M with hx of Bipolar Disorder with psychotic features and prior suicide attempts who presents after suicide attempt with drug ingestion (lexapro and lithium). Patient was in a motel, was feeling depressed and took entire bottle of Lexapro (30 tabs of 20mg pills) and Lithium (6 tabs unknown dose). Patient was feeling drowsy and called EMS. Patient reports longstanding difficulty with bipolar disorder, multiple hospitalizations, most recently discharged from [facility] 4/10. He denies EtOH use, drug use. No fevers, chills, cp, sob, abdominal pain, n/v/d, dysuria, sick contacts, recent travel.

## 2024-04-14 NOTE — H&P ADULT - PROBLEM SELECTOR PLAN 1
presents after drug ingestion, denies GI symptoms  - patient took entire bottle of Lexapro 20mg (30 tabs), 6 tabs from Lithium bottle (unknown dose)  - denies EtOH use - EtOH level negative, Utox negative   - appreciate tox recs: low concern for clinically significant ingestion. lithium level 0.7, repeat now 6-8 hours post initial blood draw, monitor on telemetry, EKG q4-6 hours to monitor QTc (currently 444ms)  - constant observation

## 2024-04-14 NOTE — CONSULT NOTE ADULT - SUBJECTIVE AND OBJECTIVE BOX
MEDICAL TOXICOLOGY CONSULT    HPI:  48M presents after reported intentional ingestion of a about a bottle of escitalopram and 6 tablets of unknown formulation of lithium at 9-10 pm on 24. Denies acute symptoms. Denies any other ingestants.  Meds: trazodone, benztropine, escitalopram, lithium, brnztropine, paliperidone. Tablets are with pt, pt only missing tablets from escitalopram rx    Vitals: HR 86, /90, RR 16, sat 99% RA, afebrile  EKG:  (prior in  ), QTc 449  Exam: AAO3, normal pupillary exam, no nystagmus, no diaphoresis/flushed skin/tremors/clonus/rigidity  Labs: Lithium 0.7, blood gas reassuring, LFT’s/Kidney function reassuring, co-ingestants negative      Toxicology consulted for ingestion of escitalopram/lithium      PAST MEDICAL & SURGICAL HISTORY:  Hypertension      Obesity          MEDICATION HISTORY:      FAMILY HISTORY:      REVIEW OF SYSTEMS:   _____unable to perform due to intoxication, dementia, or illness      Vital Signs Last 24 Hrs  T(C): 36.8 (2024 06:28), Max: 36.9 (2024 01:52)  T(F): 98.2 (2024 06:28), Max: 98.4 (2024 01:52)  HR: 56 (2024 06:28) (50 - 61)  BP: 142/76 (2024 06:28) (129/92 - 151/90)  BP(mean): --  RR: 18 (2024 06:28) (15 - 18)  SpO2: 100% (2024 06:28) (97% - 100%)    Parameters below as of 2024 06:28  Patient On (Oxygen Delivery Method): room air        SIGNIFICANT LABORATORY STUDIES:                        13.6   7.68  )-----------( 209      ( 2024 23:48 )             39.5       04-13    140  |  105  |  10  ----------------------------<  88  3.8   |  23  |  0.99    Ca    9.0      2024 23:48  Phos  3.2     -  Mg     2.30         TPro  6.5  /  Alb  4.0  /  TBili  0.3  /  DBili  x   /  AST  17  /  ALT  18  /  AlkPhos  74  04-          Urinalysis Basic - ( 2024 23:48 )    Color: Yellow / Appearance: Clear / S.010 / pH: x  Gluc: 88 mg/dL / Ketone: Negative mg/dL  / Bili: Negative / Urobili: 1.0 mg/dL   Blood: x / Protein: Negative mg/dL / Nitrite: Negative   Leuk Esterase: Negative / RBC: x / WBC x   Sq Epi: x / Non Sq Epi: x / Bacteria: x        Anion Gap: --  @ 23:54  CK: --  @ 23:54  Troponin:  --   @ 23:54  Pro-BNP:  --   23:54  VB   @ 23:54  Carboxyhemoglobin %:  --  :54  Methemoglobin %:  --   @ 23:54  Osmolality Serum:  --   @ 23:54  Aspirin Level: --   @ 23:54  Acetaminophen Level:  --   @ 23:54  Ethanol Level:  --  :54  Digoxin Level:  --   @ 23:54  Phenytoin Level:  --   @ 23:54  Carbamazepine level:  --   @ 23:54  Lamotrigine level:  --   @ 23:54  Anion Gap:  @ 23:48  CK: 382<H>  @ 23:48  Troponin:  --   @ 23:48  Pro-BNP:  --   @ 23:48  VBG:  --   23:48  Carboxyhemoglobin %:  --  :48  Methemoglobin %:  --   @ 23:48  Osmolality Serum:  --   23:48  Aspirin Level: <0.3<L>   @ 23:48  Acetaminophen Level:  <10<L>   @ 23:48  Ethanol Level:  --   23:48  Digoxin Level:  --   @ 23:48  Phenytoin Level:  --  04-13 @ 23:48  Carbamazepine level:  --   @ 23:48  Lamotrigine level:  --   @ 23:48

## 2024-04-14 NOTE — ED ADULT NURSE REASSESSMENT NOTE - NS ED NURSE REASSESS COMMENT FT1
patient resting comfortably on stretcher. denies any pain, Not in acute distress. A+O x 4, safety maintained. Will continue to monitor.

## 2024-04-14 NOTE — H&P ADULT - NSHPPHYSICALEXAM_GEN_ALL_CORE
Vital Signs Last 24 Hrs  T(C): 36.8 (14 Apr 2024 06:28), Max: 36.9 (14 Apr 2024 01:52)  T(F): 98.2 (14 Apr 2024 06:28), Max: 98.4 (14 Apr 2024 01:52)  HR: 56 (14 Apr 2024 06:28) (50 - 61)  BP: 142/76 (14 Apr 2024 06:28) (129/92 - 151/90)  BP(mean): --  RR: 18 (14 Apr 2024 06:28) (15 - 18)  SpO2: 100% (14 Apr 2024 06:28) (97% - 100%)    Parameters below as of 14 Apr 2024 06:28  Patient On (Oxygen Delivery Method): room air        CONSTITUTIONAL: resting in bed comfortably   EYES: no conjunctival or scleral injection, non-icteric, PERRLA  ENMT: no external nasal lesions, oral mucosa with moist membranes  NECK: trachea midline, no palpable neck mass   RESPIRATORY: breathing comfortably, lungs CTA without wheeze/rales/rhonchi  CARDIOVASCULAR: regular rate and rhythm; +S1S2, no murmurs, rubs, or gallops, no lower extremity edema, 2+ peripheral pulses  GASTROINTESTINAL: soft, nontender, nondistended; +BS throughout, no rebound/guarding  MUSCULOSKELETAL: no joint effusions, normal strength and tone of extremities   NEUROLOGIC: non-focal, sensation intact to light touch in b/l upper and lower extremities   PSYCHIATRIC: AAOx3, appropriate mood and affect  SKIN: no rashes or lesions, warm

## 2024-04-14 NOTE — H&P ADULT - PROBLEM SELECTOR PLAN 3
patient unsure of medications/doses  - medications found on patient belongings - recorded on storage receipt (in paper chart) however does not indicate frequency, currently being held by pharmacy  - med storage list meds: Trazadone 100mg, Omeprazole 40mg, Paliperidone ER 9mg, Lithium 150mg/300mg, Benztropine 1mg, Escitalopram 20mg   - discrepancies exist between medication storage receipt list and [facility] prescription list   - med rec pharmacist emailed to assist with list - follow up

## 2024-04-14 NOTE — H&P ADULT - NSHPLABSRESULTS_GEN_ALL_CORE
13.6   7.68  )-----------( 209      ( 13 Apr 2024 23:48 )             39.5     04-13    140  |  105  |  10  ----------------------------<  88  3.8   |  23  |  0.99    Ca    9.0      13 Apr 2024 23:48  Phos  3.2     04-13  Mg     2.30     04-13    TPro  6.5  /  Alb  4.0  /  TBili  0.3  /  DBili  x   /  AST  17  /  ALT  18  /  AlkPhos  74  04-13

## 2024-04-14 NOTE — PHARMACOTHERAPY INTERVENTION NOTE - COMMENTS
Medication history is incomplete. Unable to verify patient's medication list with two sources. OMR updated from patient's medication vials. Will follow up with pt to confirm medications. Please call spectra [phone] if you have any questions.  Medication history is incomplete. Unable to verify patient's medication list with two sources. OMR updated from patient's medication vials. Please call spectra [phone] if you have any questions.

## 2024-04-14 NOTE — H&P ADULT - PROBLEM SELECTOR PLAN 2
prior suicide attempts and hospitalizations  - patient unsure of medications he takes and their doses  - holding meds pending 24 hours of monitoring post ingestion/psychiatry evaluation  -  consulted - to be seen Monday 4/15 AM - check CBC with diff/CMP/Lithium level in AM  - constant observation

## 2024-04-15 LAB
ALBUMIN SERPL ELPH-MCNC: 3.7 G/DL — SIGNIFICANT CHANGE UP (ref 3.3–5)
ALP SERPL-CCNC: 67 U/L — SIGNIFICANT CHANGE UP (ref 40–120)
ALT FLD-CCNC: 14 U/L — SIGNIFICANT CHANGE UP (ref 4–41)
ANION GAP SERPL CALC-SCNC: 11 MMOL/L — SIGNIFICANT CHANGE UP (ref 7–14)
AST SERPL-CCNC: 11 U/L — SIGNIFICANT CHANGE UP (ref 4–40)
BASOPHILS # BLD AUTO: 0.08 K/UL — SIGNIFICANT CHANGE UP (ref 0–0.2)
BASOPHILS NFR BLD AUTO: 1.2 % — SIGNIFICANT CHANGE UP (ref 0–2)
BILIRUB DIRECT SERPL-MCNC: <0.2 MG/DL — SIGNIFICANT CHANGE UP (ref 0–0.3)
BILIRUB INDIRECT FLD-MCNC: >0.4 MG/DL — SIGNIFICANT CHANGE UP (ref 0–1)
BILIRUB SERPL-MCNC: 0.6 MG/DL — SIGNIFICANT CHANGE UP (ref 0.2–1.2)
BUN SERPL-MCNC: 11 MG/DL — SIGNIFICANT CHANGE UP (ref 7–23)
CALCIUM SERPL-MCNC: 8.7 MG/DL — SIGNIFICANT CHANGE UP (ref 8.4–10.5)
CHLORIDE SERPL-SCNC: 107 MMOL/L — SIGNIFICANT CHANGE UP (ref 98–107)
CO2 SERPL-SCNC: 22 MMOL/L — SIGNIFICANT CHANGE UP (ref 22–31)
CREAT SERPL-MCNC: 1.05 MG/DL — SIGNIFICANT CHANGE UP (ref 0.5–1.3)
EGFR: 89 ML/MIN/1.73M2 — SIGNIFICANT CHANGE UP
EOSINOPHIL # BLD AUTO: 0.33 K/UL — SIGNIFICANT CHANGE UP (ref 0–0.5)
EOSINOPHIL NFR BLD AUTO: 5.1 % — SIGNIFICANT CHANGE UP (ref 0–6)
GLUCOSE SERPL-MCNC: 84 MG/DL — SIGNIFICANT CHANGE UP (ref 70–99)
HCT VFR BLD CALC: 41.2 % — SIGNIFICANT CHANGE UP (ref 39–50)
HGB BLD-MCNC: 14.1 G/DL — SIGNIFICANT CHANGE UP (ref 13–17)
IANC: 3.87 K/UL — SIGNIFICANT CHANGE UP (ref 1.8–7.4)
IMM GRANULOCYTES NFR BLD AUTO: 0.2 % — SIGNIFICANT CHANGE UP (ref 0–0.9)
LITHIUM SERPL-MCNC: 0.3 MMOL/L — LOW (ref 0.6–1.2)
LYMPHOCYTES # BLD AUTO: 1.9 K/UL — SIGNIFICANT CHANGE UP (ref 1–3.3)
LYMPHOCYTES # BLD AUTO: 29.1 % — SIGNIFICANT CHANGE UP (ref 13–44)
MAGNESIUM SERPL-MCNC: 2.3 MG/DL — SIGNIFICANT CHANGE UP (ref 1.6–2.6)
MCHC RBC-ENTMCNC: 31 PG — SIGNIFICANT CHANGE UP (ref 27–34)
MCHC RBC-ENTMCNC: 34.2 GM/DL — SIGNIFICANT CHANGE UP (ref 32–36)
MCV RBC AUTO: 90.5 FL — SIGNIFICANT CHANGE UP (ref 80–100)
MONOCYTES # BLD AUTO: 0.34 K/UL — SIGNIFICANT CHANGE UP (ref 0–0.9)
MONOCYTES NFR BLD AUTO: 5.2 % — SIGNIFICANT CHANGE UP (ref 2–14)
NEUTROPHILS # BLD AUTO: 3.87 K/UL — SIGNIFICANT CHANGE UP (ref 1.8–7.4)
NEUTROPHILS NFR BLD AUTO: 59.2 % — SIGNIFICANT CHANGE UP (ref 43–77)
NRBC # BLD: 0 /100 WBCS — SIGNIFICANT CHANGE UP (ref 0–0)
NRBC # FLD: 0 K/UL — SIGNIFICANT CHANGE UP (ref 0–0)
PHOSPHATE SERPL-MCNC: 3.2 MG/DL — SIGNIFICANT CHANGE UP (ref 2.5–4.5)
PLATELET # BLD AUTO: 186 K/UL — SIGNIFICANT CHANGE UP (ref 150–400)
POTASSIUM SERPL-MCNC: 4 MMOL/L — SIGNIFICANT CHANGE UP (ref 3.5–5.3)
POTASSIUM SERPL-SCNC: 4 MMOL/L — SIGNIFICANT CHANGE UP (ref 3.5–5.3)
PROT SERPL-MCNC: 5.9 G/DL — LOW (ref 6–8.3)
RBC # BLD: 4.55 M/UL — SIGNIFICANT CHANGE UP (ref 4.2–5.8)
RBC # FLD: 13.4 % — SIGNIFICANT CHANGE UP (ref 10.3–14.5)
SODIUM SERPL-SCNC: 140 MMOL/L — SIGNIFICANT CHANGE UP (ref 135–145)
TSH SERPL-MCNC: 1.71 UIU/ML — SIGNIFICANT CHANGE UP (ref 0.27–4.2)
WBC # BLD: 6.53 K/UL — SIGNIFICANT CHANGE UP (ref 3.8–10.5)
WBC # FLD AUTO: 6.53 K/UL — SIGNIFICANT CHANGE UP (ref 3.8–10.5)

## 2024-04-15 PROCEDURE — 99232 SBSQ HOSP IP/OBS MODERATE 35: CPT

## 2024-04-15 PROCEDURE — 93010 ELECTROCARDIOGRAM REPORT: CPT | Mod: 76

## 2024-04-15 PROCEDURE — 93010 ELECTROCARDIOGRAM REPORT: CPT

## 2024-04-15 PROCEDURE — 90792 PSYCH DIAG EVAL W/MED SRVCS: CPT

## 2024-04-15 NOTE — PROGRESS NOTE ADULT - PROBLEM SELECTOR PLAN 4
DVT ppx: IMPROVE 0, no pharm ppx indicated  DIET: Regular  DISPO: inpt, needs [facility] admission when medically cleared DVT ppx: IMPROVE 0, no pharm ppx indicated  DIET: Regular  DISPO: inpt, needs [facility] admission, should be medically cleared tomorrow

## 2024-04-15 NOTE — PHARMACOTHERAPY INTERVENTION NOTE - COMMENTS
Medication history is complete. Medication list updated in Outpatient Medication Record (OMR). Please call spectra [phone] if you have any questions.

## 2024-04-15 NOTE — PROGRESS NOTE ADULT - SUBJECTIVE AND OBJECTIVE BOX
Dr. Sydney Ruth  Pager [phone]    PROGRESS NOTE:     Patient is a 48y old  Male who presents with a chief complaint of     SUBJECTIVE / OVERNIGHT EVENTS: pt confirms suicidal ideation by drug overdose  ADDITIONAL REVIEW OF SYSTEMS: tele stable, no chest pain/sob     MEDICATIONS  (STANDING):    MEDICATIONS  (PRN):      CAPILLARY BLOOD GLUCOSE        I&O's Summary      PHYSICAL EXAM:  Vital Signs Last 24 Hrs  T(C): 36.7 (15 Apr 2024 10:06), Max: 36.9 (14 Apr 2024 22:57)  T(F): 98.1 (15 Apr 2024 10:06), Max: 98.4 (14 Apr 2024 22:57)  HR: 40 (15 Apr 2024 10:06) (40 - 49)  BP: 113/79 (15 Apr 2024 10:06) (110/79 - 121/84)  BP(mean): --  RR: 16 (15 Apr 2024 10:06) (14 - 18)  SpO2: 97% (15 Apr 2024 10:06) (97% - 99%)    Parameters below as of 15 Apr 2024 10:06  Patient On (Oxygen Delivery Method): room air      CONSTITUTIONAL: nad   RESPIRATORY: Normal respiratory effort; lungs are clear to auscultation bilaterally  CARDIOVASCULAR: Regular rate and rhythm, normal S1 and S2, no murmur/rub/gallop; No lower extremity edema; Peripheral pulses are 2+ bilaterally  ABDOMEN: Nontender to palpation, normoactive bowel sounds, no rebound/guarding; No hepatosplenomegaly  MUSCULOSKELETAL: no clubbing or cyanosis of digits; no joint swelling or tenderness to palpation  PSYCH: A+O to person, place, and time; affect appropriate    LABS:                        14.1   6.53  )-----------( 186      ( 15 Apr 2024 06:30 )             41.2     04-15    140  |  107  |  11  ----------------------------<  84  4.0   |  22  |  1.05    Ca    8.7      15 Apr 2024 06:30  Phos  3.2     04-15  Mg     2.30     04-15    TPro  5.9<L>  /  Alb  3.7  /  TBili  0.6  /  DBili  <0.2  /  AST  11  /  ALT  14  /  AlkPhos  67  04-15      CARDIAC MARKERS ( 13 Apr 2024 23:48 )  x     / x     / 382 U/L / x     / x          Urinalysis Basic - ( 15 Apr 2024 06:30 )    Color: x / Appearance: x / SG: x / pH: x  Gluc: 84 mg/dL / Ketone: x  / Bili: x / Urobili: x   Blood: x / Protein: x / Nitrite: x   Leuk Esterase: x / RBC: x / WBC x   Sq Epi: x / Non Sq Epi: x / Bacteria: x      RADIOLOGY & ADDITIONAL TESTS:  Results Reviewed:   Imaging Personally Reviewed:    Electrocardiogram Personally Reviewed:    COORDINATION OF CARE:  Care Discussed with Consultants/Other Providers [Y/N]:  Prior or Outpatient Records Reviewed [Y/N]:

## 2024-04-15 NOTE — PROGRESS NOTE ADULT - PROBLEM SELECTOR PLAN 1
presents after drug ingestion, denies GI symptoms  - patient took entire bottle of Lexapro 20mg (30 tabs), 6 tabs from Lithium bottle (unknown dose)  - denies EtOH use - EtOH level negative, Utox negative   - appreciate tox recs: low concern for clinically significant ingestion. lithium level 0.7, repeat 6-8 hours Li level 0.7-->0.3,  monitor on telemetry, EKG q4-6 hours to monitor QTc (438ms--453ms)  -trend QTc  - constant observation presents after drug ingestion, denies GI symptoms  - patient took entire bottle of Lexapro 20mg (30 tabs), 6 tabs from Lithium bottle (unknown dose)  - denies EtOH use - EtOH level negative, Utox negative   - appreciate tox recs: low concern for clinically significant ingestion. lithium level 0.7, repeat 6-8 hours Li level 0.5-->0.3,  monitor on telemetry, EKG q4-6 hours to monitor QTc (438ms--453ms)  -trend QTc  - constant observation presents after drug ingestion, denies GI symptoms  - patient took entire bottle of Lexapro 20mg (30 tabs), 6 tabs from Lithium bottle (unknown dose)  - denies EtOH use - EtOH level negative, Utox negative   - appreciate tox recs: low concern for clinically significant ingestion. lithium level 0.7, repeat 6-8 hours Li level 0.5-->0.3,  monitor on telemetry, trend QTc (438ms--453ms)  -trend QTc, repeat EKG tomorrow am  - constant observation

## 2024-04-15 NOTE — PROVIDER CONTACT NOTE (OTHER) - ASSESSMENT
Patient is a&ox4. Patient is on tele monitoring. No complaints of any dizziness, weakness, chest pain or discomfort

## 2024-04-15 NOTE — PROGRESS NOTE ADULT - REASON FOR ADMISSION
Pt given pillow and blanket and repositioned for comfort.       Mariaelena Small RN  02/28/21 1841 suicidal attempt, drug overdose

## 2024-04-15 NOTE — PROGRESS NOTE ADULT - PROBLEM SELECTOR PLAN 2
prior suicide attempts and hospitalizations  - patient unsure of medications he takes and their doses  - holding meds pending 24 hours of monitoring post ingestion/psychiatry evaluation  -  consulted called, f/u recs  - constant observation prior suicide attempts and hospitalizations  - patient unsure of medications he takes and their doses  - holding meds pending 24 hours of monitoring post ingestion/psychiatry evaluation  -  consult appreciated, hold psych meds, will need transfer to [facility], should be medically cleared by tomorrow, f/u  and psych SW for [facility] tomorrow  - ativan 2mg q4h prn anxiety/agitation  - constant observation

## 2024-04-15 NOTE — BH CONSULTATION LIAISON ASSESSMENT NOTE - NSBHCHARTREVIEWVS_PSY_A_CORE FT
Vital Signs Last 24 Hrs  T(C): 36.7 (15 Apr 2024 10:06), Max: 36.9 (14 Apr 2024 22:57)  T(F): 98.1 (15 Apr 2024 10:06), Max: 98.4 (14 Apr 2024 22:57)  HR: 40 (15 Apr 2024 10:06) (40 - 49)  BP: 113/79 (15 Apr 2024 10:06) (110/79 - 121/84)  BP(mean): --  RR: 16 (15 Apr 2024 10:06) (14 - 18)  SpO2: 97% (15 Apr 2024 10:06) (97% - 99%)    Parameters below as of 15 Apr 2024 10:06  Patient On (Oxygen Delivery Method): room air

## 2024-04-15 NOTE — PROGRESS NOTE ADULT - PROBLEM SELECTOR PLAN 3
patient unsure of medications/doses  - medications found on patient belongings - recorded on storage receipt (in paper chart) however does not indicate frequency, currently being held by pharmacy  - med storage list meds: Trazadone 100mg, Omeprazole 40mg, Paliperidone ER 9mg, Lithium 150mg/300mg, Benztropine 1mg, Escitalopram 20mg   - discrepancies exist between medication storage receipt list and [facility] prescription list   - med rec pharmacist emailed to assist with list - follow up

## 2024-04-15 NOTE — BH CONSULTATION LIAISON ASSESSMENT NOTE - HPI (INCLUDE ILLNESS QUALITY, SEVERITY, DURATION, TIMING, CONTEXT, MODIFYING FACTORS, ASSOCIATED SIGNS AND SYMPTOMS)
Patient is a 48 man with with hx of Bipolar Disorder with psychotic features and prior suicide attempts who presents after suicide attempt, as per chart patient overdosed on lexapro and lithium. Consulted for SA.    Chart reviewed, pt. seen/evaluated, calm/cooperative, reports he has h/o bipolar disorder, poor historian in terms of his medications, reports " I took the whole bottle of one of my medication to kill myself" does not know which medication. States he has been hearing voices of demons telling him to kill himself since he got discharged from [facility] last week (admitted for SA). Reports ongoing SI but no intention or plan, Denies HIIP, denies voices are telling him to harm others. Denies feeling unsafe. Reports depressed mood and hopelessness. Reports he follow up with FACT team but does not have their number. Reports he was  from his wife 18 months ago, has been sober form Alcohol and cocaine sine last 18 months. Reports he has a girl friend Magi and sister Melonie (gave consent to speak with sister, GF and FACT team). Reports multiple SA in the past via overdosing.

## 2024-04-15 NOTE — BH CONSULTATION LIAISON ASSESSMENT NOTE - SUMMARY
Patient is a 48 man with with hx of Bipolar Disorder with psychotic features and prior suicide attempts who presents after suicide attempt, as per chart patient overdosed on lexapro and lithium. Consulted for SA.    Patient presenting with worsening depression/hopelessness, s/p SA via overdosing on his psychiatric medications,  poor historian in terms of his medications, ongoing SI and CAH of demon telling him to kill himself, denies HI. Follow up with FACT team but does not have their number.     PLAN:  1- CO 1:1  for Suicidality and CAH, no loose or sharp objects, low threshold to call security/restraints/PRN to keep patient/everyone else safe    2-HOLD psychiatric medications given current OD  3-Monitor qtc, Lithium level, TSH, bun/cr and monitor for serotonin syndrome.    4-Needs collateral from FACT team  5-Ativan 2mg PO/IM/IV q4 h prn for anxiety/agitation  6-Dispo:  needs an inpatient psychiatric admission once medically stabilized

## 2024-04-15 NOTE — BH CONSULTATION LIAISON ASSESSMENT NOTE - RISK ASSESSMENT
Risk factors; h/o bipolar, multiple sa, current SA, depression/hopelessness     Patient is high risk for  self harm

## 2024-04-16 ENCOUNTER — INPATIENT (INPATIENT)
Facility: HOSPITAL | Age: 49
LOS: 8 days | Discharge: HOME | End: 2024-04-25
Attending: STUDENT IN AN ORGANIZED HEALTH CARE EDUCATION/TRAINING PROGRAM | Admitting: STUDENT IN AN ORGANIZED HEALTH CARE EDUCATION/TRAINING PROGRAM
Payer: MEDICAID

## 2024-04-16 ENCOUNTER — TRANSCRIPTION ENCOUNTER (OUTPATIENT)
Age: 49
End: 2024-04-16

## 2024-04-16 VITALS
TEMPERATURE: 98 F | OXYGEN SATURATION: 100 % | DIASTOLIC BLOOD PRESSURE: 71 MMHG | SYSTOLIC BLOOD PRESSURE: 124 MMHG | HEART RATE: 55 BPM | RESPIRATION RATE: 18 BRPM

## 2024-04-16 VITALS — WEIGHT: 214.95 LBS | TEMPERATURE: 97 F | RESPIRATION RATE: 16 BRPM | HEIGHT: 74 IN

## 2024-04-16 DIAGNOSIS — F31.9 BIPOLAR DISORDER, UNSPECIFIED: ICD-10-CM

## 2024-04-16 LAB
ALBUMIN SERPL ELPH-MCNC: 3.9 G/DL — SIGNIFICANT CHANGE UP (ref 3.3–5)
ALP SERPL-CCNC: 67 U/L — SIGNIFICANT CHANGE UP (ref 40–120)
ALT FLD-CCNC: 16 U/L — SIGNIFICANT CHANGE UP (ref 4–41)
ANION GAP SERPL CALC-SCNC: 9 MMOL/L — SIGNIFICANT CHANGE UP (ref 7–14)
AST SERPL-CCNC: 13 U/L — SIGNIFICANT CHANGE UP (ref 4–40)
BASOPHILS # BLD AUTO: 0.07 K/UL — SIGNIFICANT CHANGE UP (ref 0–0.2)
BASOPHILS NFR BLD AUTO: 1 % — SIGNIFICANT CHANGE UP (ref 0–2)
BILIRUB SERPL-MCNC: 0.6 MG/DL — SIGNIFICANT CHANGE UP (ref 0.2–1.2)
BUN SERPL-MCNC: 12 MG/DL — SIGNIFICANT CHANGE UP (ref 7–23)
CALCIUM SERPL-MCNC: 9 MG/DL — SIGNIFICANT CHANGE UP (ref 8.4–10.5)
CHLORIDE SERPL-SCNC: 107 MMOL/L — SIGNIFICANT CHANGE UP (ref 98–107)
CO2 SERPL-SCNC: 25 MMOL/L — SIGNIFICANT CHANGE UP (ref 22–31)
CREAT SERPL-MCNC: 1.05 MG/DL — SIGNIFICANT CHANGE UP (ref 0.5–1.3)
EGFR: 89 ML/MIN/1.73M2 — SIGNIFICANT CHANGE UP
EOSINOPHIL # BLD AUTO: 0.29 K/UL — SIGNIFICANT CHANGE UP (ref 0–0.5)
EOSINOPHIL NFR BLD AUTO: 4.1 % — SIGNIFICANT CHANGE UP (ref 0–6)
GLUCOSE SERPL-MCNC: 87 MG/DL — SIGNIFICANT CHANGE UP (ref 70–99)
HCT VFR BLD CALC: 41.5 % — SIGNIFICANT CHANGE UP (ref 39–50)
HGB BLD-MCNC: 14.3 G/DL — SIGNIFICANT CHANGE UP (ref 13–17)
IANC: 4.25 K/UL — SIGNIFICANT CHANGE UP (ref 1.8–7.4)
IMM GRANULOCYTES NFR BLD AUTO: 0.1 % — SIGNIFICANT CHANGE UP (ref 0–0.9)
LITHIUM SERPL-MCNC: 0.2 MMOL/L — LOW (ref 0.6–1.2)
LYMPHOCYTES # BLD AUTO: 2.1 K/UL — SIGNIFICANT CHANGE UP (ref 1–3.3)
LYMPHOCYTES # BLD AUTO: 29.7 % — SIGNIFICANT CHANGE UP (ref 13–44)
MCHC RBC-ENTMCNC: 30.9 PG — SIGNIFICANT CHANGE UP (ref 27–34)
MCHC RBC-ENTMCNC: 34.5 GM/DL — SIGNIFICANT CHANGE UP (ref 32–36)
MCV RBC AUTO: 89.6 FL — SIGNIFICANT CHANGE UP (ref 80–100)
MONOCYTES # BLD AUTO: 0.36 K/UL — SIGNIFICANT CHANGE UP (ref 0–0.9)
MONOCYTES NFR BLD AUTO: 5.1 % — SIGNIFICANT CHANGE UP (ref 2–14)
NEUTROPHILS # BLD AUTO: 4.25 K/UL — SIGNIFICANT CHANGE UP (ref 1.8–7.4)
NEUTROPHILS NFR BLD AUTO: 60 % — SIGNIFICANT CHANGE UP (ref 43–77)
NRBC # BLD: 0 /100 WBCS — SIGNIFICANT CHANGE UP (ref 0–0)
NRBC # FLD: 0 K/UL — SIGNIFICANT CHANGE UP (ref 0–0)
PLATELET # BLD AUTO: 198 K/UL — SIGNIFICANT CHANGE UP (ref 150–400)
POTASSIUM SERPL-MCNC: 4.4 MMOL/L — SIGNIFICANT CHANGE UP (ref 3.5–5.3)
POTASSIUM SERPL-SCNC: 4.4 MMOL/L — SIGNIFICANT CHANGE UP (ref 3.5–5.3)
PROT SERPL-MCNC: 6.3 G/DL — SIGNIFICANT CHANGE UP (ref 6–8.3)
RBC # BLD: 4.63 M/UL — SIGNIFICANT CHANGE UP (ref 4.2–5.8)
RBC # FLD: 13.2 % — SIGNIFICANT CHANGE UP (ref 10.3–14.5)
SARS-COV-2 RNA SPEC QL NAA+PROBE: SIGNIFICANT CHANGE UP
SODIUM SERPL-SCNC: 141 MMOL/L — SIGNIFICANT CHANGE UP (ref 135–145)
WBC # BLD: 7.08 K/UL — SIGNIFICANT CHANGE UP (ref 3.8–10.5)
WBC # FLD AUTO: 7.08 K/UL — SIGNIFICANT CHANGE UP (ref 3.8–10.5)

## 2024-04-16 PROCEDURE — 99233 SBSQ HOSP IP/OBS HIGH 50: CPT

## 2024-04-16 PROCEDURE — 99239 HOSP IP/OBS DSCHRG MGMT >30: CPT

## 2024-04-16 PROCEDURE — 99232 SBSQ HOSP IP/OBS MODERATE 35: CPT

## 2024-04-16 RX ORDER — ESCITALOPRAM OXALATE 10 MG/1
1 TABLET, FILM COATED ORAL
Refills: 0 | DISCHARGE

## 2024-04-16 RX ORDER — IBUPROFEN 200 MG
4 TABLET ORAL
Refills: 0 | DISCHARGE

## 2024-04-16 RX ORDER — LITHIUM CARBONATE 300 MG/1
1 TABLET, EXTENDED RELEASE ORAL
Refills: 0 | DISCHARGE

## 2024-04-16 RX ORDER — TRAZODONE HCL 50 MG
1 TABLET ORAL
Refills: 0 | DISCHARGE

## 2024-04-16 RX ORDER — NICOTINE POLACRILEX 2 MG
4 GUM BUCCAL
Refills: 0 | Status: DISCONTINUED | OUTPATIENT
Start: 2024-04-16 | End: 2024-04-25

## 2024-04-16 RX ORDER — HALOPERIDOL DECANOATE 100 MG/ML
5 INJECTION INTRAMUSCULAR ONCE
Refills: 0 | Status: DISCONTINUED | OUTPATIENT
Start: 2024-04-16 | End: 2024-04-23

## 2024-04-16 RX ORDER — BENZTROPINE MESYLATE 1 MG
1 TABLET ORAL
Refills: 0 | DISCHARGE

## 2024-04-16 RX ORDER — INFLUENZA VIRUS VACCINE 15; 15; 15; 15 UG/.5ML; UG/.5ML; UG/.5ML; UG/.5ML
0.5 SUSPENSION INTRAMUSCULAR ONCE
Refills: 0 | Status: DISCONTINUED | OUTPATIENT
Start: 2024-04-16 | End: 2024-04-16

## 2024-04-16 RX ORDER — HALOPERIDOL DECANOATE 100 MG/ML
5 INJECTION INTRAMUSCULAR EVERY 6 HOURS
Refills: 0 | Status: DISCONTINUED | OUTPATIENT
Start: 2024-04-16 | End: 2024-04-23

## 2024-04-16 RX ORDER — OMEPRAZOLE 10 MG/1
1 CAPSULE, DELAYED RELEASE ORAL
Refills: 0 | DISCHARGE

## 2024-04-16 RX ORDER — TRAZODONE HCL 50 MG
100 TABLET ORAL ONCE
Refills: 0 | Status: COMPLETED | OUTPATIENT
Start: 2024-04-16 | End: 2024-04-16

## 2024-04-16 RX ORDER — PALIPERIDONE 1.5 MG/1
1 TABLET, EXTENDED RELEASE ORAL
Refills: 0 | DISCHARGE

## 2024-04-16 RX ADMIN — Medication 2 MILLIGRAM(S): at 20:18

## 2024-04-16 RX ADMIN — HALOPERIDOL DECANOATE 5 MILLIGRAM(S): 100 INJECTION INTRAMUSCULAR at 20:17

## 2024-04-16 RX ADMIN — Medication 100 MILLIGRAM(S): at 21:35

## 2024-04-16 RX ADMIN — Medication 1 MILLIGRAM(S): at 11:35

## 2024-04-16 NOTE — DISCHARGE NOTE NURSING/CASE MANAGEMENT/SOCIAL WORK - NSDPFAC_GEN_ALL_CORE
[place] EDGAR Waddell   [facility]

## 2024-04-16 NOTE — PHARMACOTHERAPY INTERVENTION NOTE - COMMENTS
Medication list previously updated in Outpatient Medication Record (OMR) was deleted.     Restored list:  Benztropine 1mg tablet orally 2 times a day  Escitalopram 20mg tablet orally once a day at 9AM  Lithium 300mg capsule orally 2 times a day  Lithium 150mg capsule orally 2 times a day  Omeprazole 40mg capsule orally once a day at 9AM  Paliperidone Extended Release 9mg tablet orally once a day at 9AM  Trazodone 100mg tablet orally once a day at bedtime    Please call spectra [phone] if you have any questions.  Medication list previously updated in Outpatient Medication Record (OMR) was deleted.     Restored list:  Benztropine 1mg tablet orally 2 times a day  Escitalopram 20mg tablet - 1 tab orally once a day at 9AM  Lithium 300mg capsule - 1 cap orally 2 times a day  Lithium 150mg capsule - 1 cap orally 2 times a day  Omeprazole 40mg capsule orally once a day at 9AM  Paliperidone Extended Release 9mg tablet - 1 tab orally once a day at 9AM  Trazodone 100mg tablet - 1 tab orally once a day at bedtime    Please call spectra [phone] if you have any questions.  Medication list previously updated in Outpatient Medication Record (OMR) was deleted.     Restored list:  Benztropine 1mg tablet - 1 tab orally 2 times a day  Escitalopram 20mg tablet - 1 tab orally once a day at 9AM  Lithium 300mg capsule - 1 cap orally 2 times a day  Lithium 150mg capsule - 1 cap orally 2 times a day  Omeprazole 40mg capsule - 1 cap orally once a day at 9AM  Paliperidone Extended Release 9mg tablet - 1 tab orally once a day at 9AM  Trazodone 100mg tablet - 1 tab orally once a day at bedtime    Please call spectra [phone] if you have any questions.

## 2024-04-16 NOTE — PATIENT PROFILE ADULT - FUNCTIONAL ASSESSMENT - BASIC MOBILITY 4.
First Trimester of Pregnancy    The first trimester of pregnancy is from week 1 until the end of week 12 (months 1 through 3). During this time, your baby will begin to develop inside you. At 6-8 weeks, the eyes and face are formed, and the heartbeat can be seen on ultrasound. At the end of 12 weeks, all the baby's organs are formed. Prenatal care is all the medical care you receive before the birth of your baby. Make sure you get good prenatal care and follow all of your doctor's instructions.  HOME CARE   Medicines  · Take medicine only as told by your doctor. Some medicines are safe and some are not during pregnancy.  · Take your prenatal vitamins as told by your doctor.  · Take medicine that helps you poop (stool softener) as needed if your doctor says it is okay.  Diet  · Eat regular, healthy meals.  · Your doctor will tell you the amount of weight gain that is right for you.  · Avoid raw meat and uncooked cheese.  · If you feel sick to your stomach (nauseous) or throw up (vomit):  ¨ Eat 4 or 5 small meals a day instead of 3 large meals.  ¨ Try eating a few soda crackers.  ¨ Drink liquids between meals instead of during meals.  · If you have a hard time pooping (constipation):  ¨ Eat high-fiber foods like fresh vegetables, fruit, and whole grains.  ¨ Drink enough fluids to keep your pee (urine) clear or pale yellow.  Activity and Exercise  · Exercise only as told by your doctor. Stop exercising if you have cramps or pain in your lower belly (abdomen) or low back.  · Try to avoid standing for long periods of time. Move your legs often if you must  one place for a long time.  · Avoid heavy lifting.  · Wear low-heeled shoes. Sit and stand up straight.  · You can have sex unless your doctor tells you not to.  Relief of Pain or Discomfort  · Wear a good support bra if your breasts are sore.  · Take warm water baths (sitz baths) to soothe pain or discomfort caused by hemorrhoids. Use hemorrhoid cream if your  doctor says it is okay.  · Rest with your legs raised if you have leg cramps or low back pain.  · Wear support hose if you have puffy, bulging veins (varicose veins) in your legs. Raise (elevate) your feet for 15 minutes, 3-4 times a day. Limit salt in your diet.  Prenatal Care  · Schedule your prenatal visits by the twelfth week of pregnancy.  · Write down your questions. Take them to your prenatal visits.  · Keep all your prenatal visits as told by your doctor.  Safety  · Wear your seat belt at all times when driving.  · Make a list of emergency phone numbers. The list should include numbers for family, friends, the hospital, and police and fire departments.  General Tips  · Ask your doctor for a referral to a local prenatal class. Begin classes no later than at the start of month 6 of your pregnancy.  · Ask for help if you need counseling or help with nutrition. Your doctor can give you advice or tell you where to go for help.  · Do not use hot tubs, steam rooms, or saunas.  · Do not douche or use tampons or scented sanitary pads.  · Do not cross your legs for long periods of time.  · Avoid litter boxes and soil used by cats.  · Avoid all smoking, herbs, and alcohol. Avoid drugs not approved by your doctor.  · Do not use any tobacco products, including cigarettes, chewing tobacco, and electronic cigarettes. If you need help quitting, ask your doctor. You may get counseling or other support to help you quit.  · Visit your dentist. At home, brush your teeth with a soft toothbrush. Be gentle when you floss.  GET HELP IF:  · You are dizzy.  · You have mild cramps or pressure in your lower belly.  · You have a nagging pain in your belly area.  · You continue to feel sick to your stomach, throw up, or have watery poop (diarrhea).  · You have a bad smelling fluid coming from your vagina.  · You have pain with peeing (urination).  · You have increased puffiness (swelling) in your face, hands, legs, or ankles.  GET HELP  RIGHT AWAY IF:   · You have a fever.  · You are leaking fluid from your vagina.  · You have spotting or bleeding from your vagina.  · You have very bad belly cramping or pain.  · You gain or lose weight rapidly.  · You throw up blood. It may look like coffee grounds.  · You are around people who have [place] measles, fifth disease, or chickenpox.  · You have a very bad headache.  · You have shortness of breath.  · You have any kind of trauma, such as from a fall or a car accident.     This information is not intended to replace advice given to you by your health care provider. Make sure you discuss any questions you have with your health care provider.   4 = No assist / stand by assistance

## 2024-04-16 NOTE — BH INPATIENT PSYCHIATRY ASSESSMENT NOTE - NSTREATMENTCERTY_PSY_ALL_CORE
Resident That inpatient services furnished since the previous certification or recertification were, and continue to be required: for treatment that could reasonably be expected to improve the patient's condition; or, for diagnostic study;    That the hospital records show that the services furnished were: intensive treatment services; admission and related services necessary for diagnostic study or equivalent services;    That the patient continues to need, on a daily basis active inpatient treatment furnished directly by or requiring the supervision of inpatient psychiatric facility personnel.

## 2024-04-16 NOTE — BH CONSULTATION LIAISON PROGRESS NOTE - NSBHTIMEACTIVITIESPERFORMED_PSY_A_CORE
Care coordinated with Dr. Arreola, ROSLYN and NOLA Kraft Care coordinated with Dr. Arreola, ACP , NOLA [place], NILES Chowdary

## 2024-04-16 NOTE — BH INPATIENT PSYCHIATRY ASSESSMENT NOTE - NSBHCHARTREVIEWVS_PSY_A_CORE FT
Vital Signs Last 24 Hrs  T(C): 36.4 (04-16-24 @ 13:47), Max: 36.8 (04-15-24 @ 22:09)  T(F): 97.6 (04-16-24 @ 13:47), Max: 98.3 (04-15-24 @ 22:09)  HR: 55 (04-16-24 @ 13:47) (39 - 55)  BP: 124/71 (04-16-24 @ 13:47) (102/64 - 124/71)  BP(mean): --  RR: 18 (04-16-24 @ 13:47) (12 - 18)  SpO2: 100% (04-16-24 @ 13:47) (98% - 100%)

## 2024-04-16 NOTE — BH INPATIENT PSYCHIATRY ASSESSMENT NOTE - MSE UNSTRUCTURED FT
Patient is awake and alert. Affect anxious. Cooperative. Speech fluent. TP logical and coherent. No AH/CAH. No delusions expressed. Some motor agitation. +suicidal ideations at time of exam, thoughts of wanting to die. Limited insight.

## 2024-04-16 NOTE — BH CONSULTATION LIAISON PROGRESS NOTE - NSBHCHARTREVIEWVS_PSY_A_CORE FT
Vital Signs Last 24 Hrs  T(C): 36.7 (16 Apr 2024 11:22), Max: 36.8 (15 Apr 2024 22:09)  T(F): 98 (16 Apr 2024 11:22), Max: 98.3 (15 Apr 2024 22:09)  HR: 44 (16 Apr 2024 11:22) (44 - 52)  BP: 118/44 (16 Apr 2024 11:22) (102/64 - 121/69)  BP(mean): --  RR: 12 (16 Apr 2024 11:22) (12 - 18)  SpO2: 100% (16 Apr 2024 11:22) (98% - 100%)    Parameters below as of 16 Apr 2024 11:22  Patient On (Oxygen Delivery Method): room air

## 2024-04-16 NOTE — BH INPATIENT PSYCHIATRY ASSESSMENT NOTE - NSBHATTESTBILLING_PSY_A_CORE
08074-[id number] diagnostic evaluation with medical services 99221-Initial OBS or IP - low complexity OR 40-54 mins

## 2024-04-16 NOTE — DISCHARGE NOTE PROVIDER - NSDCMRMEDTOKEN_GEN_ALL_CORE_FT
benztropine 1 mg oral tablet: 1 tab(s) orally 2 times a day  escitalopram 20 mg oral tablet: 1 tab(s) orally once a day at 9 am  ibuprofen 200 mg oral tablet: 4 tab(s) orally every 6 hours as needed for shoulder pain  lithium 150 mg oral capsule: 1 cap(s) orally 2 times a day  lithium 300 mg oral capsule: 1 cap(s) orally 2 times a day  omeprazole 40 mg oral delayed release capsule: 1 cap(s) orally once a day at 9 am  paliperidone 9 mg oral tablet, extended release: 1 tab(s) orally once a day at 9 am  traZODone 100 mg oral tablet: 1 tab(s) orally once a day (at bedtime)

## 2024-04-16 NOTE — DISCHARGE NOTE NURSING/CASE MANAGEMENT/SOCIAL WORK - PATIENT PORTAL LINK FT
You can access the FollowMyHealth Patient Portal offered by [facility] by registering at the following website: [email or web address]. By joining [employer]’s FollowMyHealth portal, you will also be able to view your health information using other applications (apps) compatible with our system.

## 2024-04-16 NOTE — BH CONSULTATION LIAISON PROGRESS NOTE - CURRENT MEDICATION
MEDICATIONS  (STANDING):  influenza   Vaccine 0.5 milliLiter(s) IntraMuscular once    MEDICATIONS  (PRN):

## 2024-04-16 NOTE — BH PATIENT PROFILE - NSELOPEMENTRISK_PSY_ALL_CORE
Bed in lowest position, wheels locked, appropriate side rails in place/Call bell, personal items and telephone in reach/Instruct patient to call for assistance before getting out of bed or chair/Non-slip footwear when patient is out of bed/Loyal to call system/Physically safe environment - no spills, clutter or unnecessary equipment/Purposeful Proactive Rounding/Room/bathroom lighting operational, light cord in reach
No

## 2024-04-16 NOTE — BH INPATIENT PSYCHIATRY ASSESSMENT NOTE - CURRENT MEDICATION
MEDICATIONS  (STANDING):    MEDICATIONS  (PRN):   MEDICATIONS  (STANDING):    MEDICATIONS  (PRN):  haloperidol     Tablet 5 milliGRAM(s) Oral every 6 hours PRN Agitation  haloperidol    Injectable 5 milliGRAM(s) IntraMuscular once PRN Severe Agitation  LORazepam     Tablet 2 milliGRAM(s) Oral every 4 hours PRN Anxiety/Agitation  LORazepam   Injectable 2 milliGRAM(s) IntraMuscular once PRN Severe Anxiety/ Severe Agitation  nicotine  Polacrilex Gum 4 milliGRAM(s) Oral every 2 hours PRN Smoking Cessation

## 2024-04-16 NOTE — DISCHARGE NOTE PROVIDER - NSDCHC_MEDRECSTATUS_GEN_ALL_CORE
Spoke with mom and she states she is spitting up just as much with Enfamil AR. Pt's are taking Zantac 0.3ml tid   Admission Reconciliation is Not Complete  Discharge Reconciliation is Not Complete Admission Reconciliation is Completed  Discharge Reconciliation is Not Complete Admission Reconciliation is Completed  Discharge Reconciliation is Completed

## 2024-04-16 NOTE — BH CONSULTATION LIAISON PROGRESS NOTE - NSBHASSESSMENTFT_PSY_ALL_CORE
Patient is a 48 man with with hx of Bipolar Disorder with psychotic features and prior suicide attempts who presents after suicide attempt, as per chart patient overdosed on lexapro and lithium. Consulted for SA.    Patient presenting with worsening depression/hopelessness, s/p SA via overdosing on his psychiatric medications,  poor historian in terms of his medications, ongoing SI and CAH of demon telling him to kill himself, denies HI. Follow up with FACT team but does not have their number.     4/16: Patient remains depressed/hopeless with intermittent CAH telling him to kill himslef, +SI with plan. Patient is a poor historian and does not know his medications.     PLAN:  1- CO 1:1  for Suicidality and CAH, no loose or sharp objects, low threshold to call security/restraints/PRN to keep patient/everyone else safe    2-HOLD psychiatric medications given current OD/ongoing bradycardia, appreciate med. rec from primary team   3-Monitor qtc, Lithium level (most recent 0.2), TSH, bun/cr and monitor for serotonin syndrome.    4-Needs collateral from FACT team Maria Del Rosario [phone] (contact info. provided by patient)  5-Ativan 2mg PO/IM/IV q4 h prn for anxiety/agitation  6-Dispo: Given serious SA and worsting psychosis, pt. needs an inpatient psychiatric admission for safety/further stabilization,  once medically stabilized    Care coordinated with Dr. Arreola, ACP and NOLA Kraft Patient is a 48 man with with hx of Bipolar Disorder with psychotic features and prior suicide attempts who presents after suicide attempt, as per chart patient overdosed on lexapro and lithium. Consulted for SA.    Patient presenting with worsening depression/hopelessness, s/p SA via overdosing on his psychiatric medications,  poor historian in terms of his medications, ongoing SI and CAH of demon telling him to kill himself, denies HI. Follow up with FACT team but does not have their number.     4/16: Patient remains depressed/hopeless with intermittent CAH telling him to kill himslef, +SI with plan. Patient is a poor historian and does not know his medications.     PLAN:  1- CO 1:1  for Suicidality and CAH, no loose or sharp objects, low threshold to call security/restraints/PRN to keep patient/everyone else safe    2-HOLD psychiatric medications given current OD/ongoing bradycardia, appreciate med. rec from primary team   3-Monitor qtc, Lithium level (most recent 0.2), TSH, bun/cr and monitor for serotonin syndrome.    4-Needs collateral from FACT team Maria Del Rosario [phone] (contact info. provided by patient)  5-Ativan 2mg PO/IM/IV q4 h prn for anxiety/agitation  6-Dispo: Given serious SA and worsting psychosis, pt. needs an inpatient psychiatric admission for safety/further stabilization,  once medically stabilized    Care coordinated with Dr. Arreola, ACP and NOLA Gillette Patient is a 48 man with with hx of Bipolar Disorder with psychotic features and prior suicide attempts who presents after suicide attempt, as per chart patient overdosed on lexapro and lithium. Consulted for SA.    Patient presenting with worsening depression/hopelessness, s/p SA via overdosing on his psychiatric medications,  poor historian in terms of his medications, ongoing SI and CAH of demon telling him to kill himself, denies HI. Follow up with FACT team but does not have their number.     4/16: Patient remains depressed/hopeless with intermittent CAH telling him to kill himslef, +SI with plan. Patient is a poor historian and does not know his medications.     PLAN:  1- CO 1:1  for Suicidality and CAH, no loose or sharp objects, low threshold to call security/restraints/PRN to keep patient/everyone else safe    2-HOLD psychiatric medications given current OD/ongoing bradycardia, appreciate med. rec from primary team   3-Monitor qtc, Lithium level (most recent 0.2), TSH, bun/cr and monitor for serotonin syndrome.    4-Needs collateral from FACT team Maria Del Rosario [phone] (contact info. provided by patient today, in process of obtaining collateral)  5-Ativan 2mg PO/IM/IV q4 h prn for anxiety/agitation  6-Dispo: Given serious SA and worsting psychosis, pt. needs an inpatient psychiatric admission for safety/further stabilization,  once medically stabilized    Care coordinated with Dr. Arreola, ACP and NOLA Gillette

## 2024-04-16 NOTE — BH INPATIENT PSYCHIATRY ASSESSMENT NOTE - NSBHASSESSSUMMFT_PSY_ALL_CORE
48 year-old with bipolar disorder, hx of cocaine and ETOH abuse in remission, multiple admissions, reporting recent psychotic symptoms and then ingestion of lithium and SSRI (lithium never in toxic range), medically cleared and transferred on 2 PC. Currently reporting suicidal ideations.   Will 1:1 obs for recent psychosis, suicidal ideation (no current evidence of psychosis)  PRNs for now, psych meds held, defer to primary team

## 2024-04-16 NOTE — BH INPATIENT PSYCHIATRY ASSESSMENT NOTE - HPI (INCLUDE ILLNESS QUALITY, SEVERITY, DURATION, TIMING, CONTEXT, MODIFYING FACTORS, ASSOCIATED SIGNS AND SYMPTOMS)
Patient is a 48 man with with hx of Bipolar Disorder with psychotic features and prior suicide attempts who presents after suicide attempt, patient overdosed on lexapro and lithium on 4/14. Patient followed by medicine, [facility] psychiatry,     Chart reviewed, pt. seen/evaluated, calm/cooperative, reports he has h/o bipolar disorder, poor historian in terms of his medications, reports " I took the whole bottle of one of my medication to kill myself" does not know which medication. States he has been hearing voices of demons telling him to kill himself since he got discharged from [facility] last week (admitted for SA). Reports ongoing SI but no intention or plan, Denies HIIP, denies voices are telling him to harm others. Denies feeling unsafe. Reports depressed mood and hopelessness. Reports he follow up with FACT team but does not have their number. Reports he was  from his wife 18 months ago, has been sober form Alcohol and cocaine sine last 18 months. Reports he has a girl friend Magi and sister Melonie (gave consent to speak with sister, GF and FACT team). Reports multiple SA in the past via overdosing.  47 y/o with with hx of bipolar disorder  and prior suicide attempts, recently discharged from inpatient unit at [facility] [place],  who presents after suicide attempt, patient overdosed on lexapro and lithium on 4/14. Patient followed by medicine, [facility] psychiatry, lithium level never in toxic range, was medically cleared and 2PC'd, transferred to [facility]. On arrival to the unit, patient reported to nursing that he feels hopeless, and "I want to kill myself," reporting that he would cut himself if he could. He reports feeling very depressed and anxious.   Upon admission to [facility] patient  reported he has h/o bipolar disorder, poor historian in terms of his medications, reports " I took the whole bottle of one of my medication to kill myself" does not know which medication. Stated that he has been hearing voices of demons telling him to kill himself since he got discharged from [facility] [place] last week (admitted for SA). Reports ongoing SI but no intention or plan, Denies HIIP, denies voices are telling him to harm others. Denies feeling unsafe. Reports depressed mood and hopelessness. Reports he follow up with FACT team but does not have their number. Reports he was  from his wife 18 months ago, has been sober form alcohol and cocaine since last 18 months.

## 2024-04-16 NOTE — PROVIDER CONTACT NOTE (OTHER) - BACKGROUND
What to expect after a Nerve Block    Nerve blocks administered to block pain affect many types of nerves, including those nerves that control movement, pain, and normal sensation. Following a nerve block, you may notice some bruising at the site where the block was given. You may experience sensations such as: numbness of the affected area or limb, tingling, heaviness (that is the limb feels heavy to you), weakness or inability to move the affected arm or leg, or a feeling as if your arm or leg has “fallen asleep.”     A nerve block can last from 2 to 36 hours depending on the medications used.  Usually the weakness wears off first followed by the tingling and heaviness. As the block wears off, you may begin to notice pain; however, this sequence of events may occur in any order. Typically, you will be able to move your limb before you will feel it. Once a nerve block begins to wear off, the effects are usually completely gone within 60 minutes.  If you experience continued side effects that you believe are block related for longer than 48 hours, please call your healthcare provider. Please see block-specific instructions below.    Instructions for any block involving the shoulder or arm  • If you have had any kind of shoulder/arm block, you will go home with your arm in a sling. Wear the sling until the block has completely worn off. You may be required to wear it for a longer period of time per your surgeon’s recommendations.  • If you have had a shoulder/arm block, it is a good idea to sleep on a recliner with pillows under your arm.    You may experience symptoms such as:  Shortness of breath  Hoarseness   Blurry vision  Unequal pupils  Drooping of your face on the same side as the block was performed    These are side effects associated with this kind of block and should go away within 12 hours.    Note: If you have severe or prolonged shortness of breath, please seek medical assistance as soon as 
Patient admitted for poisoning by SSRIs
possible.     Protection of a “blocked” arm or leg (limb)  • After a nerve block, you cannot feel pain, pressure, or extremes of temperature in the affected limb. And because of this, your blocked limb is at more risk for injury. For example, it is possible to burn your limb on an extremely hot surface without feeling it.     • When resting, it is important to reposition your limb periodically to avoid prolonged pressure on it. This may require the use of pillows and padding.    • While sleeping, you should avoid rolling onto the affected limb or putting too much pressure on it.     • If you have a cast or tight dressing, check the color of your fingers or toes of the affected limb. Call your surgeon if they look discolored (that is, dusky, dark colored).    • Use caution in cold weather. Cover your limb appropriately to protect it from the cold.      Pain Management:    Your surgeon will give you a prescription for pain medication. Begin taking this before the nerve block wears off. Bear in mind that sometimes the block can wear off in the middle of the night. [facility]        Caleb Chatman, Drake, Lani, Dank ,Sridhar, Rupinder  [address], [address]  [phone]    Post-Operative Instructions for AV Fistula / Graft   Diet: Regular Diet    Medications: Take your regularly scheduled medications on the day of your surgery, unless your doctor has directed you otherwise. You may be sent home with a prescription for pain medication, follow the directions as prescribed.    Activity Restrictions / Driving: Avoid lifting more than 15 pounds or other activities that stress or compress the access area. No driving for the remainder of the day after surgery. You may drive when you no longer are taking narcotic pain medications. If a nerve block was done to numb your arm for surgery, you will be placed in an arm sling.  This numbness and inability to move the arm can last for as little as 6 
"hours but as many as 18.  The sling should be used during this time but can be removed when sensation and movement of your arm is normal and does not need to be used after that. Use of the arm is encouraged after the surgery.    Incision Care: Some bruising is normal. If you have drainage from the incision please notify the office. Dressing should be removed in 48 hours. After dressing is removed, it is OK to shower. Do not submerge incision until cleared by your surgeon (bath or swimming).    Bathing and Showering: You may shower after you remove your dressing.    Follow-up Appointments: You will need to return to the office for a follow-up visit within 1-3 weeks after your surgery. Please make sure you have your appointment scheduled, call [phone].    The patient (you) should:  1. Avoid wearing tight constrictive clothing over that arm.  2. Avoid wearing jewelry that is tight, such as a watch on the access arm.  3. Avoid carrying heavy objects.  4. Avoid purse straps over the fistula.  5. Avoid sleeping on the arm or keeping it bent for extended periods of time.  6. Each day, using your opposite hand, feel over the fistula for the \"thrill\" or vibration that is normally present.    Fistula Information / Care:  ·  It is normal to have swelling in the surgical area. To help control this swelling, you should elevate your arm on a pillow.  ·  Wiggle your fingers and clinch your fist 10 times every hour, while awake, for the first 5-7 days. Also, bend and straighten at the elbow to regain normal range of motion. These exercises are designed to promote circulation in the fingers and aid in draining away the excess fluid accumulation in the immediate area.  · No blood pressures or needle sticks in the arm with your access.    Call the office for the followin. Fever greater than 101.0  2. Uncontrolled pain. This is on a scale of 1-10 (10 being the worst pain imaginable) your pain is a level 7 or above.  3. It is "
important that you notify our office if you are having numbness and significant pain in the extremity in which you have just had surgery!  4. Decreased or absent thrill.  5. Nausea, diarrhea, and/or vomiting that continue for 12-24 hours.  6. Signs of an infection: redness, increased swelling, drainage, fever and/or chills.  7. Chest pain or difficulty breathing.    The fistula or graft CAN NOT be used until the MD has given written approval. Generally, a graft will be ready to use in 2 weeks, and a fistula will be ready to use in 6-8 weeks.     If you have further questions after reading this handout, the office is open from 8:30am to 5:00pm Monday through Friday. Call [phone].  
pt admitted post suicide attempt via drug ingestion.

## 2024-04-16 NOTE — DISCHARGE NOTE PROVIDER - NSDCCPCAREPLAN_GEN_ALL_CORE_FT
PRINCIPAL DISCHARGE DIAGNOSIS  Diagnosis: Suicide attempt by drug ingestion  Assessment and Plan of Treatment: You were admitted and evaluated for Suicide attempt. You will need psychiatric inpatient hospitalization.

## 2024-04-16 NOTE — PROVIDER CONTACT NOTE (OTHER) - ACTION/TREATMENT ORDERED:
provider aware, no new orders at this time.
Provider made aware. EKG ordered and performed. Continue to monitor on tele.

## 2024-04-16 NOTE — BH CONSULTATION LIAISON PROGRESS NOTE - NSBHFUPINTERVALHXFT_PSY_A_CORE
Chart reviewed, no prns received, pt. seen/evaluated,  calm/polite, reports feeling depressed/anxious, continues to have intermittent CAH and SI with plan to overdose, denies HI, no paranoia or delusions elicited.    No stiffness on exam, no myoclonus   Chart reviewed, no prns received overnight, pt. seen/evaluated,  calm/polite, reports feeling depressed/anxious, continues to have intermittent CAH and SI with plan to overdose, denies HI, no paranoia or delusions elicited.    No stiffness on exam, no myoclonus

## 2024-04-16 NOTE — PATIENT PROFILE ADULT - PRO INTERPRETER NEED 2
[de-identified] : here for medical f/u  after second Pfizer vaccine  4/13 ,more HA on 4/19with 1 episode epistaxis, felt better after that \par  HA every day and epistasis  recurred
English

## 2024-04-16 NOTE — DISCHARGE NOTE PROVIDER - HOSPITAL COURSE
48M with hx of Bipolar Disorder with psychotic features and prior suicide attempts who presents after suicide attempt with drug ingestion (lexapro and lithium). Patient was admitted for close monitoring. VSS. labs unremarkable. Monitored on telemetry with sinus bradycardia, no pauses. EKG showed sinus bradycardia. , on repeat 4/16 qtc 422. patient is medically cleared. Per psychiatry, pt will need inpatient psychiatric hospitalization.      48M with hx of Bipolar Disorder with psychotic features and prior suicide attempts who presents after suicide attempt with drug ingestion (lexapro and lithium). Patient was admitted for close monitoring. VSS. labs unremarkable. Monitored on telemetry with sinus bradycardia, no pauses. EKG showed sinus bradycardia. patient is asymptomatic. Discussed with EP, no further testing needed if sinus bradycardia with no symptoms. possible 2/2 drug toxicity. , on repeat 4/16 qtc 422. patient is medically cleared. Per psychiatry, pt will need inpatient psychiatric hospitalization.

## 2024-04-16 NOTE — BH INPATIENT PSYCHIATRY ASSESSMENT NOTE - NSBHMETABOLIC_PSY_ALL_CORE_FT
BMI:   HbA1c:   Glucose: POCT Blood Glucose.: 87 mg/dL (04-14-24 @ 00:08)    BP: --Vital Signs Last 24 Hrs  T(C): 36.4 (04-16-24 @ 13:47), Max: 36.8 (04-15-24 @ 22:09)  T(F): 97.6 (04-16-24 @ 13:47), Max: 98.3 (04-15-24 @ 22:09)  HR: 55 (04-16-24 @ 13:47) (39 - 55)  BP: 124/71 (04-16-24 @ 13:47) (102/64 - 124/71)  BP(mean): --  RR: 18 (04-16-24 @ 13:47) (12 - 18)  SpO2: 100% (04-16-24 @ 13:47) (98% - 100%)      Lipid Panel:

## 2024-04-17 PROCEDURE — 99222 1ST HOSP IP/OBS MODERATE 55: CPT

## 2024-04-17 RX ORDER — CLONAZEPAM 1 MG
0.5 TABLET ORAL
Refills: 0 | Status: DISCONTINUED | OUTPATIENT
Start: 2024-04-17 | End: 2024-04-24

## 2024-04-17 RX ORDER — RISPERIDONE 4 MG/1
1 TABLET ORAL AT BEDTIME
Refills: 0 | Status: DISCONTINUED | OUTPATIENT
Start: 2024-04-17 | End: 2024-04-19

## 2024-04-17 RX ADMIN — Medication 2 MILLIGRAM(S): at 21:46

## 2024-04-17 RX ADMIN — Medication 2 MILLIGRAM(S): at 09:57

## 2024-04-17 RX ADMIN — HALOPERIDOL DECANOATE 5 MILLIGRAM(S): 100 INJECTION INTRAMUSCULAR at 17:27

## 2024-04-17 RX ADMIN — HALOPERIDOL DECANOATE 5 MILLIGRAM(S): 100 INJECTION INTRAMUSCULAR at 09:58

## 2024-04-17 RX ADMIN — Medication 2 MILLIGRAM(S): at 17:27

## 2024-04-17 RX ADMIN — Medication 0.5 MILLIGRAM(S): at 21:46

## 2024-04-17 RX ADMIN — RISPERIDONE 1 MILLIGRAM(S): 4 TABLET ORAL at 21:46

## 2024-04-17 NOTE — BH INPATIENT PSYCHIATRY PROGRESS NOTE - NSBHFUPINTERVALHXFT_PSY_A_CORE
f/up Bipolar d/o w/ psychosis, care discussed w/ tx team, [facility]. Patient reported that he OD because he felt "lonely, depressed and anxious", stated that he is  from his wife and has two teenage daughters, and have not been seeing them. Patient currently reports ++SI, with plan/ intent to cut wrist with knife. endorsed CAH to cut his wrist and reported that these are "demons" talking. Patient was disorganized, not taking medications. Patient with ++PMA, difficulty sitting down for interview, was standing, did take ativan with encouragement. Patient reported fair sleep and decreased appetite. Perseverating on needing to be transferred to [facility]. Patient reported that he took the pills with etoh, but was not drinking daily after d/c from [facility]. Patient is alert and oriented x 3.

## 2024-04-17 NOTE — BH SOCIAL WORK INITIAL PSYCHOSOCIAL EVALUATION - NSHIGHRISKBEHFT_PSY_ALL_CORE
hx of SA and SIB Medical Necessity Information: It is in your best interest to select a reason for this procedure from the list below. All of these items fulfill various CMS LCD requirements except the new and changing color options. Medical Necessity Clause: This procedure was medically necessary because the lesion that was treated was: Lab: [id number] Lab Facility: [id number] Detail Level: Detailed Was A Bandage Applied: Yes Size Of Lesion In Cm (Required): 0.6 X Size Of Lesion In Cm (Optional): 0 Depth Of Shave: dermis Biopsy Method: Dermablade Anesthesia Type: 0.5% lidocaine with 1:200,000 epinephrine Anesthesia Volume In Cc: 0.5 Hemostasis: Drysol Wound Care: Petrolatum Render Path Notes In Note?: No Consent was obtained from the patient. The risks and benefits to therapy were discussed in detail. Specifically, the risks of infection, scarring, bleeding, prolonged wound healing, incomplete removal, allergy to anesthesia, nerve injury and recurrence were addressed. Prior to the procedure, the treatment site was clearly identified and confirmed by the patient. All components of Universal Protocol/PAUSE Rule completed. Post-Care Instructions: I reviewed with the patient in detail post-care instructions. Patient is to keep the biopsy site dry overnight, and then apply vaseline daily until healed. Patient may apply hydrogen peroxide soaks to remove any crusting. Notification Instructions: Patient will be notified of pathology results. However, patient instructed to call the office if not contacted within 2 weeks. Billing Type: Third-Party Bill

## 2024-04-17 NOTE — CONSULT NOTE ADULT - SUBJECTIVE AND OBJECTIVE BOX
49 yo gentlemen with history of bipolar disorder with prior suicide attempts now presenting transferred to [facility] from [facility] after being admitted for suicide attempt via drug ingestion (Lexapro and lithium). While at [facility], toxicology was consulted, patient was monitored on tele, noted to be in sinus bradycardia, otherwise asymptomatic. EP was consulted and no further intervention or workup was recommended.     PAST MEDICAL & SURGICAL HISTORY:  Hypertension      Obesity      No significant past surgical history          Review of Systems:   CONSTITUTIONAL: No fever, weight loss, or fatigue  EYES: No eye pain, visual disturbances, or discharge  ENMT:  No difficulty hearing, tinnitus, vertigo; No sinus or throat pain  NECK: No pain or stiffness  RESPIRATORY: No cough, wheezing, chills or hemoptysis; No shortness of breath  CARDIOVASCULAR: No chest pain, palpitations, dizziness, or leg swelling  GASTROINTESTINAL: No abdominal or epigastric pain. No nausea, vomiting, or hematemesis; No diarrhea or constipation. No melena or hematochezia.  GENITOURINARY: No dysuria, frequency, hematuria, or incontinence  NEUROLOGICAL: No headaches, memory loss, loss of strength, numbness, or tremors  SKIN: No itching, burning, rashes, or lesions   LYMPH NODES: No enlarged glands  ENDOCRINE: No heat or cold intolerance; No hair loss  MUSCULOSKELETAL: No joint pain or swelling; No muscle, back, or extremity pain  HEME/LYMPH: No easy bruising, or bleeding gums  ALLERY AND IMMUNOLOGIC: No hives or eczema    Allergies    No Known Drug Allergies  shellfish (Anaphylaxis)    Intolerances        Social History: Reports he was  from his wife 18 months ago, has been sober form alcohol and cocaine since last 18 months    FAMILY HISTORY:  No pertinent family history in first degree relatives        MEDICATIONS  (STANDING):    MEDICATIONS  (PRN):  haloperidol     Tablet 5 milliGRAM(s) Oral every 6 hours PRN Agitation  haloperidol    Injectable 5 milliGRAM(s) IntraMuscular once PRN Severe Agitation  LORazepam     Tablet 2 milliGRAM(s) Oral every 4 hours PRN Anxiety/Agitation  LORazepam   Injectable 2 milliGRAM(s) IntraMuscular once PRN Severe Anxiety/ Severe Agitation  nicotine  Polacrilex Gum 4 milliGRAM(s) Oral every 2 hours PRN Smoking Cessation      Vital Signs Last 24 Hrs  T(C): 36.7 (17 Apr 2024 08:33), Max: 36.7 (16 Apr 2024 11:22)  T(F): 98.1 (17 Apr 2024 08:33), Max: 98.1 (17 Apr 2024 08:33)  HR: 55 (16 Apr 2024 13:47) (39 - 55)  BP: 124/71 (16 Apr 2024 13:47) (118/44 - 124/71)  BP(mean): --  RR: 18 (17 Apr 2024 07:36) (12 - 18)  SpO2: 100% (16 Apr 2024 13:47) (100% - 100%)    Parameters below as of 17 Apr 2024 07:36  Patient On (Oxygen Delivery Method): room air      CAPILLARY BLOOD GLUCOSE            PHYSICAL EXAM:  GENERAL: NAD, well-developed  HEAD:  Atraumatic, Normocephalic  EYES: EOMI, conjunctiva and sclera clear  NECK: Supple, No JVD  CHEST/LUNG: Clear to auscultation bilaterally; No wheeze  HEART: Regular rate and rhythm; No murmurs, rubs, or gallops  ABDOMEN: Soft, Nontender, Nondistended; Bowel sounds present  EXTREMITIES:  2+ Peripheral Pulses, No clubbing, cyanosis, or edema  NEUROLOGY: non-focal  SKIN: No rashes or lesions    LABS:                        14.3   7.08  )-----------( 198      ( 16 Apr 2024 07:00 )             41.5     04-16    141  |  107  |  12  ----------------------------<  87  4.4   |  25  |  1.05    Ca    9.0      16 Apr 2024 07:00    TPro  6.3  /  Alb  3.9  /  TBili  0.6  /  DBili  x   /  AST  13  /  ALT  16  /  AlkPhos  67  04-16          Urinalysis Basic - ( 16 Apr 2024 07:00 )    Color: x / Appearance: x / SG: x / pH: x  Gluc: 87 mg/dL / Ketone: x  / Bili: x / Urobili: x   Blood: x / Protein: x / Nitrite: x   Leuk Esterase: x / RBC: x / WBC x   Sq Epi: x / Non Sq Epi: x / Bacteria: x        EKG(personally reviewed):    RADIOLOGY & ADDITIONAL TESTS:    Imaging Personally Reviewed:    Consultant(s) Notes Reviewed:      Care Discussed with Consultants/Other Providers:   47 yo gentlemen with history of bipolar disorder with prior suicide attempts now presenting transferred to [facility] from [facility] after being admitted for suicide attempt via drug ingestion (Lexapro and lithium). While at [facility], toxicology was consulted, patient was monitored on tele, noted to be in sinus bradycardia, otherwise asymptomatic. EP was consulted and no further intervention or workup was recommended.     PAST MEDICAL & SURGICAL HISTORY:  Hypertension      Obesity      No significant past surgical history          Review of Systems:   Patient desires to rest at this time, difficult to get a full ROS    Allergies    No Known Drug Allergies  shellfish (Anaphylaxis)    Intolerances        Social History: Reports he was  from his wife 18 months ago, has been sober form alcohol and cocaine since last 18 months    FAMILY HISTORY:  No pertinent family history in first degree relatives        MEDICATIONS  (STANDING):    MEDICATIONS  (PRN):  haloperidol     Tablet 5 milliGRAM(s) Oral every 6 hours PRN Agitation  haloperidol    Injectable 5 milliGRAM(s) IntraMuscular once PRN Severe Agitation  LORazepam     Tablet 2 milliGRAM(s) Oral every 4 hours PRN Anxiety/Agitation  LORazepam   Injectable 2 milliGRAM(s) IntraMuscular once PRN Severe Anxiety/ Severe Agitation  nicotine  Polacrilex Gum 4 milliGRAM(s) Oral every 2 hours PRN Smoking Cessation      Vital Signs Last 24 Hrs  T(C): 36.7 (17 Apr 2024 08:33), Max: 36.7 (16 Apr 2024 11:22)  T(F): 98.1 (17 Apr 2024 08:33), Max: 98.1 (17 Apr 2024 08:33)  HR: 55 (16 Apr 2024 13:47) (39 - 55)  BP: 124/71 (16 Apr 2024 13:47) (118/44 - 124/71)  BP(mean): --  RR: 18 (17 Apr 2024 07:36) (12 - 18)  SpO2: 100% (16 Apr 2024 13:47) (100% - 100%)    Parameters below as of 17 Apr 2024 07:36  Patient On (Oxygen Delivery Method): room air      CAPILLARY BLOOD GLUCOSE            PHYSICAL EXAM:  GENERAL: NAD, well-developed  HEAD:  Atraumatic, Normocephalic  EYES: EOMI, conjunctiva and sclera clear  NECK: Supple, No JVD  CHEST/LUNG: Clear to auscultation bilaterally; No wheeze  HEART: Regular rate and rhythm; No murmurs, rubs, or gallops  ABDOMEN: Soft, Nontender, Nondistended; Bowel sounds present  EXTREMITIES:  2+ Peripheral Pulses, No clubbing, cyanosis, or edema  NEUROLOGY: non-focal  SKIN: No rashes or lesions    LABS:                        14.3   7.08  )-----------( 198      ( 16 Apr 2024 07:00 )             41.5     04-16    141  |  107  |  12  ----------------------------<  87  4.4   |  25  |  1.05    Ca    9.0      16 Apr 2024 07:00    TPro  6.3  /  Alb  3.9  /  TBili  0.6  /  DBili  x   /  AST  13  /  ALT  16  /  AlkPhos  67  04-16          Urinalysis Basic - ( 16 Apr 2024 07:00 )    Color: x / Appearance: x / SG: x / pH: x  Gluc: 87 mg/dL / Ketone: x  / Bili: x / Urobili: x   Blood: x / Protein: x / Nitrite: x   Leuk Esterase: x / RBC: x / WBC x   Sq Epi: x / Non Sq Epi: x / Bacteria: x        EKG(personally reviewed):    RADIOLOGY & ADDITIONAL TESTS:    Imaging Personally Reviewed:    Consultant(s) Notes Reviewed:      Care Discussed with Consultants/Other Providers:

## 2024-04-17 NOTE — BH SOCIAL WORK INITIAL PSYCHOSOCIAL EVALUATION - OTHER PAST PSYCHIATRIC HISTORY (INCLUDE DETAILS REGARDING ONSET, COURSE OF ILLNESS, INPATIENT/OUTPATIENT TREATMENT)
Pt is a 48 year old male, unemployed, undomiciled, and  from his wife and children. per clinicals pt was discharged from [facility] last week and was sent to [facility] in [place], followed by [facility] ACT team, and was placed on AOT. per clinicals pt has hx of bipolar disorder. per clinicals pt has hx of multiple admissions to rehabs and detox's. [facility] clinicals pt has hx of multiple psychiatric admissions most recently last week at [facility] and last at [facility] in 2022. per clinicals pt presents post suicide attempt via OD on lexapro and lithium on 4/14/24. per clinicals pt was transferred to [facility] for SI with intent.    [facility] met with pt to discuss admission and to formulate tx plan. pt presents with depression and with SI. pt reports if he had the opportunity to harm himself while on the unit he would. pt is on 1:1 CO for SI. pt reports he has been suicidal for months. pt reports he was discharged from [facility] last week and sent to [facility] which he did not like. pt endorses AH and reports he has heard voices consistently for years and they never go away. pt did not wish to give details. pt denies HI and VH. pt reports hx of SA via OD with pills and another attempt via cutting wrist. pt reports he last took pills on Saturday and he last cut himself a couple of months ago. pt reports he is  from his wife (who has their kids) and has been living at hospitals the last few months. Pt reports he did not  his medications after being discharged from [facility] last week and he has not taken any medications since. pt provided consent for tx team to speak with his sister, his FACT team, and the SW at [facility] to obtain information regarding his discharge plans from there. pt reports he was last employed January 9th and is currently on unemployment. pt reports he has had over 12 inpatient hospitalizations, first one being at age 20 for self harm. pt endorses hx of substance abuse, last used Cocaine and Alcohol 18 months ago. pt denies current legal issues.

## 2024-04-17 NOTE — PSYCHIATRIC REHAB INITIAL EVALUATION - NSBHPRTOOLBOX_PSY_ALL_CORE
There was concern of aspiration. CTA chest done was negative for PE, showed bilateral pleural effusions and severe esophageal dilatation, surgery was consulted. Patient was transferred to [facility] for further management. Patient was started on noninvasive ventilation and then intubated and admitted to the ICU. She improved, was extubated on 3-7 to high flow O2. Pt was transferred out of the ICU on 3-13. On 3-14 pts hgb dropped to 6.3 and she developed coffee-ground then bright red emesis, and was again transferred back to medical ICU. General surgery performed endoscopy on 3/17 which showed an ulcer at the GE junction and mild gastritis with no active bleeding. After the procedure she developed acute respiratory distress and a fever of 39.8. She was emergently intubated and started on Meropenem and Vancomycin. ID is consulted for antibiotic management. Patient was started on     CURRENT EXAMINATION: 3/18/2020     Pt seen and examined in the ICU. She is intubated and sedated  On low dose levophed    Afebrile today  VSS on low dose levophed    HRR  Lungs clear  Abd soft, very quiet bowel sounds. Per RN, no acute issues    Labs, X rays reviewed: 3/18/2020    BUN: 16  Cr: 0.64    WBC: 10.2--> 13.9  Hb: 7.7  Plat: 241    3-17 Viral PCR negative    Cultures:  Urine:  · 3-17: No growth  Blood:  · 3-17: No growth  Sputum :  · Tracheal aspirate 3-17: Pseudohyphae, few budding yeast cells seen. Pending  Wound:  ·     Discussed with patient, RN    I have personally reviewed the past medical history, past surgical history, medications, social history, and family history, and I have updated the database accordingly.   Past Medical History:     Past Medical History:   Diagnosis Date    Anxiety     Arthritis     Back pain     Bronchitis     Caffeine use     8 coffee / day    Carpal tunnel syndrome     Cataracts, bilateral     Constipation     Depression     Diarrhea     GERD (gastroesophageal clonazePAM  0.5 mg Oral BID    [Held by provider] amitriptyline  10 mg Oral TID    [Held by provider] amLODIPine  10 mg Oral Nightly    gabapentin  300 mg Oral TID    sodium chloride flush  10 mL Intravenous 2 times per day    busPIRone  20 mg Oral TID    [Held by provider] metoprolol succinate  25 mg Oral Nightly    nicotine  1 patch Transdermal Daily    sodium chloride (PF)  10 mL Intravenous Daily    escitalopram  20 mg Oral Daily       Social History:     Social History     Socioeconomic History    Marital status:       Spouse name: Not on file    Number of children: 2    Years of education: Not on file    Highest education level: Not on file   Occupational History    Occupation: INterviewer   Social Needs    Financial resource strain: Not on file    Food insecurity     Worry: Not on file     Inability: Not on file   [employer] needs     Medical: Not on file     Non-medical: Not on file   Tobacco Use    Smoking status: Current Every Day Smoker     Packs/day: 1.00     Years: 50.00     Pack years: 50.00     Types: Cigarettes    Smokeless tobacco: Never Used   Substance and Sexual Activity    Alcohol use: No    Drug use: No    Sexual activity: Never   Lifestyle    Physical activity     Days per week: Not on file     Minutes per session: Not on file    Stress: Not on file   Relationships    Social connections     Talks on phone: Not on file     Gets together: Not on file     Attends Jainism service: Not on file     Active member of club or organization: Not on file     Attends meetings of clubs or organizations: Not on file     Relationship status: Not on file    Intimate partner violence     Fear of current or ex partner: Not on file     Emotionally abused: Not on file     Physically abused: Not on file     Forced sexual activity: Not on file   Other Topics Concern    Not on file   Social History Narrative    Not on file       Family History:     Family History   Problem Relation Age of Onset    Cancer Mother         uterine    Heart Attack Mother     Heart Attack Father     Other Maternal Grandfather         foot gangrene    Other Paternal Grandmother         bleeding ulcers    Other Paternal Grandfather         lung cancer        Allergies:   Compazine [prochlorperazine maleate]; Penicillin v potassium; and Penicillins     Review of Systems:     3/18/2020 UTO, pt intubated, sedated. Constitutional: No fevers or chills. No systemic complaints  Head: No headaches  Eyes: No double vision or blurry vision. No conjunctival inflammation. ENT: No sore throat or runny nose. . No hearing loss, tinnitus or vertigo. Cardiovascular: No chest pain or palpitations. No shortness of breath. No HOLLAND  Lung: No shortness of breath or cough. No sputum production  Abdomen: No nausea, vomiting, diarrhea, or abdominal pain. [place] No cramps. Genitourinary: No increased urinary frequency, or dysuria. No hematuria. No suprapubic or CVA pain  Musculoskeletal: No muscle aches or pains. No joint effusions, swelling or deformities  Hematologic: No bleeding or bruising. Neurologic: No headache, weakness, numbness, or tingling. Integument: No rash, no ulcers. Psychiatric: No depression. Endocrine: No polyuria, no polydipsia, no polyphagia.     Physical Examination :     Patient Vitals for the past 8 hrs:   BP Temp Temp src Pulse Resp SpO2 Weight   03/18/20 1030 (!) 113/54 -- -- 65 29 100 % --   03/18/20 1015 (!) 107/51 -- -- 66 30 99 % --   03/18/20 1000 (!) 105/57 -- -- 65 29 99 % --   03/18/20 0945 (!) 103/49 -- -- 66 28 98 % --   03/18/20 0930 (!) 100/56 -- -- 67 28 98 % --   03/18/20 0915 (!) 108/53 -- -- 67 28 98 % --   03/18/20 0900 (!) 109/51 -- -- 68 29 96 % --   03/18/20 0845 (!) 125/58 -- -- 69 (!) 31 100 % --   03/18/20 0830 (!) 105/53 -- -- 71 (!) 31 100 % --   03/18/20 0822 -- -- -- 70 (!) 31 98 % --   03/18/20 0817 -- -- -- 65 30 99 % --   03/18/20 0815 (!) 104/57 -- -- 63 (!) 33 100 % -- 03/18/20 0800 (!) 114/57 97.2 °F (36.2 °C) CORE 63 (!) 32 100 % --   03/18/20 0745 (!) 110/54 -- -- 60 (!) 31 99 % --   03/18/20 0730 (!) 108/55 -- -- 61 (!) 33 99 % --   03/18/20 0715 (!) 110/58 -- -- 61 (!) 34 100 % --   03/18/20 0700 (!) 102/54 -- -- 62 (!) 33 100 % --   03/18/20 0645 (!) 104/52 -- -- 61 (!) 33 99 % --   03/18/20 0630 128/62 -- -- 63 (!) 32 100 % --   03/18/20 0615 (!) 122/59 -- -- 63 (!) 32 99 % --   03/18/20 0600 (!) 114/58 -- -- 64 (!) 34 99 % 199 lb 8.3 oz (90.5 kg)   03/18/20 0545 (!) 118/55 -- -- 62 30 99 % --   03/18/20 0530 (!) 116/58 -- -- 62 (!) 32 100 % --   03/18/20 0519 -- -- -- 63 (!) 32 98 % --   03/18/20 0518 -- -- -- -- (!) 32 98 % --   03/18/20 0515 (!) 116/56 -- -- 63 (!) 33 98 % --   03/18/20 0500 109/63 -- -- 64 (!) 32 98 % --   03/18/20 0445 (!) 110/51 -- -- 66 30 98 % --   03/18/20 0430 (!) 87/37 -- -- 64 (!) 34 95 % --   03/18/20 0415 (!) 112/97 -- -- 70 29 97 % --   03/18/20 0400 117/62 97.7 °F (36.5 °C) CORE 68 27 96 % --   03/18/20 0345 120/65 -- -- 67 30 96 % --   03/18/20 0330 (!) 113/56 -- -- 67 30 97 % --     General Appearance: Sedated, intubated,   Head:  Normocephalic, no trauma  Eyes: Pupils equal, round, reactive to light   Neck:Supple, without lymphadenopathy. Thyroid normal, No bruits. Pulmonary/Chest: Clear to auscultation. Coarse breath sounds/ crackles appreciated diffusely   Cardiovascular: Regular rate and rhythm without murmurs, rubs, or gallops. Abdomen: Soft, non tender. Bowel sounds very quiet. No organomegaly  All four Extremities:Generalized edema, bilateral LE 2+ edema  Neurologic: Intubated and sedated  Skin: Warm and dry with good turgor. No signs of peripheral arterial or venous insufficiency. No ulcerations. No open wounds.  Abd incisions clean    Medical Decision Making -Laboratory:   I have independently reviewed/ordered the following labs:    CBC with Differential:   Recent Labs     03/17/20  1423  03/18/20  0416 03/18/20  1109   WBC walk/Other

## 2024-04-17 NOTE — BH INPATIENT PSYCHIATRY PROGRESS NOTE - NSBHCHARTREVIEWVS_PSY_A_CORE FT
Vital Signs Last 24 Hrs  T(C): 36.7 (04-17-24 @ 08:33), Max: 36.7 (04-17-24 @ 08:33)  T(F): 98.1 (04-17-24 @ 08:33), Max: 98.1 (04-17-24 @ 08:33)  HR: --  BP: --  BP(mean): --  RR: 18 (04-17-24 @ 07:36) (16 - 18)  SpO2: --    Orthostatic VS  04-17-24 @ 08:33  Lying BP: --/-- HR: --  Sitting BP: 110/75 HR: 102  Standing BP: 106/71 HR: 66  Site: --  Mode: --  Orthostatic VS  04-16-24 @ 19:27  Lying BP: 117/68 HR: 79  Sitting BP: 122/-- HR: 79  Standing BP: --/86 HR: --  Site: --  Mode: --   Vital Signs Last 24 Hrs  T(C): 36.5 (04-18-24 @ 08:25), Max: 36.5 (04-17-24 @ 19:08)  T(F): 97.7 (04-18-24 @ 08:25), Max: 97.7 (04-17-24 @ 19:08)  HR: --  BP: --  BP(mean): --  RR: 18 (04-17-24 @ 19:08) (18 - 18)  SpO2: --    Orthostatic VS  04-18-24 @ 08:25  Lying BP: --/-- HR: --  Sitting BP: 103/62 HR: 57  Standing BP: 100/67 HR: 64  Site: --  Mode: --  Orthostatic VS  04-17-24 @ 19:08  Lying BP: --/-- HR: --  Sitting BP: 117/75 HR: 57  Standing BP: 117/74 HR: 65  Site: --  Mode: --  Orthostatic VS  04-17-24 @ 08:33  Lying BP: --/-- HR: --  Sitting BP: 110/75 HR: 102  Standing BP: 106/71 HR: 66  Site: --  Mode: --  Orthostatic VS  04-16-24 @ 19:27  Lying BP: 117/68 HR: 79  Sitting BP: 122/-- HR: 79  Standing BP: --/86 HR: --  Site: --  Mode: --

## 2024-04-17 NOTE — BH INPATIENT PSYCHIATRY PROGRESS NOTE - NSBHMETABOLIC_PSY_ALL_CORE_FT
BMI: BMI (kg/m2): 27.6 (04-16-24 @ 19:27)  HbA1c:   Glucose: POCT Blood Glucose.: 87 mg/dL (04-14-24 @ 00:08)    BP: --Vital Signs Last 24 Hrs  T(C): 36.7 (04-17-24 @ 08:33), Max: 36.7 (04-17-24 @ 08:33)  T(F): 98.1 (04-17-24 @ 08:33), Max: 98.1 (04-17-24 @ 08:33)  HR: --  BP: --  BP(mean): --  RR: 18 (04-17-24 @ 07:36) (16 - 18)  SpO2: --    Orthostatic VS  04-17-24 @ 08:33  Lying BP: --/-- HR: --  Sitting BP: 110/75 HR: 102  Standing BP: 106/71 HR: 66  Site: --  Mode: --  Orthostatic VS  04-16-24 @ 19:27  Lying BP: 117/68 HR: 79  Sitting BP: 122/-- HR: 79  Standing BP: --/86 HR: --  Site: --  Mode: --    Lipid Panel:  BMI: BMI (kg/m2): 27.6 (04-16-24 @ 19:27)  HbA1c:   Glucose: POCT Blood Glucose.: 87 mg/dL (04-14-24 @ 00:08)    BP: --Vital Signs Last 24 Hrs  T(C): 36.5 (04-18-24 @ 08:25), Max: 36.5 (04-17-24 @ 19:08)  T(F): 97.7 (04-18-24 @ 08:25), Max: 97.7 (04-17-24 @ 19:08)  HR: --  BP: --  BP(mean): --  RR: 18 (04-17-24 @ 19:08) (18 - 18)  SpO2: --    Orthostatic VS  04-18-24 @ 08:25  Lying BP: --/-- HR: --  Sitting BP: 103/62 HR: 57  Standing BP: 100/67 HR: 64  Site: --  Mode: --  Orthostatic VS  04-17-24 @ 19:08  Lying BP: --/-- HR: --  Sitting BP: 117/75 HR: 57  Standing BP: 117/74 HR: 65  Site: --  Mode: --  Orthostatic VS  04-17-24 @ 08:33  Lying BP: --/-- HR: --  Sitting BP: 110/75 HR: 102  Standing BP: 106/71 HR: 66  Site: --  Mode: --  Orthostatic VS  04-16-24 @ 19:27  Lying BP: 117/68 HR: 79  Sitting BP: 122/-- HR: 79  Standing BP: --/86 HR: --  Site: --  Mode: --    Lipid Panel:

## 2024-04-17 NOTE — CONSULT NOTE ADULT - ASSESSMENT
49 yo gentlemen with history of bipolar disorder with prior suicide attempts now presenting transferred to [facility] from [facility] after being admitted for suicide attempt via drug ingestion (Lexapro and lithium).    #Suicide attempt by toxic ingestion  At [facility], toxicology was consulted, patient was monitored on tele, noted to be in sinus bradycardia, otherwise asymptomatic. EP was consulted and no further intervention or workup was recommended.  Lithium levels, not toxic  Continue workup, management and treatment as per primary/psychiatry team    #Bipolar disorder  Continue workup, management and treatment as per primary/psychiatry team

## 2024-04-17 NOTE — BH INPATIENT PSYCHIATRY PROGRESS NOTE - ATTENDING COMMENTS
Chart was reviewed and case discussed with the Psych NP. I agree with the assessment and plan as documented in the Psych NP's progress note and was directly involved in medical decision making.   47 y/o with bipolar d/o presents for worsening depressed mood, disorganization, CAH to kill self with SI. c/w 1:1 CO for SI at this time. Started on Risperdal.

## 2024-04-17 NOTE — PSYCHIATRIC REHAB INITIAL EVALUATION - NSBHALCSUBCOMMRES_PSY_ALL_CORE
----- Message from Gregoria Machado sent at 3/8/2018 11:26 AM CST -----  Contact: Speech Therapist Amara FINLEY (Susana)  Calling in regards to pt discharge from speech today and have met all her goals and please advise  [phone]  
Ok.  Let me know if there is anything that I need to do.  
Unable to assess at this time

## 2024-04-17 NOTE — BH INPATIENT PSYCHIATRY PROGRESS NOTE - NSBHASSESSSUMMFT_PSY_ALL_CORE
48 year-old with bipolar disorder, hx of cocaine and ETOH abuse in remission, multiple admissions, reporting recent psychotic symptoms and then ingestion of lithium and SSRI (lithium never in toxic range), medically cleared and transferred on 2 PC. Currently reporting suicidal ideations.   Will 1:1 obs for recent psychosis, suicidal ideation (no current evidence of psychosis)  PRNs for now    1. inpatient psychiatry, c/w constant observation for SI w/ plan and intent, safety tray  2. start Risperdal 1mg hs for mood/ psychosis  3. obtain collaterals.   4. milieu, groups.   5. lipids/ hga1C in AM

## 2024-04-17 NOTE — PSYCHIATRIC REHAB INITIAL EVALUATION - NSBHPRRECOMMEND_PSY_ALL_CORE
Pt is a 47 y/o male. Pt has hx of inpatient psychiatric admission and most recently discharged from [facility]. Pt reported hx of SIV via cutting. Pt stated he is currently in treatment with [facility]. Pt was admitted to [place] [address] due to SI. Pt stated he overdose on pills prior to his admission. Chart indicated, pt was OD on Lexapro and lithium on 4/14/2024. Pt endorsed depressed, anxious, low motivation, decreased energy and hopelessness.  Pt endorsed AH and stated demon tell him to kill himself. Pt currently denies substance use. Chart indicated, pt has been sober from alcohol and cocaine since last 18 months. Pt is currently unemployed and homeless.   Writer met with pt, introduced self and clinical team. Writer oriented pt with psychiatric rehabilitation department’s function, unit programming and daily activities. Writer provided pt with a copy of unit schedule and psychiatric rehabilitation welcome letter.   During this assessment, pt is anxious, restless. Pt is currently on CO 1:1 for SI/SIB.

## 2024-04-17 NOTE — BH TREATMENT PLAN - NSTXSUICIDINTERPR_PSY_ALL_CORE
Pt would benefit from identifying 2-3 coping skills to cope with SI/SIB by day seven. Writer will encourage pt to participate in daily symptom management group and engage in individual therapy session to achieve his goal.

## 2024-04-17 NOTE — BH SOCIAL WORK INITIAL PSYCHOSOCIAL EVALUATION - NSBHCHILDRESP_PSY_ALL_CORE
with 3 children (ages 15, 14, 3) 4y/o lives at home with current wife, older two live with his ex wife)/No

## 2024-04-18 LAB
A1C WITH ESTIMATED AVERAGE GLUCOSE RESULT: 5.3 % — SIGNIFICANT CHANGE UP (ref 4–5.6)
CHOLEST SERPL-MCNC: 145 MG/DL — SIGNIFICANT CHANGE UP
ESTIMATED AVERAGE GLUCOSE: 105 — SIGNIFICANT CHANGE UP
HDLC SERPL-MCNC: 30 MG/DL — LOW
LIPID PNL WITH DIRECT LDL SERPL: 100 MG/DL — HIGH
NON HDL CHOLESTEROL: 115 MG/DL — SIGNIFICANT CHANGE UP
TRIGL SERPL-MCNC: 76 MG/DL — SIGNIFICANT CHANGE UP

## 2024-04-18 PROCEDURE — 99232 SBSQ HOSP IP/OBS MODERATE 35: CPT

## 2024-04-18 RX ORDER — TRAZODONE HCL 50 MG
100 TABLET ORAL AT BEDTIME
Refills: 0 | Status: DISCONTINUED | OUTPATIENT
Start: 2024-04-18 | End: 2024-04-25

## 2024-04-18 RX ADMIN — HALOPERIDOL DECANOATE 5 MILLIGRAM(S): 100 INJECTION INTRAMUSCULAR at 10:23

## 2024-04-18 RX ADMIN — Medication 2 MILLIGRAM(S): at 20:03

## 2024-04-18 RX ADMIN — RISPERIDONE 1 MILLIGRAM(S): 4 TABLET ORAL at 20:03

## 2024-04-18 RX ADMIN — Medication 2 MILLIGRAM(S): at 13:07

## 2024-04-18 RX ADMIN — Medication 0.5 MILLIGRAM(S): at 20:03

## 2024-04-18 RX ADMIN — Medication 0.5 MILLIGRAM(S): at 10:23

## 2024-04-18 NOTE — DIETITIAN INITIAL EVALUATION ADULT - NS FNS DIET ORDER
Diet, Regular:   DASH/TLC {Sodium & Cholesterol Restricted} (DASH)  Tyramine Restricted (RSTTYR)     Special Instructions for Nursing:  Tyramine Restricted (04-16-24 @ 17:17) [Active]

## 2024-04-18 NOTE — DIETITIAN INITIAL EVALUATION ADULT - ADD RECOMMEND
Honor food preferences.  Monitor/encourage po intake.  RDN to remain available prn.  Cris Forman RDN Pager [phone]

## 2024-04-18 NOTE — DIETITIAN INITIAL EVALUATION ADULT - HEIGHT FOR BMI (CENTIMETERS)
This Rx Request does not qualify for assessment with the OR.  Please review protocol details and the Care Due Message for extra clinical information    Reasons Rx Request may be deferred:  1. Labs/Vitals overdue  2. Labs/Vitals abnormal  3. Patient has been seen in the ED/Hospital since the last PCP visit  4. Medication is non-delegated  5. Provider is a non-participating provider     [phone]

## 2024-04-18 NOTE — BH INPATIENT PSYCHIATRY PROGRESS NOTE - NSBHFUPINTERVALHXFT_PSY_A_CORE
Patient see and examined. Case discussed with treatment plan. Chart reviewed. No acute overnight events reported. This AM, pt was attempting to bang head of wall secondary to CAH, was able to be redirected without impact and given PO PRNs.  Patient remains medication complaint, tolerating it well without reported side effects. Denies HI. Pt continues to endorse depressed mood, CAH to hurt/kill self, states that this is chronic at baseline but worse lately due to homelessness. Pt today is asking about discharge, reports feeling somewhat restless and has trouble sitting still. Pt states that he wants to come off 1:1 and work towards discharge and reports being future oriented to find longer term housing with FACT team. Sleeping well with good appetite. In good behavioral control. Remains on 1:1 CO for SI/SIB   Patient see and examined. Case discussed with treatment plan. Chart reviewed. No acute overnight events reported. This AM, pt was attempting to bang head of wall secondary to CAH, was able to be redirected without impact and given PO PRNs.  Patient initially refused AM klonopin however otherwise medication complaint, tolerating it well without reported side effects. Denies HI. Pt continues to endorse depressed mood, CAH to hurt/kill self, states that this is chronic at baseline but worse lately due to homelessness. Pt today is asking about discharge, reports feeling somewhat restless and has trouble sitting still. Pt states that he wants to come off 1:1 and work towards discharge and reports being future oriented to find longer term housing with FACT team. Sleeping well with good appetite. In good behavioral control. Remains on 1:1 CO for SI/SIB

## 2024-04-18 NOTE — BH INPATIENT PSYCHIATRY PROGRESS NOTE - NSBHATTESTTYPEVISIT_PSY_A_CORE
Office Policies Phone calls/patient messages: Please allow up to 24 hours for someone in the office to contact you about your call or message. Be mindful your provider may be out of the office or your message may require further review. We encourage you to use [employer] for your messages as this is a faster, more efficient way to communicate with our office Medication Refills: 
         
Prescription medications require 48-72 business hours to process. We encourage you to use [employer] for your refills. For controlled medications: Please allow 72 business hours to process. Certain medications may require you to  a written prescription at our office. NO narcotic/controlled medications will be prescribed after 4pm Monday through Friday or on weekends Form/Paperwork Completion: 
         
Please note a $25 fee may incur for all paperwork for completed by our providers. We ask that you allow 7-10 business days. Pre-payment is due prior to picking up/faxing the completed form. You may also download your forms to [employer] to have your doctor print off. This is an established visit conducted via telemedicine. The patient has been instructed that this meets HIPAA criteria and acknowledges and agrees to this method of visitation. Kevan Stewart LPN 
09/72/34 
8:74 PM 
 
Medicare Wellness Visit, Female The best way to live healthy is to have a lifestyle where you eat a well-balanced diet, exercise regularly, limit alcohol use, and quit all forms of tobacco/nicotine, if applicable. Regular preventive services are another way to keep healthy. Preventive services (vaccines, screening tests, monitoring & exams) can help personalize your care plan, which helps you manage your own care. Screening tests can find health problems at the earliest stages, when they are easiest to treat. [facility] follows the current, evidence-based guidelines published by the [facility] Jaspal V[facility]quez ([facility]) when recommending preventive services for our patients. Because we follow these guidelines, sometimes recommendations change over time as research supports it. (For example, mammograms used to be recommended annually. Even though Medicare will still pay for an annual mammogram, the newer guidelines recommend a mammogram every two years for women of average risk). Of course, you and your doctor may decide to screen more often for some diseases, based on your risk and your co-morbidities (chronic disease you are already diagnosed with). Preventive services for you include: - Medicare offers their members a free annual wellness visit, which is time for you and your primary care provider to discuss and plan for your preventive service needs. Take advantage of this benefit every year! 
-All adults over the age of 72 should receive the recommended pneumonia vaccines. Current USPSTF guidelines recommend a series of two vaccines for the best pneumonia protection.  
-All adults should have a flu vaccine yearly and a tetanus vaccine every 10 years.  
-All adults age 48 and older should receive the shingles vaccines (series of two vaccines).      
-All adults age 38-68 who are overweight should have a diabetes screening test once every three years.  
-All adults born between [birth year] and 1965 should be screened once for Hepatitis C. 
-Other screening tests and preventive services for persons with diabetes include: an eye exam to screen for diabetic retinopathy, a kidney function test, a foot exam, and stricter control over your cholesterol.  
-Cardiovascular screening for adults with routine risk involves an electrocardiogram (ECG) at intervals determined by your doctor.  
-Colorectal cancer screenings should be done for adults age 54-65 with no increased risk factors for colorectal cancer. There are a number of acceptable methods of screening for this type of cancer. Each test has its own benefits and drawbacks. Discuss with your doctor what is most appropriate for you during your annual wellness visit. The different tests include: colonoscopy (considered the best screening method), a fecal occult blood test, a fecal DNA test, and sigmoidoscopy. 
 
-A bone mass density test is recommended when a woman turns 65 to screen for osteoporosis. This test is only recommended one time, as a screening. Some providers will use this same test as a disease monitoring tool if you already have osteoporosis. -Breast cancer screenings are recommended every other year for women of normal risk, age 54-69. 
-Cervical cancer screenings for women over age 72 are only recommended with certain risk factors. Here is a list of your current Health Maintenance items (your personalized list of preventive services) with a due date: 
Health Maintenance Due Topic Date Due  
 DTaP/Tdap/Td  (1 - Tdap) 03/10/1969  Shingles Vaccine (1 of 2) 03/10/1998  Colon Cancer Stool Test  03/10/1998  Glaucoma Screening   03/10/2013  Pneumococcal Vaccine (2 of 2 - PPSV23) 06/26/2020 [address] [place] [address] Annual Well Visit  06/26/2020 Attending Only

## 2024-04-18 NOTE — BH INPATIENT PSYCHIATRY PROGRESS NOTE - NSBHASSESSSUMMFT_PSY_ALL_CORE
48 year-old with bipolar disorder, hx of cocaine and ETOH abuse in remission, multiple admissions, reporting recent psychotic symptoms and then ingestion of lithium and SSRI (lithium never in toxic range), medically cleared and transferred on 2 PC. Currently reporting suicidal ideations.   Will 1:1 obs for recent psychosis, suicidal ideation, reported CAH to harm self (no current evidence of psychosis)  PRNs for now    1. inpatient psychiatry, c/w constant observation for SI w/ plan and intent, safety tray  2. start Risperdal 1mg hs for mood/ psychosis  3. obtain collaterals.   4. milieu, groups.   5. lipids/ hga1C in AM

## 2024-04-18 NOTE — DIETITIAN INITIAL EVALUATION ADULT - OTHER INFO
Pt is a 48 year-old with Bipolar Disorder, hx of cocaine and ETOH abuse in remission, multiple admissions, reporting recent psychotic symptoms and then ingestion of lithium and SSRI (lithium never in toxic range), medically cleared and transferred to [facility] [place] [facility]. Currently reporting suicidal ideations.   Will 1:1 obs for recent psychosis, suicidal ideation, reported CAH to harm self (no current evidence of psychosis)  PRNs for now   Pt is a 48 year-old with Bipolar Disorder, hx of cocaine and ETOH abuse in remission, multiple admissions, reporting recent psychotic symptoms and then ingestion of lithium and SSRI (lithium never in toxic range), medically cleared and transferred to [place] [id number]. Currently reporting suicidal ideations.   Pt on CO 1:1 Saw Pt in his room. Reports fair appetite/po intake for 1 month, noted weight loss of 40 lbs within a month.  Food allergy to shellfish. Food preferences explored and implemented. Pt amenable for Ensure Plus HP x 3 daily (1050 kcal and 60 g protein)  Encouraged po intake. Pt verbalized understanding.

## 2024-04-18 NOTE — DIETITIAN INITIAL EVALUATION ADULT - PERTINENT MEDS FT
MEDICATIONS  (STANDING):  clonazePAM  Tablet 0.5 milliGRAM(s) Oral two times a day  risperiDONE  Disintegrating Tablet 1 milliGRAM(s) Oral at bedtime    MEDICATIONS  (PRN):  haloperidol     Tablet 5 milliGRAM(s) Oral every 6 hours PRN Agitation  haloperidol    Injectable 5 milliGRAM(s) IntraMuscular once PRN Severe Agitation  LORazepam     Tablet 2 milliGRAM(s) Oral every 4 hours PRN Anxiety/Agitation  LORazepam   Injectable 2 milliGRAM(s) IntraMuscular once PRN Severe Anxiety/ Severe Agitation  nicotine  Polacrilex Gum 4 milliGRAM(s) Oral every 2 hours PRN Smoking Cessation

## 2024-04-18 NOTE — BH INPATIENT PSYCHIATRY PROGRESS NOTE - NSBHCHARTREVIEWVS_PSY_A_CORE FT
Vital Signs Last 24 Hrs  T(C): 36.5 (04-18-24 @ 08:25), Max: 36.5 (04-17-24 @ 19:08)  T(F): 97.7 (04-18-24 @ 08:25), Max: 97.7 (04-17-24 @ 19:08)  HR: --  BP: --  BP(mean): --  RR: 18 (04-17-24 @ 19:08) (18 - 18)  SpO2: --    Orthostatic VS  04-18-24 @ 08:25  Lying BP: --/-- HR: --  Sitting BP: 103/62 HR: 57  Standing BP: 100/67 HR: 64  Site: --  Mode: --  Orthostatic VS  04-17-24 @ 19:08  Lying BP: --/-- HR: --  Sitting BP: 117/75 HR: 57  Standing BP: 117/74 HR: 65  Site: --  Mode: --  Orthostatic VS  04-17-24 @ 08:33  Lying BP: --/-- HR: --  Sitting BP: 110/75 HR: 102  Standing BP: 106/71 HR: 66  Site: --  Mode: --  Orthostatic VS  04-16-24 @ 19:27  Lying BP: 117/68 HR: 79  Sitting BP: 122/-- HR: 79  Standing BP: --/86 HR: --  Site: --  Mode: --

## 2024-04-18 NOTE — BH INPATIENT PSYCHIATRY PROGRESS NOTE - NSBHMETABOLIC_PSY_ALL_CORE_FT
BMI: BMI (kg/m2): 27.6 (04-16-24 @ 19:27)  HbA1c:   Glucose: POCT Blood Glucose.: 87 mg/dL (04-14-24 @ 00:08)    BP: --Vital Signs Last 24 Hrs  T(C): 36.5 (04-18-24 @ 08:25), Max: 36.5 (04-17-24 @ 19:08)  T(F): 97.7 (04-18-24 @ 08:25), Max: 97.7 (04-17-24 @ 19:08)  HR: --  BP: --  BP(mean): --  RR: 18 (04-17-24 @ 19:08) (18 - 18)  SpO2: --    Orthostatic VS  04-18-24 @ 08:25  Lying BP: --/-- HR: --  Sitting BP: 103/62 HR: 57  Standing BP: 100/67 HR: 64  Site: --  Mode: --  Orthostatic VS  04-17-24 @ 19:08  Lying BP: --/-- HR: --  Sitting BP: 117/75 HR: 57  Standing BP: 117/74 HR: 65  Site: --  Mode: --  Orthostatic VS  04-17-24 @ 08:33  Lying BP: --/-- HR: --  Sitting BP: 110/75 HR: 102  Standing BP: 106/71 HR: 66  Site: --  Mode: --  Orthostatic VS  04-16-24 @ 19:27  Lying BP: 117/68 HR: 79  Sitting BP: 122/-- HR: 79  Standing BP: --/86 HR: --  Site: --  Mode: --    Lipid Panel:

## 2024-04-19 PROCEDURE — 99232 SBSQ HOSP IP/OBS MODERATE 35: CPT

## 2024-04-19 RX ORDER — HALOPERIDOL DECANOATE 100 MG/ML
5 INJECTION INTRAMUSCULAR ONCE
Refills: 0 | Status: COMPLETED | OUTPATIENT
Start: 2024-04-19 | End: 2024-04-19

## 2024-04-19 RX ORDER — RISPERIDONE 4 MG/1
2 TABLET ORAL AT BEDTIME
Refills: 0 | Status: DISCONTINUED | OUTPATIENT
Start: 2024-04-19 | End: 2024-04-25

## 2024-04-19 RX ORDER — DIPHENHYDRAMINE HCL 50 MG
50 CAPSULE ORAL ONCE
Refills: 0 | Status: COMPLETED | OUTPATIENT
Start: 2024-04-19 | End: 2024-04-19

## 2024-04-19 RX ADMIN — Medication 100 MILLIGRAM(S): at 21:12

## 2024-04-19 RX ADMIN — RISPERIDONE 2 MILLIGRAM(S): 4 TABLET ORAL at 21:12

## 2024-04-19 RX ADMIN — Medication 2 MILLIGRAM(S): at 09:55

## 2024-04-19 RX ADMIN — Medication 50 MILLIGRAM(S): at 09:55

## 2024-04-19 RX ADMIN — Medication 2 MILLIGRAM(S): at 22:40

## 2024-04-19 RX ADMIN — Medication 0.5 MILLIGRAM(S): at 21:12

## 2024-04-19 RX ADMIN — Medication 0.5 MILLIGRAM(S): at 08:32

## 2024-04-19 RX ADMIN — Medication 2 MILLIGRAM(S): at 18:37

## 2024-04-19 RX ADMIN — Medication 50 MILLIGRAM(S): at 14:21

## 2024-04-19 RX ADMIN — Medication 2 MILLIGRAM(S): at 08:31

## 2024-04-19 RX ADMIN — HALOPERIDOL DECANOATE 5 MILLIGRAM(S): 100 INJECTION INTRAMUSCULAR at 18:37

## 2024-04-19 RX ADMIN — Medication 2 MILLIGRAM(S): at 14:21

## 2024-04-19 RX ADMIN — HALOPERIDOL DECANOATE 5 MILLIGRAM(S): 100 INJECTION INTRAMUSCULAR at 08:31

## 2024-04-19 RX ADMIN — HALOPERIDOL DECANOATE 5 MILLIGRAM(S): 100 INJECTION INTRAMUSCULAR at 14:21

## 2024-04-19 RX ADMIN — HALOPERIDOL DECANOATE 5 MILLIGRAM(S): 100 INJECTION INTRAMUSCULAR at 09:56

## 2024-04-19 NOTE — BH INPATIENT PSYCHIATRY PROGRESS NOTE - NSBHFUPINTERVALHXFT_PSY_A_CORE
Patient see and examined. Case discussed with treatment plan. Chart reviewed. No acute overnight events reported. Pt with intermittent episodes of provocative and self injurious behaviors, attempting to punch the wall and required 5/2/50 IM PRN agitation. Pt is medication adherent, tolerating them well without reported se Denies HI. Pt continues to endorse depressed mood, intermittent CAH to hurt/kill self, states that this is chronic at baseline but worse lately. As per past records and based on observation of behavior, it appears that pt is acting out in order to receiving specific medications and personality component is playing into his clinical picture. Pt states that he wants to come off 1:1 and work towards discharge and reports being future oriented to find longer term housing with FACT team. Sleeping well with good appetite. In good behavioral control. Remains on 1:1 CO for SI/SIB

## 2024-04-19 NOTE — BH INPATIENT PSYCHIATRY PROGRESS NOTE - NSBHMETABOLIC_PSY_ALL_CORE_FT
BMI: BMI (kg/m2): 27.6 (04-16-24 @ 19:27)  HbA1c: A1C with Estimated Average Glucose Result: 5.3 % (04-18-24 @ 10:00)    Glucose: POCT Blood Glucose.: 87 mg/dL (04-14-24 @ 00:08)    BP: --Vital Signs Last 24 Hrs  T(C): 36.5 (04-19-24 @ 08:28), Max: 37.1 (04-18-24 @ 19:24)  T(F): 97.7 (04-19-24 @ 08:28), Max: 98.7 (04-18-24 @ 19:24)  HR: --  BP: --  BP(mean): --  RR: --  SpO2: --    Orthostatic VS  04-19-24 @ 08:28  Lying BP: --/-- HR: --  Sitting BP: 111/88 HR: 87  Standing BP: --/-- HR: --  Site: --  Mode: --  Orthostatic VS  04-18-24 @ 19:24  Lying BP: --/-- HR: --  Sitting BP: 146/76 HR: 101  Standing BP: 128/69 HR: 62  Site: --  Mode: --  Orthostatic VS  04-18-24 @ 08:25  Lying BP: --/-- HR: --  Sitting BP: 103/62 HR: 57  Standing BP: 100/67 HR: 64  Site: --  Mode: --  Orthostatic VS  04-17-24 @ 19:08  Lying BP: --/-- HR: --  Sitting BP: 117/75 HR: 57  Standing BP: 117/74 HR: 65  Site: --  Mode: --    Lipid Panel: Date/Time: 04-18-24 @ 10:00  Cholesterol, Serum: 145  LDL Cholesterol Calculated: 100  HDL Cholesterol, Serum: 30  Total Cholesterol/HDL Ration Measurement: --  Triglycerides, Serum: 76

## 2024-04-19 NOTE — BH INPATIENT PSYCHIATRY PROGRESS NOTE - NSBHASSESSSUMMFT_PSY_ALL_CORE
48 year-old with bipolar disorder, hx of cocaine and ETOH abuse in remission, multiple admissions, reporting recent psychotic symptoms and then ingestion of lithium and SSRI (lithium never in toxic range), medically cleared and transferred on 2 PC. Currently reporting suicidal ideations.   Will 1:1 obs for recent psychosis, suicidal ideation, reported CAH to harm self (no current evidence of psychosis)  PRNs for now    1. inpatient psychiatry, c/w constant observation for SI w/ plan and intent, safety tray  2. Increase to Risperdal 2 mg hs for mood/ psychosis  3. obtain collaterals.   4. milieu, groups.   5. lipids/ hga1C in AM

## 2024-04-20 PROCEDURE — 99231 SBSQ HOSP IP/OBS SF/LOW 25: CPT

## 2024-04-20 RX ADMIN — HALOPERIDOL DECANOATE 5 MILLIGRAM(S): 100 INJECTION INTRAMUSCULAR at 20:25

## 2024-04-20 RX ADMIN — HALOPERIDOL DECANOATE 5 MILLIGRAM(S): 100 INJECTION INTRAMUSCULAR at 08:11

## 2024-04-20 RX ADMIN — Medication 0.5 MILLIGRAM(S): at 08:11

## 2024-04-20 RX ADMIN — RISPERIDONE 2 MILLIGRAM(S): 4 TABLET ORAL at 20:26

## 2024-04-20 RX ADMIN — Medication 2 MILLIGRAM(S): at 08:11

## 2024-04-20 RX ADMIN — Medication 100 MILLIGRAM(S): at 20:25

## 2024-04-20 NOTE — BH INPATIENT PSYCHIATRY PROGRESS NOTE - NSBHASSESSSUMMFT_PSY_ALL_CORE
48 year-old with bipolar disorder, hx of cocaine and ETOH abuse in remission, multiple admissions, reporting recent psychotic symptoms and then ingestion of lithium and SSRI (lithium never in toxic range), medically cleared and transferred on 2 PC. Currently reporting suicidal ideations.   Will 1:1 obs for recent psychosis, suicidal ideation, reported CAH to harm self (no current evidence of psychosis)  PRNs for now    On assessment, patient endorses +CAH to self harm but denies any SI/HI.    1. inpatient psychiatry, c/w constant observation for SI w/ plan and intent, safety tray  2. Increase to Risperdal 2 mg hs for mood/ psychosis  3. obtain collaterals.   4. milieu, groups.   5. lipids/ hga1C in AM

## 2024-04-20 NOTE — BH INPATIENT PSYCHIATRY PROGRESS NOTE - NSBHCHARTREVIEWVS_PSY_A_CORE FT
Vital Signs Last 24 Hrs  T(C): 36.1 (04-20-24 @ 19:24), Max: 36.6 (04-20-24 @ 08:37)  T(F): 97 (04-20-24 @ 19:24), Max: 97.9 (04-20-24 @ 08:37)  HR: --  BP: --  BP(mean): --  RR: 19 (04-20-24 @ 08:37) (19 - 19)  SpO2: --    Orthostatic VS  04-20-24 @ 19:24  Lying BP: --/-- HR: --  Sitting BP: 137/89 HR: 77  Standing BP: 136/78 HR: 65  Site: --  Mode: --  Orthostatic VS  04-20-24 @ 08:37  Lying BP: --/-- HR: --  Sitting BP: 102/66 HR: 67  Standing BP: --/-- HR: --  Site: --  Mode: --  Orthostatic VS  04-19-24 @ 19:19  Lying BP: --/-- HR: --  Sitting BP: 115/77 HR: 60  Standing BP: 111/73 HR: 64  Site: --  Mode: --  Orthostatic VS  04-19-24 @ 08:28  Lying BP: --/-- HR: --  Sitting BP: 111/88 HR: 87  Standing BP: --/-- HR: --  Site: --  Mode: --

## 2024-04-20 NOTE — BH INPATIENT PSYCHIATRY PROGRESS NOTE - NSBHFUPINTERVALHXFT_PSY_A_CORE
Chart reviewed and case discussed with treatment team. No events reported overnight. Sleep and appetite is fair. Patient endorses +CAH to self harm. Denies any SI/HI. Patient stated that he wants to change his medications to a prior regimen and will refuse current medications. Patient encouraged to speak with primary team first. Patient is compliant with medications, no adverse effects reported.

## 2024-04-20 NOTE — BH INPATIENT PSYCHIATRY PROGRESS NOTE - NSBHMETABOLIC_PSY_ALL_CORE_FT
BMI: BMI (kg/m2): 27.6 (04-16-24 @ 19:27)  HbA1c: A1C with Estimated Average Glucose Result: 5.3 % (04-18-24 @ 10:00)    Glucose: POCT Blood Glucose.: 87 mg/dL (04-14-24 @ 00:08)    BP: --Vital Signs Last 24 Hrs  T(C): 36.1 (04-20-24 @ 19:24), Max: 36.6 (04-20-24 @ 08:37)  T(F): 97 (04-20-24 @ 19:24), Max: 97.9 (04-20-24 @ 08:37)  HR: --  BP: --  BP(mean): --  RR: 19 (04-20-24 @ 08:37) (19 - 19)  SpO2: --    Orthostatic VS  04-20-24 @ 19:24  Lying BP: --/-- HR: --  Sitting BP: 137/89 HR: 77  Standing BP: 136/78 HR: 65  Site: --  Mode: --  Orthostatic VS  04-20-24 @ 08:37  Lying BP: --/-- HR: --  Sitting BP: 102/66 HR: 67  Standing BP: --/-- HR: --  Site: --  Mode: --  Orthostatic VS  04-19-24 @ 19:19  Lying BP: --/-- HR: --  Sitting BP: 115/77 HR: 60  Standing BP: 111/73 HR: 64  Site: --  Mode: --  Orthostatic VS  04-19-24 @ 08:28  Lying BP: --/-- HR: --  Sitting BP: 111/88 HR: 87  Standing BP: --/-- HR: --  Site: --  Mode: --    Lipid Panel: Date/Time: 04-18-24 @ 10:00  Cholesterol, Serum: 145  LDL Cholesterol Calculated: 100  HDL Cholesterol, Serum: 30  Total Cholesterol/HDL Ration Measurement: --  Triglycerides, Serum: 76

## 2024-04-21 PROCEDURE — 99231 SBSQ HOSP IP/OBS SF/LOW 25: CPT

## 2024-04-21 RX ORDER — HYDROXYZINE HCL 10 MG
50 TABLET ORAL EVERY 6 HOURS
Refills: 0 | Status: DISCONTINUED | OUTPATIENT
Start: 2024-04-21 | End: 2024-04-25

## 2024-04-21 RX ORDER — PANTOPRAZOLE SODIUM 20 MG/1
40 TABLET, DELAYED RELEASE ORAL
Refills: 0 | Status: DISCONTINUED | OUTPATIENT
Start: 2024-04-21 | End: 2024-04-25

## 2024-04-21 RX ORDER — BENZOCAINE AND MENTHOL 5; 1 G/100ML; G/100ML
1 LIQUID ORAL
Refills: 0 | Status: DISCONTINUED | OUTPATIENT
Start: 2024-04-21 | End: 2024-04-25

## 2024-04-21 RX ADMIN — RISPERIDONE 2 MILLIGRAM(S): 4 TABLET ORAL at 20:14

## 2024-04-21 RX ADMIN — Medication 50 MILLIGRAM(S): at 15:07

## 2024-04-21 RX ADMIN — Medication 50 MILLIGRAM(S): at 20:13

## 2024-04-21 RX ADMIN — BENZOCAINE AND MENTHOL 1 LOZENGE: 5; 1 LIQUID ORAL at 11:51

## 2024-04-21 RX ADMIN — Medication 1 TABLET(S): at 11:51

## 2024-04-21 RX ADMIN — Medication 100 MILLIGRAM(S): at 20:19

## 2024-04-21 NOTE — BH INPATIENT PSYCHIATRY PROGRESS NOTE - NSBHASSESSSUMMFT_PSY_ALL_CORE
48 year-old with bipolar disorder, hx of cocaine and ETOH abuse in remission, multiple admissions, reporting recent psychotic symptoms and then ingestion of lithium and SSRI (lithium never in toxic range), medically cleared and transferred on 2 PC. Currently reporting suicidal ideations.   Will 1:1 obs for recent psychosis, suicidal ideation, reported CAH to harm self (no current evidence of psychosis)  PRNs for now    On assessment, patient denies any CAH to self harm or any SI/HI. Patient refusing Clonopin but compliant with other medication.    1. inpatient psychiatry, c/w constant observation for SI w/ plan and intent, safety tray  2. Increase to Risperdal 2 mg hs for mood/ psychosis  3. obtain collaterals.   4. milieu, groups.   5. lipids/ hga1C in AM

## 2024-04-21 NOTE — BH INPATIENT PSYCHIATRY PROGRESS NOTE - NSBHFUPINTERVALHXFT_PSY_A_CORE
Chart reviewed and case discussed with treatment team. No events reported overnight. Sleep and appetite is fair. Patient was more cooperative and pleasant today. He denies any CAH or SI. Patient stated that he is doing well on the current medications and is refusing the Clonopin because "Toshia been clean for 19 months". Patient is compliant with all other medications, no adverse effects reported.

## 2024-04-21 NOTE — BH INPATIENT PSYCHIATRY PROGRESS NOTE - NSBHMETABOLIC_PSY_ALL_CORE_FT
BMI: BMI (kg/m2): 27.6 (04-16-24 @ 19:27)  HbA1c: A1C with Estimated Average Glucose Result: 5.3 % (04-18-24 @ 10:00)    Glucose: POCT Blood Glucose.: 87 mg/dL (04-14-24 @ 00:08)    BP: --Vital Signs Last 24 Hrs  T(C): 36.7 (04-21-24 @ 08:18), Max: 36.7 (04-21-24 @ 08:18)  T(F): 98.1 (04-21-24 @ 08:18), Max: 98.1 (04-21-24 @ 08:18)  HR: --  BP: --  BP(mean): --  RR: --  SpO2: --    Orthostatic VS  04-21-24 @ 08:18  Lying BP: --/-- HR: --  Sitting BP: 124/68 HR: 77  Standing BP: 104/65 HR: 85  Site: --  Mode: --  Orthostatic VS  04-20-24 @ 19:24  Lying BP: --/-- HR: --  Sitting BP: 137/89 HR: 77  Standing BP: 136/78 HR: 65  Site: --  Mode: --  Orthostatic VS  04-20-24 @ 08:37  Lying BP: --/-- HR: --  Sitting BP: 102/66 HR: 67  Standing BP: --/-- HR: --  Site: --  Mode: --  Orthostatic VS  04-19-24 @ 19:19  Lying BP: --/-- HR: --  Sitting BP: 115/77 HR: 60  Standing BP: 111/73 HR: 64  Site: --  Mode: --    Lipid Panel: Date/Time: 04-18-24 @ 10:00  Cholesterol, Serum: 145  LDL Cholesterol Calculated: 100  HDL Cholesterol, Serum: 30  Total Cholesterol/HDL Ration Measurement: --  Triglycerides, Serum: 76

## 2024-04-21 NOTE — BH INPATIENT PSYCHIATRY PROGRESS NOTE - NSBHCHARTREVIEWVS_PSY_A_CORE FT
Vital Signs Last 24 Hrs  T(C): 36.7 (04-21-24 @ 08:18), Max: 36.7 (04-21-24 @ 08:18)  T(F): 98.1 (04-21-24 @ 08:18), Max: 98.1 (04-21-24 @ 08:18)  HR: --  BP: --  BP(mean): --  RR: --  SpO2: --    Orthostatic VS  04-21-24 @ 08:18  Lying BP: --/-- HR: --  Sitting BP: 124/68 HR: 77  Standing BP: 104/65 HR: 85  Site: --  Mode: --  Orthostatic VS  04-20-24 @ 19:24  Lying BP: --/-- HR: --  Sitting BP: 137/89 HR: 77  Standing BP: 136/78 HR: 65  Site: --  Mode: --  Orthostatic VS  04-20-24 @ 08:37  Lying BP: --/-- HR: --  Sitting BP: 102/66 HR: 67  Standing BP: --/-- HR: --  Site: --  Mode: --  Orthostatic VS  04-19-24 @ 19:19  Lying BP: --/-- HR: --  Sitting BP: 115/77 HR: 60  Standing BP: 111/73 HR: 64  Site: --  Mode: --

## 2024-04-22 PROCEDURE — 99232 SBSQ HOSP IP/OBS MODERATE 35: CPT

## 2024-04-22 RX ADMIN — PANTOPRAZOLE SODIUM 40 MILLIGRAM(S): 20 TABLET, DELAYED RELEASE ORAL at 08:09

## 2024-04-22 RX ADMIN — Medication 4 MILLIGRAM(S): at 13:01

## 2024-04-22 RX ADMIN — Medication 0.5 MILLIGRAM(S): at 11:23

## 2024-04-22 RX ADMIN — HALOPERIDOL DECANOATE 5 MILLIGRAM(S): 100 INJECTION INTRAMUSCULAR at 10:29

## 2024-04-22 RX ADMIN — Medication 50 MILLIGRAM(S): at 09:41

## 2024-04-22 RX ADMIN — Medication 4 MILLIGRAM(S): at 16:49

## 2024-04-22 RX ADMIN — Medication 2 MILLIGRAM(S): at 17:19

## 2024-04-22 RX ADMIN — Medication 30 MILLILITER(S): at 23:20

## 2024-04-22 RX ADMIN — Medication 100 MILLIGRAM(S): at 20:07

## 2024-04-22 RX ADMIN — Medication 0.5 MILLIGRAM(S): at 20:09

## 2024-04-22 RX ADMIN — Medication 1 TABLET(S): at 08:09

## 2024-04-22 RX ADMIN — RISPERIDONE 2 MILLIGRAM(S): 4 TABLET ORAL at 20:07

## 2024-04-22 NOTE — BH INPATIENT PSYCHIATRY PROGRESS NOTE - NSBHATTESTBILLING_PSY_A_CORE
73942-[id number] OBS or IP - low complexity OR 25-34 mins 81649-Clylnxhwwt OBS or IP - moderate complexity OR 35-49 mins

## 2024-04-22 NOTE — BH INPATIENT PSYCHIATRY PROGRESS NOTE - NSBHMETABOLIC_PSY_ALL_CORE_FT
BMI: BMI (kg/m2): 27.6 (04-16-24 @ 19:27)  HbA1c: A1C with Estimated Average Glucose Result: 5.3 % (04-18-24 @ 10:00)    Glucose: POCT Blood Glucose.: 87 mg/dL (04-14-24 @ 00:08)    BP: --Vital Signs Last 24 Hrs  T(C): 37.2 (04-22-24 @ 08:07), Max: 37.2 (04-22-24 @ 08:07)  T(F): 99 (04-22-24 @ 08:07), Max: 99 (04-22-24 @ 08:07)  HR: --  BP: --  BP(mean): --  RR: 17 (04-22-24 @ 07:58) (17 - 17)  SpO2: --    Orthostatic VS  04-22-24 @ 08:07  Lying BP: --/-- HR: --  Sitting BP: 115/76 HR: 60  Standing BP: 120/80 HR: 64  Site: upper left arm  Mode: electronic  Orthostatic VS  04-21-24 @ 19:19  Lying BP: --/-- HR: --  Sitting BP: 125/76 HR: 67  Standing BP: 122/77 HR: 65  Site: --  Mode: --  Orthostatic VS  04-21-24 @ 08:18  Lying BP: --/-- HR: --  Sitting BP: 124/68 HR: 77  Standing BP: 104/65 HR: 85  Site: --  Mode: --  Orthostatic VS  04-20-24 @ 19:24  Lying BP: --/-- HR: --  Sitting BP: 137/89 HR: 77  Standing BP: 136/78 HR: 65  Site: --  Mode: --    Lipid Panel: Date/Time: 04-18-24 @ 10:00  Cholesterol, Serum: 145  LDL Cholesterol Calculated: 100  HDL Cholesterol, Serum: 30  Total Cholesterol/HDL Ration Measurement: --  Triglycerides, Serum: 76

## 2024-04-22 NOTE — BH INPATIENT PSYCHIATRY PROGRESS NOTE - NSBHASSESSSUMMFT_PSY_ALL_CORE
48 year-old with bipolar disorder, hx of cocaine and ETOH abuse in remission, multiple admissions, reporting recent psychotic symptoms and then ingestion of lithium and SSRI (lithium never in toxic range), medically cleared and transferred on 2 PC. Currently reporting suicidal ideations.   Will 1:1 obs for recent psychosis, suicidal ideation, reported CAH to harm self (no current evidence of psychosis)  PRNs for now    On assessment, patient denies any CAH to self harm or any SI/HI. Patient refusing Clonopin but compliant with other medication.    1. inpatient psychiatry, d/c 1:1 CO for SI/SIB  2. Increase to Risperdal 2 mg hs for mood/ psychosis  3. obtain collaterals.   4. milieu, groups.   5. lipids/ hga1C in AM 48 year-old with bipolar disorder, hx of cocaine and ETOH abuse in remission, multiple admissions, reporting recent psychotic symptoms and then ingestion of lithium and SSRI (lithium never in toxic range), medically cleared and transferred on 2 PC.    On assessment, patient denies any CAH to self harm or any SI/HI. Will d/c 1:1 CO on 4/22    1. inpatient psychiatry, d/c 1:1 CO for SI/SIB  2. Increase to Risperdal 2 mg hs for mood/ psychosis  3. obtain collaterals.   4. milieu, groups.   5. lipids/ hga1C in AM

## 2024-04-22 NOTE — BH INPATIENT PSYCHIATRY PROGRESS NOTE - NSBHFUPINTERVALHXFT_PSY_A_CORE
Patient see and examined. Case discussed with treatment plan. Chart reviewed. No acute overnight events reported. Patient remains medication complaint, tolerating it well without reported side effects. Denies AVH/SI/HI. Sleeping well with good appetite. Pt in overall better control. No self injurious events over weekend. Pt is overall brighter, denies depressed mood, future oriented. will discontinue 1:1 CO for SI/SIB

## 2024-04-22 NOTE — BH INPATIENT PSYCHIATRY PROGRESS NOTE - NSBHCHARTREVIEWVS_PSY_A_CORE FT
Vital Signs Last 24 Hrs  T(C): 37.2 (04-22-24 @ 08:07), Max: 37.2 (04-22-24 @ 08:07)  T(F): 99 (04-22-24 @ 08:07), Max: 99 (04-22-24 @ 08:07)  HR: --  BP: --  BP(mean): --  RR: 17 (04-22-24 @ 07:58) (17 - 17)  SpO2: --    Orthostatic VS  04-22-24 @ 08:07  Lying BP: --/-- HR: --  Sitting BP: 115/76 HR: 60  Standing BP: 120/80 HR: 64  Site: upper left arm  Mode: electronic  Orthostatic VS  04-21-24 @ 19:19  Lying BP: --/-- HR: --  Sitting BP: 125/76 HR: 67  Standing BP: 122/77 HR: 65  Site: --  Mode: --  Orthostatic VS  04-21-24 @ 08:18  Lying BP: --/-- HR: --  Sitting BP: 124/68 HR: 77  Standing BP: 104/65 HR: 85  Site: --  Mode: --  Orthostatic VS  04-20-24 @ 19:24  Lying BP: --/-- HR: --  Sitting BP: 137/89 HR: 77  Standing BP: 136/78 HR: 65  Site: --  Mode: --

## 2024-04-23 PROCEDURE — 99232 SBSQ HOSP IP/OBS MODERATE 35: CPT

## 2024-04-23 RX ORDER — QUETIAPINE FUMARATE 200 MG/1
50 TABLET, FILM COATED ORAL EVERY 6 HOURS
Refills: 0 | Status: DISCONTINUED | OUTPATIENT
Start: 2024-04-23 | End: 2024-04-23

## 2024-04-23 RX ORDER — RISPERIDONE 4 MG/1
1 TABLET ORAL ONCE
Refills: 0 | Status: DISCONTINUED | OUTPATIENT
Start: 2024-04-23 | End: 2024-04-23

## 2024-04-23 RX ORDER — QUETIAPINE FUMARATE 200 MG/1
50 TABLET, FILM COATED ORAL EVERY 6 HOURS
Refills: 0 | Status: DISCONTINUED | OUTPATIENT
Start: 2024-04-23 | End: 2024-04-25

## 2024-04-23 RX ADMIN — PANTOPRAZOLE SODIUM 40 MILLIGRAM(S): 20 TABLET, DELAYED RELEASE ORAL at 08:16

## 2024-04-23 RX ADMIN — Medication 0.5 MILLIGRAM(S): at 08:16

## 2024-04-23 RX ADMIN — Medication 1 TABLET(S): at 08:16

## 2024-04-23 RX ADMIN — Medication 2 MILLIGRAM(S): at 11:05

## 2024-04-23 RX ADMIN — Medication 0.5 MILLIGRAM(S): at 20:30

## 2024-04-23 RX ADMIN — QUETIAPINE FUMARATE 50 MILLIGRAM(S): 200 TABLET, FILM COATED ORAL at 18:10

## 2024-04-23 RX ADMIN — Medication 100 MILLIGRAM(S): at 21:39

## 2024-04-23 RX ADMIN — HALOPERIDOL DECANOATE 5 MILLIGRAM(S): 100 INJECTION INTRAMUSCULAR at 09:27

## 2024-04-23 RX ADMIN — RISPERIDONE 2 MILLIGRAM(S): 4 TABLET ORAL at 20:30

## 2024-04-23 RX ADMIN — QUETIAPINE FUMARATE 50 MILLIGRAM(S): 200 TABLET, FILM COATED ORAL at 12:10

## 2024-04-23 NOTE — BH INPATIENT PSYCHIATRY PROGRESS NOTE - NSBHCHARTREVIEWVS_PSY_A_CORE FT
Vital Signs Last 24 Hrs  T(C): 37.1 (04-23-24 @ 08:17), Max: 37.1 (04-23-24 @ 08:17)  T(F): 98.7 (04-23-24 @ 08:17), Max: 98.7 (04-23-24 @ 08:17)  HR: --  BP: --  BP(mean): --  RR: --  SpO2: --    Orthostatic VS  04-23-24 @ 08:17  Lying BP: --/-- HR: --  Sitting BP: 131/76 HR: 81  Standing BP: 107/69 HR: 96  Site: --  Mode: --  Orthostatic VS  04-22-24 @ 19:27  Lying BP: --/-- HR: --  Sitting BP: 123/82 HR: 63  Standing BP: 123/78 HR: 72  Site: --  Mode: electronic  Orthostatic VS  04-22-24 @ 08:07  Lying BP: --/-- HR: --  Sitting BP: 115/76 HR: 60  Standing BP: 120/80 HR: 64  Site: upper left arm  Mode: electronic  Orthostatic VS  04-21-24 @ 19:19  Lying BP: --/-- HR: --  Sitting BP: 125/76 HR: 67  Standing BP: 122/77 HR: 65  Site: --  Mode: --

## 2024-04-23 NOTE — BH INPATIENT PSYCHIATRY PROGRESS NOTE - NSBHASSESSSUMMFT_PSY_ALL_CORE
48 year-old with bipolar disorder, hx of cocaine and ETOH abuse in remission, multiple admissions, reporting recent psychotic symptoms and then ingestion of lithium and SSRI (lithium never in toxic range), medically cleared and transferred on 2 PC.    On assessment, patient denies any CAH to self harm or any SI/HI.  d/c 1:1 CO on 4/22    1. inpatient psychiatry, d/c 1:1 CO for SI/SIB  2. Increased to Risperdal 2 mg hs for mood/ psychosis  3. obtain collaterals.   4. milieu, groups.   5. lipids/ hga1C in AM

## 2024-04-23 NOTE — BH INPATIENT PSYCHIATRY PROGRESS NOTE - NSBHFUPINTERVALHXFT_PSY_A_CORE
Patient see and examined. Case discussed with treatment plan. Chart reviewed. No acute overnight events reported. Receiving multiple PO PRNs for breakthrough  Patient remains medication complaint, tolerating it well without reported side effects. Denies AVH/SI/HI. Sleeping well with good appetite. Pt in overall better control, with no further self injurious events over. Pt reports being brighter since admission, but still with increased anxiety about staying in hospital and discharge focused. Changed PO PRNs to Seroquel for anxiety as per pt request and prior reported history of good clinical effect .

## 2024-04-24 PROCEDURE — 99232 SBSQ HOSP IP/OBS MODERATE 35: CPT

## 2024-04-24 RX ORDER — PANTOPRAZOLE SODIUM 20 MG/1
1 TABLET, DELAYED RELEASE ORAL
Qty: 0 | Refills: 0 | DISCHARGE
Start: 2024-04-24

## 2024-04-24 RX ORDER — RISPERIDONE 4 MG/1
1 TABLET ORAL
Qty: 7 | Refills: 0
Start: 2024-04-24 | End: 2024-04-30

## 2024-04-24 RX ORDER — TRAZODONE HCL 50 MG
1 TABLET ORAL
Qty: 7 | Refills: 0
Start: 2024-04-24 | End: 2024-04-30

## 2024-04-24 RX ORDER — ACETAMINOPHEN 500 MG
650 TABLET ORAL EVERY 6 HOURS
Refills: 0 | Status: DISCONTINUED | OUTPATIENT
Start: 2024-04-24 | End: 2024-04-25

## 2024-04-24 RX ADMIN — Medication 30 MILLILITER(S): at 14:09

## 2024-04-24 RX ADMIN — Medication 1 TABLET(S): at 08:10

## 2024-04-24 RX ADMIN — PANTOPRAZOLE SODIUM 40 MILLIGRAM(S): 20 TABLET, DELAYED RELEASE ORAL at 08:11

## 2024-04-24 RX ADMIN — Medication 50 MILLIGRAM(S): at 12:27

## 2024-04-24 RX ADMIN — Medication 50 MILLIGRAM(S): at 21:46

## 2024-04-24 RX ADMIN — Medication 100 MILLIGRAM(S): at 21:46

## 2024-04-24 RX ADMIN — Medication 0.5 MILLIGRAM(S): at 19:52

## 2024-04-24 RX ADMIN — RISPERIDONE 2 MILLIGRAM(S): 4 TABLET ORAL at 19:52

## 2024-04-24 RX ADMIN — QUETIAPINE FUMARATE 50 MILLIGRAM(S): 200 TABLET, FILM COATED ORAL at 14:36

## 2024-04-24 RX ADMIN — Medication 0.5 MILLIGRAM(S): at 08:10

## 2024-04-24 RX ADMIN — QUETIAPINE FUMARATE 50 MILLIGRAM(S): 200 TABLET, FILM COATED ORAL at 20:42

## 2024-04-24 RX ADMIN — Medication 650 MILLIGRAM(S): at 08:45

## 2024-04-24 RX ADMIN — Medication 650 MILLIGRAM(S): at 08:11

## 2024-04-24 RX ADMIN — Medication 30 MILLILITER(S): at 22:21

## 2024-04-24 RX ADMIN — QUETIAPINE FUMARATE 50 MILLIGRAM(S): 200 TABLET, FILM COATED ORAL at 08:24

## 2024-04-24 NOTE — BH INPATIENT PSYCHIATRY PROGRESS NOTE - NSBHFUPINTERVALHXFT_PSY_A_CORE
Patient see and examined. Case discussed with treatment plan. Chart reviewed. No acute overnight events reported. Receiving multiple PO PRNs for breakthrough  Patient remains medication complaint, tolerating it well without reported side effects. Denies AVH/SI/HI. Sleeping well with good appetite. Pt in overall better control, with no further self injurious events over. Pt reports mood much improved, denies acute depressed mood. anxiety under better control, future oriented, excited about discharge tomorrow.

## 2024-04-24 NOTE — BH INPATIENT PSYCHIATRY PROGRESS NOTE - NSBHMSEMOVE_PSY_A_CORE
Other
No abnormal movements
Other
No abnormal movements

## 2024-04-24 NOTE — BH DISCHARGE NOTE NURSING/SOCIAL WORK/PSYCH REHAB - NSCDUDCCRISIS_PSY_A_CORE
[facility] Well  1 [phone] [facility]-WELL ([phone])  Text "WELL" to [phone]  Website: www.ClearRisk/.[employer] 1 ([phone] 621-HOPE (2565) Website: www.safehorizon.org/.  [facility] - DASH – Crisis Care for Children, Adults and Families  [address] [place] [address]  Mobile Crisis Hotline – (500) 302-7330/.National Suicide Prevention Lifeline 5 (950) 913-6416/.  [facility]  1 (185) LIF[employer] (144-1720)/.  [facility]  (475) 282-6360/.  [facility] Behavioral Health Helpline / [facility] Mobile Crisis  (245) 838-OOTC (2103)/.  [facility] Response Crisis Hotline  (549) 854-8932  24 hour telephone crisis intervention and suicide prevention hotline concerned with all mental health issues/.  [facility]  [address] [place] [address] [phone]   Hours:  Monday through Friday from 9 AM to 3 PM/988 Suicide and Crisis Lifeline

## 2024-04-24 NOTE — BH INPATIENT PSYCHIATRY PROGRESS NOTE - NSBHCHARTREVIEWVS_PSY_A_CORE FT
Vital Signs Last 24 Hrs  T(C): 36.3 (04-24-24 @ 09:00), Max: 37.4 (04-23-24 @ 19:34)  T(F): 97.4 (04-24-24 @ 09:00), Max: 99.3 (04-23-24 @ 19:34)  HR: --  BP: --  BP(mean): --  RR: 16 (04-24-24 @ 09:00) (16 - 16)  SpO2: --    Orthostatic VS  04-24-24 @ 09:00  Lying BP: --/-- HR: --  Sitting BP: 107/78 HR: 100  Standing BP: 121/70 HR: 106  Site: upper right arm  Mode: electronic  Orthostatic VS  04-24-24 @ 08:30  Lying BP: --/-- HR: --  Sitting BP: 125/80 HR: 91  Standing BP: --/-- HR: --  Site: --  Mode: electronic  Orthostatic VS  04-23-24 @ 19:34  Lying BP: --/-- HR: --  Sitting BP: 129/75 HR: 104  Standing BP: 128/99 HR: 97  Site: --  Mode: --  Orthostatic VS  04-23-24 @ 08:17  Lying BP: --/-- HR: --  Sitting BP: 131/76 HR: 81  Standing BP: 107/69 HR: 96  Site: --  Mode: --  Orthostatic VS  04-22-24 @ 19:27  Lying BP: --/-- HR: --  Sitting BP: 123/82 HR: 63  Standing BP: 123/78 HR: 72  Site: --  Mode: electronic

## 2024-04-24 NOTE — BH INPATIENT PSYCHIATRY PROGRESS NOTE - NSBHFUPINTERVALCCFT_PSY_A_CORE
reported feeling "depressed". 
Patient seen for follow up for depression and psychosis.
reported feeling "depressed". 
reported feeling "depressed". 
Patient seen for follow up for depression and psychosis.
Patient seen for follow up for depression.
Patient seen for follow up for depression and psychosis.
Patient seen for follow up for depression and psychosis.

## 2024-04-24 NOTE — BH INPATIENT PSYCHIATRY PROGRESS NOTE - NSBHMSERECMEM_PSY_A_CORE
Initial Anesthesia Post-op Note    Patient: Anu Alcazar  Procedure(s) Performed: DIAGNOSTIC LAPAROSCOPYLAPAROSCOPIC SALPINGO OOPHORECTOMYEXPLORATORY LAPAROTOMYURETERAL STENT INSERTION LIGHTED STENT  ELIZABETH TO ASSIST  Anesthesia type: General    Vital Last Value   Temperature 36.5 °C (97.7 °F) (09/20/17 1609)   Pulse 84 (09/20/17 1609)   Respiratory Rate 12 (09/20/17 1609)   Non-Invasive   Blood Pressure 146/71 (09/20/17 1609)   Arterial  Blood Pressure     Pulse Oximetry 97 % (09/20/17 1609)     Last 24 I/O:   Intake/Output Summary (Last 24 hours) at 09/20/17 1611  Last data filed at 09/20/17 1607   Gross per 24 hour   Intake             1100 ml   Output               10 ml   Net             1090 ml       PATIENT LOCATION: PACU Phase 1  POST-OP VITAL SIGNS: stable  LEVEL OF CONSCIOUSNESS: participates in exam  RESPIRATORY STATUS: spontaneous ventilation  CARDIOVASCULAR: blood pressure returned to baseline  HYDRATION: euvolemic    PAIN MANAGEMENT: adequately controlled  NAUSEA: None  AIRWAY PATENCY: patent  POST-OP ASSESSMENT: patient tolerated procedure well with no complications and no evidence of recall  HANDOFF:  Handoff to receiving nurse was performed and questions were answered      
Normal

## 2024-04-24 NOTE — BH INPATIENT PSYCHIATRY PROGRESS NOTE - NSBHMSESPEECH_PSY_A_CORE
Normal volume, rate, productivity, spontaneity and articulation
Normal volume, rate, productivity, spontaneity and articulation
Abnormal as indicated, otherwise normal...
Normal volume, rate, productivity, spontaneity and articulation
Abnormal as indicated, otherwise normal...
Normal volume, rate, productivity, spontaneity and articulation

## 2024-04-24 NOTE — BH INPATIENT PSYCHIATRY PROGRESS NOTE - NSBHMSETHTPROC_PSY_A_CORE
Perseverative/Illogical/Impaired reasoning
Linear
Linear/Impaired reasoning
Linear/Impaired reasoning
Perseverative/Illogical/Impaired reasoning
Linear/Impaired reasoning

## 2024-04-24 NOTE — BH DISCHARGE NOTE NURSING/SOCIAL WORK/PSYCH REHAB - DISCHARGE INSTRUCTIONS AFTERCARE APPOINTMENTS
In order to check the location, date, or time of your aftercare appointment, please refer to your Discharge Instructions Document given to you upon leaving the hospital.  If you have lost the instructions please call [phone]

## 2024-04-24 NOTE — BH DISCHARGE NOTE NURSING/SOCIAL WORK/PSYCH REHAB - NSDCPRGOAL_PSY_ALL_CORE
Initially, pt was on CO 1:1 for SIB. Pt made progress towards his discharge. Pt has maintained good behavioral control towards his discharge. Pt has identified his coping skills such as breathing technique, pacing back and forth to manage symptom. Pt currently denies SI, HI, AH and VH. Pt has demonstrated compliant with medication regimen and dosage during this hospitalization. Pt has verbally agreed to comply with medication post-discharge. Pt is unable to identified specific benefits of medication compliance and stated he is feeling good. Pt showed approximately 25 % of group attendance. During the group session, pt was unable to tolerate group structure, participated relevantly. Pt was visible on the unit, interacts well with others. Pt was observed constantly pacing back and forth on the unit. Pt showed fair ADLs. Pt has participate in his safety plan.

## 2024-04-24 NOTE — BH DISCHARGE NOTE NURSING/SOCIAL WORK/PSYCH REHAB - PATIENT PORTAL LINK FT
You can access the FollowMyHealth Patient Portal offered by [facility] by registering at the following website: [email or web address]. By joining [employer]’s FollowMyHealth portal, you will also be able to view your health information using other applications (apps) compatible with our system.

## 2024-04-24 NOTE — BH INPATIENT PSYCHIATRY PROGRESS NOTE - NSTXDCOTHRINTERMD_PSY_ALL_CORE
collaborate with [facility]

## 2024-04-24 NOTE — BH INPATIENT PSYCHIATRY PROGRESS NOTE - NSICDXBHPRIMARYDX_PSY_ALL_CORE
Bipolar I disorder with mood-congruent psychotic features   F31.9  

## 2024-04-24 NOTE — BH DISCHARGE NOTE NURSING/SOCIAL WORK/PSYCH REHAB - NSBHREFERPURPOSE1_PSY_ALL_CORE
25 yo F with syncopal event.  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  She accidentally stuck herself with a clean needle, then became dizzy and attempted to sit down.  In the process she collapsed and went unconscious for seconds.  Alert was called and pt. was brought to the ER.  Pt. currently has no complaints.  She is given history without issue, feels well.  Notes this never happened before.  When asked, she admits to pelvic cramping, currently having her period.  No abnormal vaginal bleeding/discharge.    ROS: negative for fever, cough, headache, chest pain, shortness of breath, nausea, vomiting, diarrhea, rash, paresthesia, and weakness--all other systems reviewed are negative.   PMH: thrombocytopenia; Meds: birth control; SH: Denies smoking/drinking/drug use Other...

## 2024-04-24 NOTE — BH INPATIENT PSYCHIATRY PROGRESS NOTE - NSBHCONSBHPROVDETAILS_PSY_A_CORE  FT
left message for Maria Del Rosario --FACT team 

## 2024-04-24 NOTE — BH INPATIENT PSYCHIATRY PROGRESS NOTE - NSBHMETABOLIC_PSY_ALL_CORE_FT
BMI: BMI (kg/m2): 27.6 (04-16-24 @ 19:27)  HbA1c: A1C with Estimated Average Glucose Result: 5.3 % (04-18-24 @ 10:00)    Glucose: POCT Blood Glucose.: 87 mg/dL (04-14-24 @ 00:08)    BP: --Vital Signs Last 24 Hrs  T(C): 37.1 (04-23-24 @ 08:17), Max: 37.1 (04-23-24 @ 08:17)  T(F): 98.7 (04-23-24 @ 08:17), Max: 98.7 (04-23-24 @ 08:17)  HR: --  BP: --  BP(mean): --  RR: --  SpO2: --    Orthostatic VS  04-23-24 @ 08:17  Lying BP: --/-- HR: --  Sitting BP: 131/76 HR: 81  Standing BP: 107/69 HR: 96  Site: --  Mode: --  Orthostatic VS  04-22-24 @ 19:27  Lying BP: --/-- HR: --  Sitting BP: 123/82 HR: 63  Standing BP: 123/78 HR: 72  Site: --  Mode: electronic  Orthostatic VS  04-22-24 @ 08:07  Lying BP: --/-- HR: --  Sitting BP: 115/76 HR: 60  Standing BP: 120/80 HR: 64  Site: upper left arm  Mode: electronic  Orthostatic VS  04-21-24 @ 19:19  Lying BP: --/-- HR: --  Sitting BP: 125/76 HR: 67  Standing BP: 122/77 HR: 65  Site: --  Mode: --    Lipid Panel: Date/Time: 04-18-24 @ 10:00  Cholesterol, Serum: 145  LDL Cholesterol Calculated: 100  HDL Cholesterol, Serum: 30  Total Cholesterol/HDL Ration Measurement: --  Triglycerides, Serum: 76

## 2024-04-24 NOTE — BH INPATIENT PSYCHIATRY PROGRESS NOTE - NSBHMSETHTCONTENT_PSY_A_CORE
Unremarkable
Preoccupations/Hopelessness/Suicidality
Unremarkable
Preoccupations/Hopelessness/Suicidality
Preoccupations/Hopelessness/Suicidality
Preoccupations/Hopelessness

## 2024-04-24 NOTE — BH INPATIENT PSYCHIATRY PROGRESS NOTE - PRN MEDS
MEDICATIONS  (PRN):  haloperidol     Tablet 5 milliGRAM(s) Oral every 6 hours PRN Agitation  haloperidol    Injectable 5 milliGRAM(s) IntraMuscular once PRN Severe Agitation  LORazepam     Tablet 2 milliGRAM(s) Oral every 4 hours PRN Anxiety/Agitation  LORazepam   Injectable 2 milliGRAM(s) IntraMuscular once PRN Severe Anxiety/ Severe Agitation  nicotine  Polacrilex Gum 4 milliGRAM(s) Oral every 2 hours PRN Smoking Cessation  traZODone 100 milliGRAM(s) Oral at bedtime PRN insomnia  
MEDICATIONS  (PRN):  haloperidol     Tablet 5 milliGRAM(s) Oral every 6 hours PRN Agitation  haloperidol    Injectable 5 milliGRAM(s) IntraMuscular once PRN Severe Agitation  LORazepam     Tablet 2 milliGRAM(s) Oral every 4 hours PRN Anxiety/Agitation  LORazepam   Injectable 2 milliGRAM(s) IntraMuscular once PRN Severe Anxiety/ Severe Agitation  nicotine  Polacrilex Gum 4 milliGRAM(s) Oral every 2 hours PRN Smoking Cessation  
MEDICATIONS  (PRN):  haloperidol     Tablet 5 milliGRAM(s) Oral every 6 hours PRN Agitation  haloperidol    Injectable 5 milliGRAM(s) IntraMuscular once PRN Severe Agitation  LORazepam     Tablet 2 milliGRAM(s) Oral every 4 hours PRN Anxiety/Agitation  LORazepam   Injectable 2 milliGRAM(s) IntraMuscular once PRN Severe Anxiety/ Severe Agitation  nicotine  Polacrilex Gum 4 milliGRAM(s) Oral every 2 hours PRN Smoking Cessation  
MEDICATIONS  (PRN):  benzocaine/menthol Lozenge 1 Lozenge Oral every 3 hours PRN sore throat  haloperidol     Tablet 5 milliGRAM(s) Oral every 6 hours PRN Agitation  haloperidol    Injectable 5 milliGRAM(s) IntraMuscular once PRN Severe Agitation  hydrOXYzine hydrochloride 50 milliGRAM(s) Oral every 6 hours PRN anxiety  LORazepam     Tablet 2 milliGRAM(s) Oral every 4 hours PRN Anxiety/Agitation  LORazepam   Injectable 2 milliGRAM(s) IntraMuscular once PRN Severe Anxiety/ Severe Agitation  nicotine  Polacrilex Gum 4 milliGRAM(s) Oral every 2 hours PRN Smoking Cessation  traZODone 100 milliGRAM(s) Oral at bedtime PRN insomnia  
MEDICATIONS  (PRN):  benzocaine/menthol Lozenge 1 Lozenge Oral every 3 hours PRN sore throat  hydrOXYzine hydrochloride 50 milliGRAM(s) Oral every 6 hours PRN anxiety  nicotine  Polacrilex Gum 4 milliGRAM(s) Oral every 2 hours PRN Smoking Cessation  QUEtiapine 50 milliGRAM(s) Oral every 6 hours PRN anxiety/agitation  traZODone 100 milliGRAM(s) Oral at bedtime PRN insomnia  
MEDICATIONS  (PRN):  haloperidol     Tablet 5 milliGRAM(s) Oral every 6 hours PRN Agitation  haloperidol    Injectable 5 milliGRAM(s) IntraMuscular once PRN Severe Agitation  LORazepam     Tablet 2 milliGRAM(s) Oral every 4 hours PRN Anxiety/Agitation  LORazepam   Injectable 2 milliGRAM(s) IntraMuscular once PRN Severe Anxiety/ Severe Agitation  nicotine  Polacrilex Gum 4 milliGRAM(s) Oral every 2 hours PRN Smoking Cessation  traZODone 100 milliGRAM(s) Oral at bedtime PRN insomnia  
MEDICATIONS  (PRN):  haloperidol     Tablet 5 milliGRAM(s) Oral every 6 hours PRN Agitation  haloperidol    Injectable 5 milliGRAM(s) IntraMuscular once PRN Severe Agitation  LORazepam     Tablet 2 milliGRAM(s) Oral every 4 hours PRN Anxiety/Agitation  LORazepam   Injectable 2 milliGRAM(s) IntraMuscular once PRN Severe Anxiety/ Severe Agitation  nicotine  Polacrilex Gum 4 milliGRAM(s) Oral every 2 hours PRN Smoking Cessation  traZODone 100 milliGRAM(s) Oral at bedtime PRN insomnia  
MEDICATIONS  (PRN):  acetaminophen     Tablet .. 650 milliGRAM(s) Oral every 6 hours PRN Temp greater or equal to 38C (100.4F), Moderate Pain (4 - 6)  benzocaine/menthol Lozenge 1 Lozenge Oral every 3 hours PRN sore throat  hydrOXYzine hydrochloride 50 milliGRAM(s) Oral every 6 hours PRN anxiety  nicotine  Polacrilex Gum 4 milliGRAM(s) Oral every 2 hours PRN Smoking Cessation  QUEtiapine 50 milliGRAM(s) Oral every 6 hours PRN anxiety/agitation  traZODone 100 milliGRAM(s) Oral at bedtime PRN insomnia

## 2024-04-24 NOTE — BH INPATIENT PSYCHIATRY PROGRESS NOTE - NSBHMSEPERCEPT_PSY_A_CORE
Auditory hallucinations
No abnormalities
Auditory hallucinations
No abnormalities
Auditory hallucinations
Auditory hallucinations

## 2024-04-24 NOTE — BH INPATIENT PSYCHIATRY PROGRESS NOTE - CURRENT MEDICATION
MEDICATIONS  (STANDING):  clonazePAM  Tablet 0.5 milliGRAM(s) Oral two times a day  risperiDONE  Disintegrating Tablet 2 milliGRAM(s) Oral at bedtime    MEDICATIONS  (PRN):  haloperidol     Tablet 5 milliGRAM(s) Oral every 6 hours PRN Agitation  haloperidol    Injectable 5 milliGRAM(s) IntraMuscular once PRN Severe Agitation  LORazepam     Tablet 2 milliGRAM(s) Oral every 4 hours PRN Anxiety/Agitation  LORazepam   Injectable 2 milliGRAM(s) IntraMuscular once PRN Severe Anxiety/ Severe Agitation  nicotine  Polacrilex Gum 4 milliGRAM(s) Oral every 2 hours PRN Smoking Cessation  traZODone 100 milliGRAM(s) Oral at bedtime PRN insomnia  
MEDICATIONS  (STANDING):  clonazePAM  Tablet 0.5 milliGRAM(s) Oral two times a day  risperiDONE  Disintegrating Tablet 1 milliGRAM(s) Oral at bedtime    MEDICATIONS  (PRN):  haloperidol     Tablet 5 milliGRAM(s) Oral every 6 hours PRN Agitation  haloperidol    Injectable 5 milliGRAM(s) IntraMuscular once PRN Severe Agitation  LORazepam     Tablet 2 milliGRAM(s) Oral every 4 hours PRN Anxiety/Agitation  LORazepam   Injectable 2 milliGRAM(s) IntraMuscular once PRN Severe Anxiety/ Severe Agitation  nicotine  Polacrilex Gum 4 milliGRAM(s) Oral every 2 hours PRN Smoking Cessation  
MEDICATIONS  (STANDING):  clonazePAM  Tablet 0.5 milliGRAM(s) Oral two times a day  multivitamin 1 Tablet(s) Oral daily  pantoprazole    Tablet 40 milliGRAM(s) Oral before breakfast  risperiDONE  Disintegrating Tablet 2 milliGRAM(s) Oral at bedtime    MEDICATIONS  (PRN):  benzocaine/menthol Lozenge 1 Lozenge Oral every 3 hours PRN sore throat  haloperidol     Tablet 5 milliGRAM(s) Oral every 6 hours PRN Agitation  haloperidol    Injectable 5 milliGRAM(s) IntraMuscular once PRN Severe Agitation  hydrOXYzine hydrochloride 50 milliGRAM(s) Oral every 6 hours PRN anxiety  LORazepam     Tablet 2 milliGRAM(s) Oral every 4 hours PRN Anxiety/Agitation  LORazepam   Injectable 2 milliGRAM(s) IntraMuscular once PRN Severe Anxiety/ Severe Agitation  nicotine  Polacrilex Gum 4 milliGRAM(s) Oral every 2 hours PRN Smoking Cessation  traZODone 100 milliGRAM(s) Oral at bedtime PRN insomnia  
MEDICATIONS  (STANDING):  clonazePAM  Tablet 0.5 milliGRAM(s) Oral two times a day  risperiDONE  Disintegrating Tablet 1 milliGRAM(s) Oral at bedtime    MEDICATIONS  (PRN):  haloperidol     Tablet 5 milliGRAM(s) Oral every 6 hours PRN Agitation  haloperidol    Injectable 5 milliGRAM(s) IntraMuscular once PRN Severe Agitation  LORazepam     Tablet 2 milliGRAM(s) Oral every 4 hours PRN Anxiety/Agitation  LORazepam   Injectable 2 milliGRAM(s) IntraMuscular once PRN Severe Anxiety/ Severe Agitation  nicotine  Polacrilex Gum 4 milliGRAM(s) Oral every 2 hours PRN Smoking Cessation  traZODone 100 milliGRAM(s) Oral at bedtime PRN insomnia  
MEDICATIONS  (STANDING):  clonazePAM  Tablet 0.5 milliGRAM(s) Oral two times a day  multivitamin 1 Tablet(s) Oral daily  pantoprazole    Tablet 40 milliGRAM(s) Oral before breakfast  risperiDONE  Disintegrating Tablet 2 milliGRAM(s) Oral at bedtime    MEDICATIONS  (PRN):  benzocaine/menthol Lozenge 1 Lozenge Oral every 3 hours PRN sore throat  hydrOXYzine hydrochloride 50 milliGRAM(s) Oral every 6 hours PRN anxiety  nicotine  Polacrilex Gum 4 milliGRAM(s) Oral every 2 hours PRN Smoking Cessation  QUEtiapine 50 milliGRAM(s) Oral every 6 hours PRN anxiety/agitation  traZODone 100 milliGRAM(s) Oral at bedtime PRN insomnia  
MEDICATIONS  (STANDING):  clonazePAM  Tablet 0.5 milliGRAM(s) Oral two times a day  risperiDONE  Disintegrating Tablet 2 milliGRAM(s) Oral at bedtime    MEDICATIONS  (PRN):  haloperidol     Tablet 5 milliGRAM(s) Oral every 6 hours PRN Agitation  haloperidol    Injectable 5 milliGRAM(s) IntraMuscular once PRN Severe Agitation  LORazepam     Tablet 2 milliGRAM(s) Oral every 4 hours PRN Anxiety/Agitation  LORazepam   Injectable 2 milliGRAM(s) IntraMuscular once PRN Severe Anxiety/ Severe Agitation  nicotine  Polacrilex Gum 4 milliGRAM(s) Oral every 2 hours PRN Smoking Cessation  traZODone 100 milliGRAM(s) Oral at bedtime PRN insomnia  
MEDICATIONS  (STANDING):  clonazePAM  Tablet 0.5 milliGRAM(s) Oral two times a day  risperiDONE  Disintegrating Tablet 1 milliGRAM(s) Oral at bedtime    MEDICATIONS  (PRN):  haloperidol     Tablet 5 milliGRAM(s) Oral every 6 hours PRN Agitation  haloperidol    Injectable 5 milliGRAM(s) IntraMuscular once PRN Severe Agitation  LORazepam     Tablet 2 milliGRAM(s) Oral every 4 hours PRN Anxiety/Agitation  LORazepam   Injectable 2 milliGRAM(s) IntraMuscular once PRN Severe Anxiety/ Severe Agitation  nicotine  Polacrilex Gum 4 milliGRAM(s) Oral every 2 hours PRN Smoking Cessation  
MEDICATIONS  (STANDING):  clonazePAM  Tablet 0.5 milliGRAM(s) Oral two times a day  multivitamin 1 Tablet(s) Oral daily  pantoprazole    Tablet 40 milliGRAM(s) Oral before breakfast  risperiDONE  Disintegrating Tablet 2 milliGRAM(s) Oral at bedtime    MEDICATIONS  (PRN):  acetaminophen     Tablet .. 650 milliGRAM(s) Oral every 6 hours PRN Temp greater or equal to 38C (100.4F), Moderate Pain (4 - 6)  benzocaine/menthol Lozenge 1 Lozenge Oral every 3 hours PRN sore throat  hydrOXYzine hydrochloride 50 milliGRAM(s) Oral every 6 hours PRN anxiety  nicotine  Polacrilex Gum 4 milliGRAM(s) Oral every 2 hours PRN Smoking Cessation  QUEtiapine 50 milliGRAM(s) Oral every 6 hours PRN anxiety/agitation  traZODone 100 milliGRAM(s) Oral at bedtime PRN insomnia

## 2024-04-24 NOTE — BH INPATIENT PSYCHIATRY PROGRESS NOTE - NSTXSUICIDINTERMD_PSY_ALL_CORE
psychopharmacology

## 2024-04-25 VITALS — TEMPERATURE: 97 F | RESPIRATION RATE: 18 BRPM

## 2024-04-25 PROCEDURE — 99238 HOSP IP/OBS DSCHRG MGMT 30/<: CPT

## 2024-04-25 RX ADMIN — Medication 650 MILLIGRAM(S): at 10:20

## 2024-04-25 RX ADMIN — QUETIAPINE FUMARATE 50 MILLIGRAM(S): 200 TABLET, FILM COATED ORAL at 07:45

## 2024-04-25 RX ADMIN — Medication 1 TABLET(S): at 07:47

## 2024-04-25 RX ADMIN — PANTOPRAZOLE SODIUM 40 MILLIGRAM(S): 20 TABLET, DELAYED RELEASE ORAL at 07:45

## 2024-04-25 NOTE — BH INPATIENT PSYCHIATRY DISCHARGE NOTE - HPI (INCLUDE ILLNESS QUALITY, SEVERITY, DURATION, TIMING, CONTEXT, MODIFYING FACTORS, ASSOCIATED SIGNS AND SYMPTOMS)
49 y/o with with hx of bipolar disorder  and prior suicide attempts, recently discharged from inpatient unit at [facility],  who presents after suicide attempt, patient overdosed on lexapro and lithium on 4/14. Patient followed by medicine, [facility] psychiat[facility], lithium level never in toxic range, was medically cleared and 2PC'd, transferred to [facility]. On arrival to the unit, patient reported to nursing that he feels hopeless, and "I want to kill myself," reporting that he would cut himself if he could. He reports feeling very depressed and anxious.   Upon admission to [facility] patient  reported he has h/o bipolar disorder, poor historian in terms of his medications, reports " I took the whole bottle of one of my medication to kill myself" does not know which medication. Stated that he has been hearing voices of demons telling him to kill himself since he got discharged from [facility] last week (admitted for SA). Reports ongoing SI but no intention or plan, Denies HIIP, denies voices are telling him to harm others. Denies feeling unsafe. Reports depressed mood and hopelessness. Reports he follow up with FACT team but does not have their number. Reports he was  from his wife 18 months ago, has been sober form alcohol and cocaine since last 18 months.

## 2024-04-25 NOTE — BH INPATIENT PSYCHIATRY DISCHARGE NOTE - ATTENDING DISCHARGE PHYSICAL EXAMINATION:
I have personally seen and evaluated patient prior to discharge. Chart reviewed and discharge plan discussed with treatment team prior to d/c. Pt prior to discharge was calm, cooperative, friendly and smiling. Patient has been attending groups with active participation.  Pt interacting well with others. No recent behavioral problems and no episodes of aggressive or dangerous behavior. No problems with sleep, appetite or energy level. Denied any active and current manic, hypomanic, depressive, psychotic or anxiety symptoms. Denied any current SI/HI/AH/VH/PI. No overt delusions.  Patient is organized and commits to safety and to ongoing outpatient treatment.   Pt asks relevant questions about his discharge plan and about the medication regimen. Pt articulate a plan for living life after discharge. Medication risks/benefits/side effects were discussed with the patient.  Patient expressed understanding and agreed with the treatment plan. Further, instructed the patient to seek help immediately by dialing "911" or by going to the nearest ER if symptoms worsen.   Chronic risk factors include hx of bipolar d/o, prior hx of SA and multiple psych admission, hx of chronic impulsivity  Protective factors include having children, in a relationship, previously employed, support in community from FACT team and family network, compliant with psychiatric medications, motivated to engage in outpateint psychiatric treatment, future oriented thinking, expresses concern for well being, prior high level of functioning before psychiatric decompensation, much aspirations for return to career, find long term stable housing and wish to pursue pleasurable activities.  As such, its chronic risk factors are mitigated by their protective factors. Pt does not pose an acute elevated risk of imminent danger to harm to self or others. Does not require further inpatient psychiatric admisison at this time. Psychiatrically cleared for discharge.   Pt urged to avoid using any alcohol or street drugs, particularly marijuana & K2, cocaine and methamphetamine (crystal meth) once discharged.  Safety plan filled out by patient and submitted into chart.

## 2024-04-25 NOTE — BH INPATIENT PSYCHIATRY DISCHARGE NOTE - NSDCMRMEDTOKEN_GEN_ALL_CORE_FT
Protonix 40 mg oral delayed release tablet: 1 tab(s) orally once a day (before a meal)  RisperDAL 2 mg oral tablet: 1 tab(s) orally once a day (at bedtime)  traZODone 100 mg oral tablet: 1 tab(s) orally once a day (at bedtime) as needed for insomnia

## 2024-04-25 NOTE — BH INPATIENT PSYCHIATRY DISCHARGE NOTE - NSBHASSESSSUMMFT_PSY_ALL_CORE
49 y/o with with hx of bipolar disorder  and prior suicide attempts, recently discharged from inpatient unit at [facility],  who presents after suicide attempt, patient overdosed on lexapro and lithium on 4/14. Patient followed by medicine, [facility] psychiat[facility], lithium level never in toxic range, was medically cleared and 2PC'd, transferred to [facility]. Initially placed on 1:1 CO for CAH to harm self and recent . After a few days, 1:1 CO discontinued. Pt mood was much brighter, denied SI/SP since admission up until discharge.

## 2024-04-25 NOTE — BH INPATIENT PSYCHIATRY DISCHARGE NOTE - OTHER PAST PSYCHIATRIC HISTORY (INCLUDE DETAILS REGARDING ONSET, COURSE OF ILLNESS, INPATIENT/OUTPATIENT TREATMENT)
Pt is a 48 year old male, unemployed, undomiciled, and  from his wife and children. per clinicals pt was discharged from [facility] last week and was sent to [facility] in [place], followed by [facility] ACT team, and was placed on AOT. per clinicals pt has hx of bipolar disorder. per clinicals pt has hx of multiple admissions to rehabs and detox's. [facility] clinicals pt has hx of multiple psychiatric admissions most recently last week at [facility] and last at [facility] in 2022. per clinicals pt presents post suicide attempt via OD on lexapro and lithium on 4/14/24. per clinicals pt was transferred to [facility] for SI with intent.    [facility] met with pt to discuss admission and to formulate tx plan. pt presents with depression and with SI. pt reports if he had the opportunity to harm himself while on the unit he would. pt is on 1:1 CO for SI. pt reports he has been suicidal for months. pt reports he was discharged from [facility] last week and sent to [facility] which he did not like. pt endorses AH and reports he has heard voices consistently for years and they never go away. pt did not wish to give details. pt denies HI and VH. pt reports hx of SA via OD with pills and another attempt via cutting wrist. pt reports he last took pills on Saturday and he last cut himself a couple of months ago. pt reports he is  from his wife (who has their kids) and has been living at hospitals the last few months. Pt reports he did not  his medications after being discharged from [facility] last week and he has not taken any medications since. pt provided consent for tx team to speak with his sister, his FACT team, and the SW at [facility] to obtain information regarding his discharge plans from there. pt reports he was last employed January 9th and is currently on unemployment. pt reports he has had over 12 inpatient hospitalizations, first one being at age 20 for self harm. pt endorses hx of substance abuse, last used Cocaine and Alcohol 18 months ago. pt denies current legal issues.

## 2024-04-25 NOTE — BH INPATIENT PSYCHIATRY DISCHARGE NOTE - HOSPITAL COURSE
Pt during course initially started on Risperdal, titrated to 2 mg qhs for CAH to harm self. Pt placed on 1:1 CO for CAH to harm self, however this was discontnued after monitoring patient over the weekend without any further CAH or thoughts to harm self. Pt placed on trazodone 100 mg qhs PRN for insomnia.   Pt was medication adherent on this regimen, tolerating it well without reported se. Pt reported improved mood, brighter and more future oriented      I have personally seen and evaluated patient prior to discharge. Chart reviewed and discharge plan discussed with treatment team prior to d/c. Pt prior to discharge was calm, cooperative, friendly and smiling. Patient has been attending groups with active participation.  Pt interacting well with others. No recent behavioral problems and no episodes of aggressive or dangerous behavior. No problems with sleep, appetite or energy level. Denied any active and current manic, hypomanic, depressive, psychotic or anxiety symptoms. Denied any current SI/HI/AH/VH/PI. No overt delusions.  Patient is organized and commits to safety and to ongoing outpatient treatment.   Pt asks relevant questions about his discharge plan and about the medication regimen. Pt articulate a plan for living life after discharge. Medication risks/benefits/side effects were discussed with the patient.  Patient expressed understanding and agreed with the treatment plan. Further, instructed the patient to seek help immediately by dialing "911" or by going to the nearest ER if symptoms worsen.   Chronic risk factors include hx of bipolar d/o, prior hx of SA and multiple psych admission, hx of chronic impulsivity  Protective factors include having children, in a relationship, previously employed, support in community from FACT team and family network, compliant with psychiatric medications, motivated to engage in outpateint psychiatric treatment, future oriented thinking, expresses concern for well being, prior high level of functioning before psychiatric decompensation, much aspirations for return to career, find long term stable housing and wish to pursue pleasurable activities.  As such, its chronic risk factors are mitigated by their protective factors. Pt does not pose an acute elevated risk of imminent danger to harm to self or others. Does not require further inpatient psychiatric admisison at this time. Psychiatrically cleared for discharge.   Pt urged to avoid using any alcohol or street drugs, particularly marijuana & K2, cocaine and methamphetamine (crystal meth) once discharged.  Safety plan filled out by patient and submitted into chart.

## 2024-06-26 ENCOUNTER — EMERGENCY (EMERGENCY)
Facility: HOSPITAL | Age: 49
LOS: 1 days | Discharge: HOME | End: 2024-06-26
Attending: EMERGENCY MEDICINE
Payer: MEDICAID

## 2024-06-26 VITALS
OXYGEN SATURATION: 98 % | SYSTOLIC BLOOD PRESSURE: 100 MMHG | WEIGHT: 205.69 LBS | HEART RATE: 59 BPM | TEMPERATURE: 98 F | RESPIRATION RATE: 18 BRPM | DIASTOLIC BLOOD PRESSURE: 66 MMHG

## 2024-06-26 PROCEDURE — 99283 EMERGENCY DEPT VISIT LOW MDM: CPT | Mod: 25

## 2024-06-26 PROCEDURE — 93005 ELECTROCARDIOGRAM TRACING: CPT

## 2024-06-26 PROCEDURE — 99284 EMERGENCY DEPT VISIT MOD MDM: CPT

## 2024-06-26 PROCEDURE — 93010 ELECTROCARDIOGRAM REPORT: CPT

## 2024-06-26 RX ORDER — BUSPIRONE HYDROCHLORIDE 10 MG/1
10 TABLET ORAL ONCE
Refills: 0 | Status: COMPLETED | OUTPATIENT
Start: 2024-06-26 | End: 2024-06-26

## 2024-06-26 RX ADMIN — BUSPIRONE HYDROCHLORIDE 10 MILLIGRAM(S): 10 TABLET ORAL at 17:47

## 2024-08-06 ENCOUNTER — EMERGENCY (EMERGENCY)
Facility: HOSPITAL | Age: 49
LOS: 1 days | Discharge: HOME | End: 2024-08-06
Attending: EMERGENCY MEDICINE
Payer: MEDICAID

## 2024-08-06 VITALS
HEART RATE: 67 BPM | TEMPERATURE: 98 F | RESPIRATION RATE: 18 BRPM | SYSTOLIC BLOOD PRESSURE: 120 MMHG | OXYGEN SATURATION: 97 % | DIASTOLIC BLOOD PRESSURE: 79 MMHG

## 2024-08-06 VITALS
RESPIRATION RATE: 20 BRPM | TEMPERATURE: 98 F | SYSTOLIC BLOOD PRESSURE: 119 MMHG | OXYGEN SATURATION: 97 % | HEART RATE: 63 BPM | DIASTOLIC BLOOD PRESSURE: 80 MMHG | HEIGHT: 71 IN | WEIGHT: 203.93 LBS

## 2024-08-06 DIAGNOSIS — F31.9 BIPOLAR DISORDER, UNSPECIFIED: ICD-10-CM

## 2024-08-06 PROCEDURE — 99285 EMERGENCY DEPT VISIT HI MDM: CPT

## 2024-08-06 PROCEDURE — 90792 PSYCH DIAG EVAL W/MED SRVCS: CPT

## 2024-08-06 PROCEDURE — 99284 EMERGENCY DEPT VISIT MOD MDM: CPT

## 2024-08-06 NOTE — ED PROVIDER NOTE - CLINICAL SUMMARY MEDICAL DECISION MAKING FREE TEXT BOX
Laboratory studies obtained including EKG, patient transferred to behavioral [facility] for evaluation.

## 2024-08-06 NOTE — ED ADULT NURSE NOTE - NSICDXPASTMEDICALHX_GEN_ALL_CORE_FT
PAST MEDICAL HISTORY:  Hypertension     Obesity      PAST MEDICAL HISTORY:  H/O: depression     History of anxiety     Hypertension     Obesity

## 2024-08-06 NOTE — ED PROVIDER NOTE - PATIENT PORTAL LINK FT
You can access the FollowMyHealth Patient Portal offered by [facility] by registering at the following website: [email or web address]. By joining [facility]’s FollowMyHealth portal, you will also be able to view your health information using other applications (apps) compatible with our system.

## 2024-08-06 NOTE — ED ADULT TRIAGE NOTE - CHIEF COMPLAINT QUOTE
Pt [facility] with SI and depression. Placed in yellow gown, belongings removed.

## 2024-08-06 NOTE — ED ADULT NURSE REASSESSMENT NOTE - NS ED NURSE REASSESS COMMENT FT1
Patient was discharged with taxi transportation. Left safely. Discharge plan reviewed with patient, no complain.

## 2024-08-06 NOTE — ED BEHAVIORAL HEALTH NOTE - BEHAVIORAL HEALTH NOTE
SWNote: pt seen by behavioral provider  (Valentin) pt T&R , pt from [facility] guidance [address] pt accepted back,mcaid taxi to be arranged.   [facility] called (Horacio) taxi requested, reserv#[phone] Taxi [place] ETA 19:45 ,pt will be p/u at the ED lobby. Pt has behavioral services in the community already . No other SW needs reported at this moment.

## 2024-08-06 NOTE — ED BEHAVIORAL HEALTH ASSESSMENT NOTE - HPI (INCLUDE ILLNESS QUALITY, SEVERITY, DURATION, TIMING, CONTEXT, MODIFYING FACTORS, ASSOCIATED SIGNS AND SYMPTOMS)
Patient is a 48 year old, male; domiciled with fellow residents at [facility];  twice; 3 children; ages 17, 17 yo from first wife and 6 yo from second wife; noncaregiver to all 3 children; unemployed on SSI; past psychiatric history of bipolar d/o vs schizoaffective d/o; numerous psychiatric hospitalizations, especially since Feb of this year [facility] in Feb, [facility] in March, [facility] in April, [facility] in May, [facility] in July; reported previous suicide attempt by overdose; no known history of violence or arrests; history of alcohol and cocaine abuse; no known history of complicated withdrawal; PMH of HTN; brought in by EMS; called by staff at residence; in the setting of pt endorsing SI.     Upon assessment, pt states "I did it all for attention." Pt denies any thoughts of wanting to harm himself or anyone else and would like to return to his residence, [place] [address]. Pt endorses a hx of Bipolar d/o and hx of etOH and cocaine abuse but states he has been sober for several months now. Pt is future oriented, speaks with linear, logical thought process with no acute signs of psychosis or mood disturbance elicited upon exam. Thought process appears to be somewhat concrete however no history of intellectual disability per collateral.     Collateral from pt's sister states that the pt has a hx of endorsing SI without intent and feels there is no reason he can not be discharged.     Collateral from ex-wife endorses the same and expresses no safety concerns.     Collateral from staff at [place] [address] express no concerns with the pt returning to their residence.

## 2024-08-06 NOTE — ED ADULT NURSE NOTE - HPI (INCLUDE ILLNESS QUALITY, SEVERITY, DURATION, TIMING, CONTEXT, MODIFYING FACTORS, ASSOCIATED SIGNS AND SYMPTOMS)
pt states he was discharged by [facility] yesterday and complained to his residence that he is suicidal for "attention". pt endorses being discharged at this time. pt denies auditory or visual hallucinations.

## 2024-08-06 NOTE — ED PROVIDER NOTE - PHYSICAL EXAMINATION
Vitals: WNL  Gen: Anxious and pacing  Head: NCAT    ENT: EOMI. No exudates  CV: RRR. Audible S1 and S2. No murmurs. 2+ radial and DP pulses   Pulm: Clear to auscultation bilaterally. No wheezes, rales, or rhonchi  Abd: soft, NTND, no rebound, no guarding  Musculoskeletal:  No peripheral edema, no calf ttp  Neurologic: AAOx3, no focal deficits  : no CVA tenderness

## 2024-08-06 NOTE — ED BEHAVIORAL HEALTH ASSESSMENT NOTE - DESCRIPTION
No acute events.     ICU Vital Signs Last 24 Hrs  T(C): 36.6 (06 Aug 2024 16:35), Max: 36.7 (06 Aug 2024 14:38)  T(F): 97.9 (06 Aug 2024 16:35), Max: 98.1 (06 Aug 2024 14:38)  HR: 67 (06 Aug 2024 16:35) (63 - 67)  BP: 120/79 (06 Aug 2024 16:35) (119/80 - 120/79)  BP(mean): --  ABP: --  ABP(mean): --  RR: 18 (06 Aug 2024 16:35) (18 - 20)  SpO2: 97% (06 Aug 2024 16:35) (97% - 97%)    O2 Parameters below as of 06 Aug 2024 16:35  Patient On (Oxygen Delivery Method): room air  twice; 3 children; 2 from first marriage; 6 yo from second marriage;  now 3 months from second marriage; unemployed HTN

## 2024-08-06 NOTE — ED BEHAVIORAL HEALTH ASSESSMENT NOTE - DETAILS
911 activated by pt provider unknown pt now denies any SI stating he "did all this for attention." Discharged from [facility] yesterday discussed with pt

## 2024-08-06 NOTE — ED ADULT NURSE NOTE - PATIENT'S PREFERRED PRONOUN
Him/He Glycopyrrolate Counseling:  I discussed with the patient the risks of glycopyrrolate including but not limited to skin rash, drowsiness, dry mouth, difficulty urinating, and blurred vision.

## 2024-08-06 NOTE — ED BEHAVIORAL HEALTH ASSESSMENT NOTE - SUMMARY
48 year old, male; domiciled with fellow residents at [address];  twice; 3 children; ages 17, 17 yo from first wife and 4 yo from second wife; noncaregiver to all 3 children; unemployed on SSI; past psychiatric history of bipolar d/o vs schizoaffective d/o; numerous psychiatric hospitalizations, especially since Feb of this year [facility] in Feb, [place] in March, [facility] in April, [facility] in May, [facility] in July; reported previous suicide attempt by overdose; no known history of violence or arrests; history of alcohol and cocaine abuse; no known history of complicated withdrawal; PMH of HTN; brought in by EMS; called by staff at residence; in the setting of pt endorsing SI.     Upon assessment, pt presents calm, cooperative stating he "only said that for attention. Pt denies any acute symptoms inc SI/HI/AH/VH/paranoia; no signs of acute mood disturbance upon arrival. Collateral from ex wife, sister and staff from residence all deny any acute safety concerns with the pt being discharged. Pt at this time does not warrant further observation and is psychiatrically cleared. Case discussed with Dr. Dodd.

## 2024-08-06 NOTE — ED PROVIDER NOTE - OBJECTIVE STATEMENT
48M history anxiety and bipolar disorder presenting for worsening anxiety and thoughts of self-harm.  States he is thinking about cutting himself with a razor blade however has controlled himself not do so.  He has done this before.  Denies any drug use or substance abuse, hallucinations, use of alcohol or homicidal ideation.  Recently discharged from [facility] however states no improvement.

## 2024-08-09 DIAGNOSIS — R45.851 SUICIDAL IDEATIONS: ICD-10-CM

## 2024-08-09 DIAGNOSIS — F31.9 BIPOLAR DISORDER, UNSPECIFIED: ICD-10-CM

## 2024-08-09 DIAGNOSIS — F41.9 ANXIETY DISORDER, UNSPECIFIED: ICD-10-CM

## 2024-08-09 DIAGNOSIS — I10 ESSENTIAL (PRIMARY) HYPERTENSION: ICD-10-CM

## 2025-02-17 NOTE — ED PROVIDER NOTE - OBJECTIVE STATEMENT
Pt notified PA denied via [facility].    Gabrielle Daniels RN on 2/17/2025 at 9:17 AM     This patient is a 47 year old man hx of bipolar disorder and depression who presents to the ER c/o feeling depressed, experiencing auditory and visual hallucinations and having suicidal thoughts.  Patient states he wants to kill himself his plan is to cut his wrist.  Patient was recently discharged from [facility] as a psych admission. This patient is a 47 year old man hx of bipolar disorder and depression who presents to the ER c/o feeling depressed, experiencing auditory and visual hallucinations and having suicidal thoughts.  Patient states he wants to kill himself his plan is to cut his wrist.  Patient was recently discharged from [facility] as a psych admission.  He denies alcohol and drug use.

## 2025-05-26 NOTE — BH PSYCHOLOGY - CLINICIAN PSYCHOTHERAPY NOTE - NSBHPSYCHOLNARRATIVE_PSY_A_CORE FT
DESCRIPTION: This is a 46 year old male with 30 year history of alcohol and cocaine use , who is  but currently domiciled at sober living house. He was brought in following increased suicidal ideation, superficially cutting wrists, and experiencing hallucinations.   SESSION CONTENT: Mr Zarate and this clinician discussed his stay on unit so far, with him endorsing positive outlook towards tx team so far and hope for collaborative improvement. Mr Zarate and this clinician then discussed precipitants to his admission where he reported needing to "finally get myself together and take care of my mental health". He reported on increased suicidality and cutting his wrist without intention to die as well as endorsing hallucination of a man who is his "better half." However, he also stated that this man tells him to kill himself because of all the harm he has caused to other people like his wife and then by cutting himself Mr Zarate feels some relief from this voice momentarily. Mr Zarate did endorse superficially cutting himself on unit with pieces of cardboard and a comb but denied that he felt at risk of harming himself more seriously on the unit. He stated that this is the first time he has experienced a hallucination of this type and denied ever having similar a/vh. Mr Zarate and this clinician discussed amount of guilt that he carries with him about harm he has caused to others but he also stated that it is difficult for him to recall specific things that he has done because he was "blacked out" for so much of it. He also reported having some trouble with longer term memory, having trouble recollecting all the places he lived during his childhood and feeling that he only has limited memories he can access from childhood.   BEHAVIORAL OBSERVATIONS: Mr Zarate was well oriented and engaged throughout session. He endorsed hallucinations though reported that they were not present at the moment and he did not appear internally preoccupied. His leg was shaking for majority of session and he maintained limited eye contact, however was largely organized and coherent in his speech.   RISK FACTORS:                          Static - Hx of substance use.                           Modifiable - Feeling of guilt, command hallucinations                          Protective - supportive family  APPEARANCE:  [] adequately groomed [x] disheveled [] malodorous [] Other:   BEHAVIOR: [x] cooperative [] uncooperative [x] good EC [] poor EC [x] well related [] oddly related [] guarded []PMA [] PMR []abnormal movements [] Other:   SPEED: [x] normal rate/rhythm/volume [] loud [] quiet [] slow [] rapid [] pressured [] Other: _________   MOOD: [] euthymic [x] dysphoric [] anxious [] irritable [] Other: ___________   AFFECT: [] full [] expansive [] constricted [] blunted [] flat [x] stable [] labile [] Other: ________________ THOUGHT PROCESS: [x] organized [] disorganized [] goal-directed [] concrete [] logical [] illogical   [] circumstantial [] tangential [] impoverished [] effusive [] repetitive [] Other:  THOUGHT CONTENT: [x] negative for delusions/suicidal ideation /homicidal ideation [] positive for delusions/suicidal ideation/homicidal ideation Describe:   PERCEPTION: [] negative for auditory/ visual hallucinations [x] positive for auditory/ visual hallucinations Describe: ________________________________________________________   INSIGHT/JUDGMENT: [] good [x]fair [] poor        IMPULSE CONTROL: [] good [x]fair [] poor     COGNITION: [x] alert and oriented to person, time, place [] Lacks orientation to person/time/place. Describe: ___________________________   Spoke to pt and scheduled his lab appt for tomorrow.